# Patient Record
Sex: MALE | Race: BLACK OR AFRICAN AMERICAN | NOT HISPANIC OR LATINO | URBAN - METROPOLITAN AREA
[De-identification: names, ages, dates, MRNs, and addresses within clinical notes are randomized per-mention and may not be internally consistent; named-entity substitution may affect disease eponyms.]

---

## 2024-05-08 ENCOUNTER — HOSPITAL ENCOUNTER (INPATIENT)
Facility: HOSPITAL | Age: 64
LOS: 21 days | Discharge: NURSING, REHAB OR LONG-TERM CARE | DRG: 180 | End: 2024-05-29
Attending: STUDENT IN AN ORGANIZED HEALTH CARE EDUCATION/TRAINING PROGRAM | Admitting: HOSPITALIST
Payer: MEDICAID

## 2024-05-08 DIAGNOSIS — B20 AIDS (ACQUIRED IMMUNODEFICIENCY SYNDROME), CD4 <=200: ICD-10-CM

## 2024-05-08 DIAGNOSIS — R00.0 TACHYCARDIA: ICD-10-CM

## 2024-05-08 DIAGNOSIS — J90 PLEURAL EFFUSION, LEFT: ICD-10-CM

## 2024-05-08 DIAGNOSIS — J90 PLEURAL EFFUSION: ICD-10-CM

## 2024-05-08 DIAGNOSIS — C34.92 ADENOCARCINOMA OF LEFT LUNG: ICD-10-CM

## 2024-05-08 DIAGNOSIS — F14.90 CRACK COCAINE USE: ICD-10-CM

## 2024-05-08 DIAGNOSIS — R16.0 HYPODENSE MASS OF LIVER: ICD-10-CM

## 2024-05-08 DIAGNOSIS — J91.0 MALIGNANT PLEURAL EFFUSION: ICD-10-CM

## 2024-05-08 DIAGNOSIS — C34.90 ADENOCARCINOMA, LUNG: ICD-10-CM

## 2024-05-08 DIAGNOSIS — Z72.0 TOBACCO USE: ICD-10-CM

## 2024-05-08 DIAGNOSIS — R06.02 SHORTNESS OF BREATH: ICD-10-CM

## 2024-05-08 DIAGNOSIS — R09.02 HYPOXIA: Primary | ICD-10-CM

## 2024-05-08 DIAGNOSIS — C34.90 ADENOCARCINOMA OF LUNG, UNSPECIFIED LATERALITY: ICD-10-CM

## 2024-05-08 DIAGNOSIS — R06.02 SOB (SHORTNESS OF BREATH): ICD-10-CM

## 2024-05-08 DIAGNOSIS — R07.9 CHEST PAIN: ICD-10-CM

## 2024-05-08 PROBLEM — F99 PSYCHIATRIC DISORDER: Status: ACTIVE | Noted: 2020-06-03

## 2024-05-08 PROBLEM — M48.00 CENTRAL SPINAL STENOSIS: Status: ACTIVE | Noted: 2019-03-18

## 2024-05-08 PROBLEM — M47.812 CERVICAL SPONDYLOSIS: Status: ACTIVE | Noted: 2019-01-22

## 2024-05-08 PROBLEM — J96.01 ACUTE RESPIRATORY FAILURE WITH HYPOXIA: Status: ACTIVE | Noted: 2024-05-08

## 2024-05-08 LAB
ALBUMIN SERPL BCP-MCNC: 2.9 G/DL (ref 3.5–5.2)
ALP SERPL-CCNC: 98 U/L (ref 55–135)
ALT SERPL W/O P-5'-P-CCNC: 26 U/L (ref 10–44)
ANION GAP SERPL CALC-SCNC: 8 MMOL/L (ref 8–16)
AST SERPL-CCNC: 27 U/L (ref 10–40)
BASOPHILS # BLD AUTO: 0.06 K/UL (ref 0–0.2)
BASOPHILS NFR BLD: 0.6 % (ref 0–1.9)
BILIRUB SERPL-MCNC: 0.2 MG/DL (ref 0.1–1)
BNP SERPL-MCNC: <10 PG/ML (ref 0–99)
BUN SERPL-MCNC: 10 MG/DL (ref 8–23)
CALCIUM SERPL-MCNC: 8.7 MG/DL (ref 8.7–10.5)
CHLORIDE SERPL-SCNC: 109 MMOL/L (ref 95–110)
CO2 SERPL-SCNC: 23 MMOL/L (ref 23–29)
CREAT SERPL-MCNC: 0.8 MG/DL (ref 0.5–1.4)
DIFFERENTIAL METHOD BLD: ABNORMAL
EOSINOPHIL # BLD AUTO: 0.3 K/UL (ref 0–0.5)
EOSINOPHIL NFR BLD: 3.2 % (ref 0–8)
ERYTHROCYTE [DISTWIDTH] IN BLOOD BY AUTOMATED COUNT: 14.2 % (ref 11.5–14.5)
EST. GFR  (NO RACE VARIABLE): >60 ML/MIN/1.73 M^2
GLUCOSE SERPL-MCNC: 90 MG/DL (ref 70–110)
HCT VFR BLD AUTO: 50.2 % (ref 40–54)
HCV AB SERPL QL IA: NORMAL
HGB BLD-MCNC: 15.9 G/DL (ref 14–18)
HIV 1+2 AB+HIV1 P24 AG SERPL QL IA: REACTIVE
IMM GRANULOCYTES # BLD AUTO: 0.05 K/UL (ref 0–0.04)
IMM GRANULOCYTES NFR BLD AUTO: 0.5 % (ref 0–0.5)
LYMPHOCYTES # BLD AUTO: 1.9 K/UL (ref 1–4.8)
LYMPHOCYTES NFR BLD: 19.6 % (ref 18–48)
MCH RBC QN AUTO: 28.4 PG (ref 27–31)
MCHC RBC AUTO-ENTMCNC: 31.7 G/DL (ref 32–36)
MCV RBC AUTO: 90 FL (ref 82–98)
MONOCYTES # BLD AUTO: 1.1 K/UL (ref 0.3–1)
MONOCYTES NFR BLD: 11 % (ref 4–15)
NEUTROPHILS # BLD AUTO: 6.3 K/UL (ref 1.8–7.7)
NEUTROPHILS NFR BLD: 65.1 % (ref 38–73)
NRBC BLD-RTO: 0 /100 WBC
PLATELET # BLD AUTO: 203 K/UL (ref 150–450)
PMV BLD AUTO: 9.6 FL (ref 9.2–12.9)
POCT GLUCOSE: 111 MG/DL (ref 70–110)
POTASSIUM SERPL-SCNC: 4.3 MMOL/L (ref 3.5–5.1)
PROT SERPL-MCNC: 6.9 G/DL (ref 6–8.4)
RBC # BLD AUTO: 5.6 M/UL (ref 4.6–6.2)
SODIUM SERPL-SCNC: 140 MMOL/L (ref 136–145)
TROPONIN I SERPL DL<=0.01 NG/ML-MCNC: 0.01 NG/ML (ref 0–0.03)
TROPONIN I SERPL DL<=0.01 NG/ML-MCNC: <0.006 NG/ML (ref 0–0.03)
WBC # BLD AUTO: 9.66 K/UL (ref 3.9–12.7)

## 2024-05-08 PROCEDURE — 94761 N-INVAS EAR/PLS OXIMETRY MLT: CPT

## 2024-05-08 PROCEDURE — 27000221 HC OXYGEN, UP TO 24 HOURS

## 2024-05-08 PROCEDURE — 93005 ELECTROCARDIOGRAM TRACING: CPT

## 2024-05-08 PROCEDURE — 63600175 PHARM REV CODE 636 W HCPCS: Performed by: PHYSICIAN ASSISTANT

## 2024-05-08 PROCEDURE — 80053 COMPREHEN METABOLIC PANEL: CPT

## 2024-05-08 PROCEDURE — 87040 BLOOD CULTURE FOR BACTERIA: CPT | Mod: 59 | Performed by: PHYSICIAN ASSISTANT

## 2024-05-08 PROCEDURE — 96365 THER/PROPH/DIAG IV INF INIT: CPT

## 2024-05-08 PROCEDURE — 99285 EMERGENCY DEPT VISIT HI MDM: CPT | Mod: 25

## 2024-05-08 PROCEDURE — 87150 DNA/RNA AMPLIFIED PROBE: CPT | Performed by: PHYSICIAN ASSISTANT

## 2024-05-08 PROCEDURE — 94640 AIRWAY INHALATION TREATMENT: CPT | Mod: XB

## 2024-05-08 PROCEDURE — 21400001 HC TELEMETRY ROOM

## 2024-05-08 PROCEDURE — 87389 HIV-1 AG W/HIV-1&-2 AB AG IA: CPT | Mod: 91 | Performed by: PHYSICIAN ASSISTANT

## 2024-05-08 PROCEDURE — G0378 HOSPITAL OBSERVATION PER HR: HCPCS

## 2024-05-08 PROCEDURE — 93010 ELECTROCARDIOGRAM REPORT: CPT | Mod: 59,,, | Performed by: INTERNAL MEDICINE

## 2024-05-08 PROCEDURE — 25000242 PHARM REV CODE 250 ALT 637 W/ HCPCS

## 2024-05-08 PROCEDURE — 63700000 PHARM REV CODE 250 ALT 637 W/O HCPCS

## 2024-05-08 PROCEDURE — 36415 COLL VENOUS BLD VENIPUNCTURE: CPT | Performed by: PHYSICIAN ASSISTANT

## 2024-05-08 PROCEDURE — 87389 HIV-1 AG W/HIV-1&-2 AB AG IA: CPT | Performed by: PHYSICIAN ASSISTANT

## 2024-05-08 PROCEDURE — 83880 ASSAY OF NATRIURETIC PEPTIDE: CPT

## 2024-05-08 PROCEDURE — 84484 ASSAY OF TROPONIN QUANT: CPT | Mod: 91 | Performed by: PHYSICIAN ASSISTANT

## 2024-05-08 PROCEDURE — 85025 COMPLETE CBC W/AUTO DIFF WBC: CPT

## 2024-05-08 PROCEDURE — 25000003 PHARM REV CODE 250

## 2024-05-08 PROCEDURE — 86803 HEPATITIS C AB TEST: CPT | Performed by: PHYSICIAN ASSISTANT

## 2024-05-08 PROCEDURE — 63600175 PHARM REV CODE 636 W HCPCS

## 2024-05-08 PROCEDURE — 96375 TX/PRO/DX INJ NEW DRUG ADDON: CPT

## 2024-05-08 PROCEDURE — 84484 ASSAY OF TROPONIN QUANT: CPT

## 2024-05-08 RX ORDER — TALC
6 POWDER (GRAM) TOPICAL NIGHTLY PRN
Status: DISCONTINUED | OUTPATIENT
Start: 2024-05-08 | End: 2024-05-29 | Stop reason: HOSPADM

## 2024-05-08 RX ORDER — ALBUTEROL SULFATE 2.5 MG/.5ML
2.5 SOLUTION RESPIRATORY (INHALATION)
Status: COMPLETED | OUTPATIENT
Start: 2024-05-08 | End: 2024-05-08

## 2024-05-08 RX ORDER — AZITHROMYCIN 250 MG/1
250 TABLET, FILM COATED ORAL EVERY 24 HOURS
Status: DISCONTINUED | OUTPATIENT
Start: 2024-05-09 | End: 2024-05-09

## 2024-05-08 RX ORDER — IBUPROFEN 200 MG
16 TABLET ORAL
Status: DISCONTINUED | OUTPATIENT
Start: 2024-05-08 | End: 2024-05-29 | Stop reason: HOSPADM

## 2024-05-08 RX ORDER — AZITHROMYCIN 250 MG/1
500 TABLET, FILM COATED ORAL
Status: COMPLETED | OUTPATIENT
Start: 2024-05-08 | End: 2024-05-08

## 2024-05-08 RX ORDER — IPRATROPIUM BROMIDE AND ALBUTEROL SULFATE 2.5; .5 MG/3ML; MG/3ML
3 SOLUTION RESPIRATORY (INHALATION)
Status: COMPLETED | OUTPATIENT
Start: 2024-05-08 | End: 2024-05-08

## 2024-05-08 RX ORDER — GLUCAGON 1 MG
1 KIT INJECTION
Status: DISCONTINUED | OUTPATIENT
Start: 2024-05-08 | End: 2024-05-29 | Stop reason: HOSPADM

## 2024-05-08 RX ORDER — IBUPROFEN 200 MG
24 TABLET ORAL
Status: DISCONTINUED | OUTPATIENT
Start: 2024-05-08 | End: 2024-05-29 | Stop reason: HOSPADM

## 2024-05-08 RX ORDER — ONDANSETRON 8 MG/1
8 TABLET, ORALLY DISINTEGRATING ORAL EVERY 8 HOURS PRN
Status: DISCONTINUED | OUTPATIENT
Start: 2024-05-08 | End: 2024-05-29 | Stop reason: HOSPADM

## 2024-05-08 RX ORDER — BISACODYL 10 MG/1
10 SUPPOSITORY RECTAL DAILY PRN
Status: DISCONTINUED | OUTPATIENT
Start: 2024-05-08 | End: 2024-05-29 | Stop reason: HOSPADM

## 2024-05-08 RX ORDER — POLYETHYLENE GLYCOL 3350 17 G/17G
17 POWDER, FOR SOLUTION ORAL DAILY PRN
Status: DISCONTINUED | OUTPATIENT
Start: 2024-05-08 | End: 2024-05-29 | Stop reason: HOSPADM

## 2024-05-08 RX ORDER — IPRATROPIUM BROMIDE AND ALBUTEROL SULFATE 2.5; .5 MG/3ML; MG/3ML
3 SOLUTION RESPIRATORY (INHALATION) EVERY 4 HOURS PRN
Status: DISCONTINUED | OUTPATIENT
Start: 2024-05-08 | End: 2024-05-09

## 2024-05-08 RX ORDER — IPRATROPIUM BROMIDE AND ALBUTEROL SULFATE 2.5; .5 MG/3ML; MG/3ML
3 SOLUTION RESPIRATORY (INHALATION)
Status: DISCONTINUED | OUTPATIENT
Start: 2024-05-09 | End: 2024-05-09

## 2024-05-08 RX ORDER — ACETAMINOPHEN 325 MG/1
650 TABLET ORAL EVERY 4 HOURS PRN
Status: DISCONTINUED | OUTPATIENT
Start: 2024-05-08 | End: 2024-05-17

## 2024-05-08 RX ORDER — PROMETHAZINE HYDROCHLORIDE 25 MG/1
25 TABLET ORAL EVERY 6 HOURS PRN
Status: DISCONTINUED | OUTPATIENT
Start: 2024-05-08 | End: 2024-05-29 | Stop reason: HOSPADM

## 2024-05-08 RX ORDER — METHYLPREDNISOLONE SOD SUCC 125 MG
125 VIAL (EA) INJECTION
Status: COMPLETED | OUTPATIENT
Start: 2024-05-08 | End: 2024-05-08

## 2024-05-08 RX ORDER — FUROSEMIDE 10 MG/ML
20 INJECTION INTRAMUSCULAR; INTRAVENOUS ONCE
Status: COMPLETED | OUTPATIENT
Start: 2024-05-08 | End: 2024-05-08

## 2024-05-08 RX ORDER — PREDNISONE 20 MG/1
40 TABLET ORAL DAILY
Status: DISCONTINUED | OUTPATIENT
Start: 2024-05-09 | End: 2024-05-11

## 2024-05-08 RX ADMIN — ALBUTEROL SULFATE 2.5 MG: 2.5 SOLUTION RESPIRATORY (INHALATION) at 06:05

## 2024-05-08 RX ADMIN — AZITHROMYCIN DIHYDRATE 500 MG: 250 TABLET ORAL at 07:05

## 2024-05-08 RX ADMIN — FUROSEMIDE 20 MG: 10 INJECTION, SOLUTION INTRAMUSCULAR; INTRAVENOUS at 11:05

## 2024-05-08 RX ADMIN — IPRATROPIUM BROMIDE AND ALBUTEROL SULFATE 3 ML: .5; 3 SOLUTION RESPIRATORY (INHALATION) at 06:05

## 2024-05-08 RX ADMIN — METHYLPREDNISOLONE SODIUM SUCCINATE 125 MG: 125 INJECTION, POWDER, FOR SOLUTION INTRAMUSCULAR; INTRAVENOUS at 06:05

## 2024-05-08 RX ADMIN — CEFTRIAXONE 1 G: 1 INJECTION, POWDER, FOR SOLUTION INTRAMUSCULAR; INTRAVENOUS at 07:05

## 2024-05-08 NOTE — ED TRIAGE NOTES
"Oscar Waldron, a 64 y.o. male presents to the ED w/ complaint of SOB x 1 week accompanied by left rip soreness from coughing. Pt states "I have a machine I ordered off of [employer] that blows smoke that helps me breath that I used today". Pt denies any recreational drug use today, denies SI/HI. Pt denies CP, recent abx, n/v and chills.    Triage note:  Chief Complaint   Patient presents with    Shortness of Breath     Pt c/o SOB, left rib pain and cough x 1wk.  Onset after smoking crack.      Review of patient's allergies indicates:  Not on File  No past medical history on file.     "

## 2024-05-08 NOTE — ED PROVIDER NOTES
Encounter Date: 5/8/2024       History     Chief Complaint   Patient presents with    Shortness of Breath     Pt c/o SOB, left rib pain and cough x 1wk.  Onset after smoking crack.      64-year-old male with history of cocaine abuse is presenting to the emergency department for shortness of breath.  Patient reports history of tobacco use but no formal diagnosis of asthma or COPD.  He does admit to smoking crack 2 days prior.  He reports 1 week of worsening productive cough and shortness of breath with intermittent chest pain.  He reports a strain in his left anterior chest/upper abdomen with this frequent coughing.  States that he used family members breathing treatments and did feel improved from this.  No fevers at home.  No lower extremity swelling.  Patient denies any history of heart attack in the past.  He denies hemoptysis, fainting, lightheadedness.    The history is provided by the patient. No  was used.     Review of patient's allergies indicates:  No Known Allergies  No past medical history on file.  No past surgical history on file.  No family history on file.         Physical Exam     Initial Vitals [05/08/24 1621]   BP Pulse Resp Temp SpO2   (!) 152/98 89 (!) 22 98.4 °F (36.9 °C) 95 %      MAP       --         Physical Exam    Nursing note and vitals reviewed.  Constitutional: He is not diaphoretic. No distress.   Elderly male, frequent cough   HENT:   Head: Normocephalic and atraumatic.   Neck: Neck supple.   Normal range of motion.  Cardiovascular:  Normal rate and regular rhythm.           Pulmonary/Chest: No respiratory distress. He has wheezes. He has rhonchi. He has rales. He exhibits tenderness (Left anterior chest wall tenderness.  No crepitus.).   Expiratory wheeze.  Frequent cough on exam.  Satting 96% on 3 L nasal cannula. Decreased breath sounds L base    Abdominal: Abdomen is soft. He exhibits no distension. There is no abdominal tenderness. There is no rebound and no  guarding.   Musculoskeletal:         General: No edema.      Cervical back: Normal range of motion and neck supple.     Neurological: He is alert.   Skin: Skin is warm and dry.   Psychiatric: He has a normal mood and affect. Thought content normal.         ED Course   Procedures  Labs Reviewed   CBC W/ AUTO DIFFERENTIAL - Abnormal; Notable for the following components:       Result Value    MCHC 31.7 (*)     Immature Grans (Abs) 0.05 (*)     Mono # 1.1 (*)     All other components within normal limits   COMPREHENSIVE METABOLIC PANEL - Abnormal; Notable for the following components:    Albumin 2.9 (*)     All other components within normal limits   POCT GLUCOSE - Abnormal; Notable for the following components:    POCT Glucose 111 (*)     All other components within normal limits   HEPATITIS C ANTIBODY    Narrative:     Release to patient->Immediate   TROPONIN I   B-TYPE NATRIURETIC PEPTIDE     EKG Readings: (Independently Interpreted)   Initial Reading: No STEMI. Previous EKG: Compared with most recent EKG Rhythm: Normal Sinus Rhythm. Heart Rate: 87. ST Segments: Normal ST Segments. T Waves: Normal.     ECG Results              EKG 12-lead (Final result)        Collection Time Result Time QRS Duration OHS QTC Calculation    05/08/24 20:48:44 05/09/24 10:45:15 82 434                     Final result by Interface, [facility] (05/09/24 10:45:25)                   Narrative:    Test Reason : R06.02,    Vent. Rate : 095 BPM     Atrial Rate : 095 BPM     P-R Int : 140 ms          QRS Dur : 082 ms      QT Int : 346 ms       P-R-T Axes : 064 045 013 degrees     QTc Int : 434 ms    Normal sinus rhythm  Low septal forces ; Abnormal R wave progression in the precordial leads   -Possible  Septal infarct (cited on or before 08-MAY-2024)  Abnormal ECG  When compared with ECG of 08-MAY-2024 17:29,  Nonspecific T wave abnormality now evident in Inferior leads  Nonspecific T wave abnormality now evident in Lateral  leads  Confirmed by Bay ANGELO MD ([id number]) on 5/9/2024 10:45:12 AM    Referred By: [email or web address]   SELF           Confirmed By:Bay ANGELO MD                                  Imaging Results               CT Chest Without Contrast (Final result)  Result time 05/09/24 09:21:10      Final result by Makenna Stokes MD (05/09/24 09:21:10)                   Impression:      1. Solid spiculated nodule in the left lung apex and irregular consolidation area in the lingula, worrisome for lung cancer.  2. Ground-glass nodule in the right middle lobe, may represent infectious/inflammatory process versus primary lung cancer.  Consider attention on follow-up studies.  3. Bilateral irregular pulmonary nodules, may be sequela of infectious/inflammatory process versus neoplastic nodules.  Consider attention on follow-up.  4. Ill-defined hypoattenuation of the inferior hepatic lobe and few lucent lesions in the vertebral body of L1, nonspecific but worrisome for metastatic process.  5. Left adrenal nodule.  6. Consider further evaluation by CT scan of the abdomen and pelvis with contrast.  7. Additional findings as above.  This report was flagged in Epic as abnormal.    Electronically signed by resident: Jaime Garsia  Date:    05/09/2024  Time:    08:18    Electronically signed by: Makenna Spangler  Date:    05/09/2024  Time:    09:21               Narrative:    EXAMINATION:  CT CHEST WITHOUT CONTRAST    CLINICAL HISTORY:  Pleural effusion, malignancy suspected;    TECHNIQUE:  Low dose axial images, sagittal and coronal reformations were obtained from the thoracic inlet to the lung bases. Contrast was not administered.    COMPARISON:  Chest x-ray 05/08/2024    FINDINGS:  Chest wall and lower neck: No axillary or supraclavicular lymphadenopathy.Visualized thyroid gland is unremarkable.    Heart and mediastinum: 1.0 cm precarinal node (series 2, image 57).  Few additional subcentimeter mediastinal nodes.  Evaluation of hilar  lymphadenopathy is limited due to lack of IV contrast.  Heart is normal size.No pericardial effusion.  Main pulmonary artery is normal size.    Aorta: Left-sided aortic arch.Non-aneurysmal. Mildatheromatous disease in the aorta.    Lungs and pleura: Airways are patent. Mild centrilobular and paraseptal emphysema with upper lobe predominance.  Moderate to large left pleural effusion with extension along the major fissure and adjacent compressive atelectasis. Spiculated solid nodule in the left lung apex measuring 1.4 x 1.8 cm (series 4, image 140).  Additional irregularly marginated solid nodule extending to the pleural surface in the right upper lobe (series 4, image 116).  Several smaller solid nodules bilaterally.  In the right middle lobe, there is a 2.1 x 1.7 cm ground-glass nodule with a solid central component measuring 0.9 x 0.5 cm (series 4, image 233).  Several additional foci of adjacent ground-glass in the right middle and lower lobes (for example series 4, image 261, 285).  Linear opacities in the right lung base, likely subsegmental atelectasis versus scarring.  No pneumothorax.    Esophagus and upper abdomen: Ill-defined hypoattenuation in the inferior right hepatic lobe (series 2, image 142), measuring 2.2 x 1.5 cm.  Left adrenal nodule measuring 1.1 x 1 cm (series 2, image 125).    Bones: Age-appropriate degenerative change. No acute fracture.  Few lucent lesions in the vertebral body of L1.                                       X-Ray Chest AP Portable (Final result)  Result time 05/08/24 19:38:19      Final result by Harry Spivey MD (05/08/24 19:38:19)                   Impression:      Cardiomegaly with pulmonary vascular congestion and bilateral interstitial opacities, suggestive of pulmonary edema secondary to CHF.    Moderate left-sided pleural effusion with associated compressive atelectasis.      Electronically signed by: Harry Spivey MD  Date:    05/08/2024  Time:    19:38                Narrative:    EXAMINATION:  XR CHEST AP PORTABLE    CLINICAL HISTORY:  CHF;    TECHNIQUE:  Single frontal view of the chest was performed.    COMPARISON:  None    FINDINGS:  Monitoring EKG leads are present.  The trachea is unremarkable.  There is obscuration of the left aspect of the cardiomediastinal silhouette.  There is no evidence of free air beneath the hemidiaphragms.  There is a moderate left-sided pleural effusion with associated compressive atelectasis.  There is no evidence of a pneumothorax.  There is no evidence of pneumomediastinum.  There is pulmonary vascular congestion.  There are bilateral interstitial opacities.  There are degenerative changes in the osseous structures.                                       Medications   predniSONE tablet 40 mg (40 mg Oral Given 5/9/24 0840)   melatonin tablet 6 mg (has no administration in time range)   ondansetron disintegrating tablet 8 mg (has no administration in time range)   promethazine tablet 25 mg (has no administration in time range)   polyethylene glycol packet 17 g (has no administration in time range)   bisacodyL suppository 10 mg (has no administration in time range)   acetaminophen tablet 650 mg (has no administration in time range)   glucose chewable tablet 16 g (has no administration in time range)   glucose chewable tablet 24 g (has no administration in time range)   glucagon (human recombinant) injection 1 mg (has no administration in time range)   dextrose 10% bolus 125 mL 125 mL (has no administration in time range)   dextrose 10% bolus 250 mL 250 mL (has no administration in time range)   potassium, sodium phosphates 280-160-250 mg packet 2 packet (2 packets Oral Given 5/9/24 5639)   benzonatate capsule 100 mg (100 mg Oral Given 5/9/24 1459)   guaiFENesin 12 hr tablet 1,200 mg (1,200 mg Oral Given 5/9/24 1112)   cefTRIAXone (ROCEPHIN) 2 g in dextrose 5 % in water (D5W) 100 mL IVPB (MB+) (has no administration in time range)     And    azithromycin tablet 250 mg (has no administration in time range)   albuterol-ipratropium 2.5 mg-0.5 mg/3 mL nebulizer solution 3 mL (3 mLs Nebulization Given 5/9/24 1536)   albuterol sulfate nebulizer solution 2.5 mg (2.5 mg Nebulization Given 5/8/24 1840)   albuterol-ipratropium 2.5 mg-0.5 mg/3 mL nebulizer solution 3 mL (3 mLs Nebulization Given 5/8/24 1840)   methylPREDNISolone sodium succinate injection 125 mg (125 mg Intravenous Given 5/8/24 1839)   cefTRIAXone (Rocephin) 1 g in dextrose 5 % in water (D5W) 100 mL IVPB (MB+) (0 g Intravenous Stopped 5/8/24 1940)   azithromycin tablet 500 mg (500 mg Oral Given 5/8/24 1910)   furosemide injection 20 mg (20 mg Intravenous Given 5/8/24 2300)   iohexol (Omnipaque 350) oral solution 15 mL (15 mLs Oral Given 5/9/24 1557)     Medical Decision Making  64-year-old male with history of cocaine abuse is presenting to the emergency department for shortness of breath.  Patient reports history of tobacco use but no formal diagnosis of asthma or COPD.  He does admit to smoking crack 2 days prior.  He reports 1 week of worsening productive cough and shortness of breath with intermittent chest pain.  Wheeze, crackles on exam.  Reproducible tenderness of the left chest wall/upper abdomen consistent with costochondritis likely secondary to recent bronchitis versus pneumonia.  Suspect also exacerbation from crack cocaine smoking 2 days ago.  Will treat with breathing treatments, steroids and cover for community-acquired pneumonia.  He is hemodynamically stable.  Requiring 3 L nasal cannula.  He has not tachycardic.  EKG without ischemic changes.  Will check troponin and BNP, however feel less likely CHF exacerbation or NSTEMI at this time.    Amount and/or Complexity of Data Reviewed  Labs: ordered. Decision-making details documented in ED Course.  Radiology: ordered and independent interpretation performed. Decision-making details documented in ED Course.  ECG/medicine tests:   Decision-making details documented in ED Course.    Risk  Prescription drug management.              Attending Attestation:   Physician Attestation Statement for Resident:  As the supervising MD   Physician Attestation Statement: I have personally seen and examined this patient.   I agree with the above history.  -:   As the supervising MD I agree with the above PE.     As the supervising MD I agree with the above treatment, course, plan, and disposition.   -: Procedure: Critical Care  Please put in 30 minutes of critical care due to patient having a high risk of cardiorespiratory failure.     See ED course for additional attending MDM                         ED Course as of 05/09/24 1754   Wed May 08, 2024   1750 EKG 12-lead  EKG independently interpreted by me shows  normal sinus rhythm, rate 87, no STEMI, no prior for comparison [BD]   1841 Patient was counseled regarding cocaine and smoking cessation. [KL]   1841 WBC: 9.66  No leukocytosis. [KL]   1847 EKG 12-lead  EKG independently interpreted by me shows normal sinus rhythm, rate 87, no STEMI, no prior for comparison [BD]   1854 BNP: <10  BNP within normal limits.  Patient does not have any evidence of overload on exam. [KL]   1959 X-Ray Chest AP Portable  Reviewed and independently interpreted shows large left-sided pleural effusion [BD]   2031 X-Ray Chest AP Portable  Large pleural effusion on chest x-ray.  Patient remains on 3 L nasal cannula.  Feeling some improvement after medications provided in the emergency department.  Plan to bring in for admission for continued monitoring and treatment.  Patient is stable at the time of admission. [KL]   2035 Patient had family 17 beats of ventricular tachycardia that is spontaneously resolved.  Will bring into hospital for continued telemetry monitoring. [KL]      ED Course User Index  [BD] Eldon Gilmore MD  [KL] Camryn Pruitt MD                           Clinical Impression:  Final diagnoses:  [R06.02] SOB  (shortness of breath)  [R06.02] Shortness of breath  [R09.02] Hypoxia (Primary)  [J90] Pleural effusion  [F14.90] Crack cocaine use  [Z72.0] Tobacco use  [R00.0] Tachycardia          ED Disposition Condition    Observation                 Camryn Pruitt MD  Resident  05/09/24 1326       Eldon Gilmore MD  05/09/24 0244

## 2024-05-09 PROBLEM — E83.39 HYPOPHOSPHATEMIA: Status: ACTIVE | Noted: 2024-05-09

## 2024-05-09 LAB
ACID FAST MOD KINY STN SPEC: NORMAL
ANION GAP SERPL CALC-SCNC: 10 MMOL/L (ref 8–16)
APPEARANCE FLD: NORMAL
ASCENDING AORTA: 2.77 CM
AV INDEX (PROSTH): 1.26
AV MEAN GRADIENT: 3 MMHG
AV PEAK GRADIENT: 4 MMHG
AV VALVE AREA BY VELOCITY RATIO: 3.23 CM²
AV VALVE AREA: 3.93 CM²
AV VELOCITY RATIO: 1.04
BASOPHILS # BLD AUTO: 0.01 K/UL (ref 0–0.2)
BASOPHILS NFR BLD: 0.1 % (ref 0–1.9)
BODY FLD TYPE: NORMAL
BODY FLUID SOURCE, LDH: NORMAL
BSA FOR ECHO PROCEDURE: 1.78 M2
BUN SERPL-MCNC: 14 MG/DL (ref 8–23)
CALCIUM SERPL-MCNC: 9.3 MG/DL (ref 8.7–10.5)
CD3+CD4+ CELLS # BLD: 115 CELLS/UL (ref 300–1400)
CD3+CD4+ CELLS NFR BLD: 11 % (ref 28–57)
CHLORIDE SERPL-SCNC: 110 MMOL/L (ref 95–110)
CHOLEST SERPL-MCNC: 147 MG/DL (ref 120–199)
CHOLEST/HDLC SERPL: 3.8 {RATIO} (ref 2–5)
CO2 SERPL-SCNC: 21 MMOL/L (ref 23–29)
COLOR FLD: YELLOW
CREAT SERPL-MCNC: 0.9 MG/DL (ref 0.5–1.4)
CV ECHO LV RWT: 0.39 CM
DIFFERENTIAL METHOD BLD: ABNORMAL
DOP CALC AO PEAK VEL: 1.01 M/S
DOP CALC AO VTI: 18.52 CM
DOP CALC LVOT AREA: 3.1 CM2
DOP CALC LVOT DIAMETER: 1.99 CM
DOP CALC LVOT PEAK VEL: 1.05 M/S
DOP CALC LVOT STROKE VOLUME: 72.71 CM3
DOP CALCLVOT PEAK VEL VTI: 23.39 CM
E WAVE DECELERATION TIME: 203.89 MSEC
E/A RATIO: 0.81
E/E' RATIO: 8.14 M/S
ECHO LV POSTERIOR WALL: 0.8 CM (ref 0.6–1.1)
EOSINOPHIL # BLD AUTO: 0 K/UL (ref 0–0.5)
EOSINOPHIL NFR BLD: 0 % (ref 0–8)
EOSINOPHIL NFR FLD MANUAL: 1 %
ERYTHROCYTE [DISTWIDTH] IN BLOOD BY AUTOMATED COUNT: 14.2 % (ref 11.5–14.5)
EST. GFR  (NO RACE VARIABLE): >60 ML/MIN/1.73 M^2
FRACTIONAL SHORTENING: 27 % (ref 28–44)
GLUCOSE FLD-MCNC: 115 MG/DL
GLUCOSE SERPL-MCNC: 138 MG/DL (ref 70–110)
GRAM STN SPEC: NORMAL
GRAM STN SPEC: NORMAL
HCT VFR BLD AUTO: 49.5 % (ref 40–54)
HDLC SERPL-MCNC: 39 MG/DL (ref 40–75)
HDLC SERPL: 26.5 % (ref 20–50)
HGB BLD-MCNC: 15.9 G/DL (ref 14–18)
HIV SUPPLEMENTAL ASSAY INTERPRETATION: ABNORMAL
HIV-1 RESULT: POSITIVE
HIV-2 RESULT: ABNORMAL
IMM GRANULOCYTES # BLD AUTO: 0.06 K/UL (ref 0–0.04)
IMM GRANULOCYTES NFR BLD AUTO: 0.5 % (ref 0–0.5)
INTERVENTRICULAR SEPTUM: 0.71 CM (ref 0.6–1.1)
KOH PREP SPEC: NORMAL
LA MAJOR: 4.16 CM
LA MINOR: 4.19 CM
LA WIDTH: 2.88 CM
LDH FLD L TO P-CCNC: 435 U/L
LDH SERPL L TO P-CCNC: 281 U/L (ref 110–260)
LDLC SERPL CALC-MCNC: 95.8 MG/DL (ref 63–159)
LEFT ATRIUM SIZE: 2.44 CM
LEFT ATRIUM VOLUME INDEX MOD: 15.1 ML/M2
LEFT ATRIUM VOLUME INDEX: 14.2 ML/M2
LEFT ATRIUM VOLUME MOD: 26.52 CM3
LEFT ATRIUM VOLUME: 24.94 CM3
LEFT INTERNAL DIMENSION IN SYSTOLE: 2.97 CM (ref 2.1–4)
LEFT VENTRICLE DIASTOLIC VOLUME INDEX: 41.77 ML/M2
LEFT VENTRICLE DIASTOLIC VOLUME: 73.52 ML
LEFT VENTRICLE MASS INDEX: 51 G/M2
LEFT VENTRICLE SYSTOLIC VOLUME INDEX: 19.4 ML/M2
LEFT VENTRICLE SYSTOLIC VOLUME: 34.14 ML
LEFT VENTRICULAR INTERNAL DIMENSION IN DIASTOLE: 4.08 CM (ref 3.5–6)
LEFT VENTRICULAR MASS: 89.42 G
LV LATERAL E/E' RATIO: 9.5 M/S
LV SEPTAL E/E' RATIO: 7.13 M/S
LYMPHOCYTES # BLD AUTO: 1 K/UL (ref 1–4.8)
LYMPHOCYTES NFR BLD: 8.1 % (ref 18–48)
LYMPHOCYTES NFR FLD MANUAL: 35 %
MAGNESIUM SERPL-MCNC: 2 MG/DL (ref 1.6–2.6)
MCH RBC QN AUTO: 28.4 PG (ref 27–31)
MCHC RBC AUTO-ENTMCNC: 32.1 G/DL (ref 32–36)
MCV RBC AUTO: 89 FL (ref 82–98)
MESOTHL CELL NFR FLD MANUAL: 19 %
MONOCYTES # BLD AUTO: 0.2 K/UL (ref 0.3–1)
MONOCYTES NFR BLD: 1.2 % (ref 4–15)
MONOS+MACROS NFR FLD MANUAL: 13 %
MRSA ID BY PCR: NEGATIVE
MV PEAK A VEL: 0.7 M/S
MV PEAK E VEL: 0.57 M/S
NEUTROPHILS # BLD AUTO: 10.9 K/UL (ref 1.8–7.7)
NEUTROPHILS NFR BLD: 90.1 % (ref 38–73)
NEUTROPHILS NFR FLD MANUAL: 32 %
NONHDLC SERPL-MCNC: 108 MG/DL
NRBC BLD-RTO: 0 /100 WBC
OHS CV RV/LV RATIO: 0.93 CM
OHS QRS DURATION: 82 MS
OHS QRS DURATION: 86 MS
OHS QTC CALCULATION: 434 MS
OHS QTC CALCULATION: 457 MS
PHOSPHATE SERPL-MCNC: 1.5 MG/DL (ref 2.7–4.5)
PLATELET # BLD AUTO: 206 K/UL (ref 150–450)
PMV BLD AUTO: 9.1 FL (ref 9.2–12.9)
POCT GLUCOSE: 102 MG/DL (ref 70–110)
POCT GLUCOSE: 142 MG/DL (ref 70–110)
POCT GLUCOSE: 172 MG/DL (ref 70–110)
POTASSIUM SERPL-SCNC: 4.4 MMOL/L (ref 3.5–5.1)
PROCALCITONIN SERPL IA-MCNC: 0.02 NG/ML
PROT FLD-MCNC: 4 G/DL
RA MAJOR: 3.87 CM
RA PRESSURE ESTIMATED: 8 MMHG
RA WIDTH: 3.28 CM
RBC # BLD AUTO: 5.59 M/UL (ref 4.6–6.2)
RIGHT VENTRICULAR END-DIASTOLIC DIMENSION: 3.8 CM
SINUS: 2.88 CM
SODIUM SERPL-SCNC: 141 MMOL/L (ref 136–145)
SPECIMEN SOURCE: NORMAL
SPECIMEN SOURCE: NORMAL
STAPH AUREUS ID BY PCR: NEGATIVE
STJ: 2.71 CM
TDI LATERAL: 0.06 M/S
TDI SEPTAL: 0.08 M/S
TDI: 0.07 M/S
TRICUSPID ANNULAR PLANE SYSTOLIC EXCURSION: 1.84 CM
TRIGL SERPL-MCNC: 61 MG/DL (ref 30–150)
TROPONIN I SERPL DL<=0.01 NG/ML-MCNC: 0.01 NG/ML (ref 0–0.03)
WBC # BLD AUTO: 12.1 K/UL (ref 3.9–12.7)
WBC # FLD: 874 /CU MM
Z-SCORE OF LEFT VENTRICULAR DIMENSION IN END DIASTOLE: -1.76
Z-SCORE OF LEFT VENTRICULAR DIMENSION IN END SYSTOLE: -0.11

## 2024-05-09 PROCEDURE — 27000207 HC ISOLATION

## 2024-05-09 PROCEDURE — 87102 FUNGUS ISOLATION CULTURE: CPT

## 2024-05-09 PROCEDURE — 87556 M.TUBERCULO DNA AMP PROBE: CPT | Performed by: STUDENT IN AN ORGANIZED HEALTH CARE EDUCATION/TRAINING PROGRAM

## 2024-05-09 PROCEDURE — 25000003 PHARM REV CODE 250

## 2024-05-09 PROCEDURE — 83615 LACTATE (LD) (LDH) ENZYME: CPT | Performed by: PHYSICIAN ASSISTANT

## 2024-05-09 PROCEDURE — 32554 ASPIRATE PLEURA W/O IMAGING: CPT | Mod: LT,,, | Performed by: INTERNAL MEDICINE

## 2024-05-09 PROCEDURE — 0W9B3ZZ DRAINAGE OF LEFT PLEURAL CAVITY, PERCUTANEOUS APPROACH: ICD-10-PCS | Performed by: INTERNAL MEDICINE

## 2024-05-09 PROCEDURE — 85025 COMPLETE CBC W/AUTO DIFF WBC: CPT | Performed by: PHYSICIAN ASSISTANT

## 2024-05-09 PROCEDURE — 83615 LACTATE (LD) (LDH) ENZYME: CPT | Mod: 91

## 2024-05-09 PROCEDURE — 99223 1ST HOSP IP/OBS HIGH 75: CPT | Mod: 25,,, | Performed by: INTERNAL MEDICINE

## 2024-05-09 PROCEDURE — 87070 CULTURE OTHR SPECIMN AEROBIC: CPT | Mod: 59 | Performed by: PHYSICIAN ASSISTANT

## 2024-05-09 PROCEDURE — 25000242 PHARM REV CODE 250 ALT 637 W/ HCPCS

## 2024-05-09 PROCEDURE — 84145 PROCALCITONIN (PCT): CPT | Performed by: PHYSICIAN ASSISTANT

## 2024-05-09 PROCEDURE — 83735 ASSAY OF MAGNESIUM: CPT | Performed by: PHYSICIAN ASSISTANT

## 2024-05-09 PROCEDURE — 80048 BASIC METABOLIC PNL TOTAL CA: CPT | Performed by: PHYSICIAN ASSISTANT

## 2024-05-09 PROCEDURE — 87070 CULTURE OTHR SPECIMN AEROBIC: CPT

## 2024-05-09 PROCEDURE — 87206 SMEAR FLUORESCENT/ACID STAI: CPT | Mod: 91

## 2024-05-09 PROCEDURE — 36415 COLL VENOUS BLD VENIPUNCTURE: CPT | Performed by: STUDENT IN AN ORGANIZED HEALTH CARE EDUCATION/TRAINING PROGRAM

## 2024-05-09 PROCEDURE — 87206 SMEAR FLUORESCENT/ACID STAI: CPT | Mod: 91 | Performed by: STUDENT IN AN ORGANIZED HEALTH CARE EDUCATION/TRAINING PROGRAM

## 2024-05-09 PROCEDURE — 25000242 PHARM REV CODE 250 ALT 637 W/ HCPCS: Performed by: PHYSICIAN ASSISTANT

## 2024-05-09 PROCEDURE — 87015 SPECIMEN INFECT AGNT CONCNTJ: CPT | Performed by: STUDENT IN AN ORGANIZED HEALTH CARE EDUCATION/TRAINING PROGRAM

## 2024-05-09 PROCEDURE — 87205 SMEAR GRAM STAIN: CPT | Mod: 59 | Performed by: PHYSICIAN ASSISTANT

## 2024-05-09 PROCEDURE — 89051 BODY FLUID CELL COUNT: CPT

## 2024-05-09 PROCEDURE — 87205 SMEAR GRAM STAIN: CPT

## 2024-05-09 PROCEDURE — 84484 ASSAY OF TROPONIN QUANT: CPT | Performed by: PHYSICIAN ASSISTANT

## 2024-05-09 PROCEDURE — 84311 SPECTROPHOTOMETRY: CPT

## 2024-05-09 PROCEDURE — 87536 HIV-1 QUANT&REVRSE TRNSCRPJ: CPT | Performed by: STUDENT IN AN ORGANIZED HEALTH CARE EDUCATION/TRAINING PROGRAM

## 2024-05-09 PROCEDURE — 21400001 HC TELEMETRY ROOM

## 2024-05-09 PROCEDURE — 87305 ASPERGILLUS AG IA: CPT | Performed by: STUDENT IN AN ORGANIZED HEALTH CARE EDUCATION/TRAINING PROGRAM

## 2024-05-09 PROCEDURE — 63600175 PHARM REV CODE 636 W HCPCS: Performed by: PHYSICIAN ASSISTANT

## 2024-05-09 PROCEDURE — 87116 MYCOBACTERIA CULTURE: CPT

## 2024-05-09 PROCEDURE — 27000221 HC OXYGEN, UP TO 24 HOURS

## 2024-05-09 PROCEDURE — 25000003 PHARM REV CODE 250: Performed by: PHYSICIAN ASSISTANT

## 2024-05-09 PROCEDURE — 86361 T CELL ABSOLUTE COUNT: CPT | Performed by: PHYSICIAN ASSISTANT

## 2024-05-09 PROCEDURE — 25500020 PHARM REV CODE 255

## 2024-05-09 PROCEDURE — 87040 BLOOD CULTURE FOR BACTERIA: CPT | Performed by: HOSPITALIST

## 2024-05-09 PROCEDURE — 82945 GLUCOSE OTHER FLUID: CPT

## 2024-05-09 PROCEDURE — 36415 COLL VENOUS BLD VENIPUNCTURE: CPT | Performed by: PHYSICIAN ASSISTANT

## 2024-05-09 PROCEDURE — 80061 LIPID PANEL: CPT | Performed by: PHYSICIAN ASSISTANT

## 2024-05-09 PROCEDURE — 84157 ASSAY OF PROTEIN OTHER: CPT

## 2024-05-09 PROCEDURE — 87210 SMEAR WET MOUNT SALINE/INK: CPT

## 2024-05-09 PROCEDURE — 87116 MYCOBACTERIA CULTURE: CPT | Mod: 59 | Performed by: STUDENT IN AN ORGANIZED HEALTH CARE EDUCATION/TRAINING PROGRAM

## 2024-05-09 PROCEDURE — 87798 DETECT AGENT NOS DNA AMP: CPT | Performed by: STUDENT IN AN ORGANIZED HEALTH CARE EDUCATION/TRAINING PROGRAM

## 2024-05-09 PROCEDURE — 84100 ASSAY OF PHOSPHORUS: CPT | Performed by: PHYSICIAN ASSISTANT

## 2024-05-09 PROCEDURE — 87206 SMEAR FLUORESCENT/ACID STAI: CPT

## 2024-05-09 PROCEDURE — 94640 AIRWAY INHALATION TREATMENT: CPT

## 2024-05-09 PROCEDURE — 63700000 PHARM REV CODE 250 ALT 637 W/O HCPCS: Performed by: PHYSICIAN ASSISTANT

## 2024-05-09 PROCEDURE — 94761 N-INVAS EAR/PLS OXIMETRY MLT: CPT

## 2024-05-09 PROCEDURE — 99900035 HC TECH TIME PER 15 MIN (STAT)

## 2024-05-09 PROCEDURE — 99223 1ST HOSP IP/OBS HIGH 75: CPT | Mod: ,,, | Performed by: INTERNAL MEDICINE

## 2024-05-09 RX ORDER — AZITHROMYCIN 250 MG/1
250 TABLET, FILM COATED ORAL EVERY 24 HOURS
Status: COMPLETED | OUTPATIENT
Start: 2024-05-10 | End: 2024-05-12

## 2024-05-09 RX ORDER — BENZONATATE 100 MG/1
100 CAPSULE ORAL 3 TIMES DAILY
Status: DISCONTINUED | OUTPATIENT
Start: 2024-05-09 | End: 2024-05-29 | Stop reason: HOSPADM

## 2024-05-09 RX ORDER — IPRATROPIUM BROMIDE AND ALBUTEROL SULFATE 2.5; .5 MG/3ML; MG/3ML
3 SOLUTION RESPIRATORY (INHALATION)
Status: DISCONTINUED | OUTPATIENT
Start: 2024-05-09 | End: 2024-05-12

## 2024-05-09 RX ORDER — SODIUM,POTASSIUM PHOSPHATES 280-250MG
2 POWDER IN PACKET (EA) ORAL EVERY 4 HOURS
Status: COMPLETED | OUTPATIENT
Start: 2024-05-09 | End: 2024-05-09

## 2024-05-09 RX ORDER — GUAIFENESIN 600 MG/1
1200 TABLET, EXTENDED RELEASE ORAL 2 TIMES DAILY
Status: DISCONTINUED | OUTPATIENT
Start: 2024-05-09 | End: 2024-05-29 | Stop reason: HOSPADM

## 2024-05-09 RX ADMIN — IPRATROPIUM BROMIDE AND ALBUTEROL SULFATE 3 ML: 2.5; .5 SOLUTION RESPIRATORY (INHALATION) at 03:05

## 2024-05-09 RX ADMIN — GUAIFENESIN 1200 MG: 600 TABLET, EXTENDED RELEASE ORAL at 11:05

## 2024-05-09 RX ADMIN — IPRATROPIUM BROMIDE AND ALBUTEROL SULFATE 3 ML: 2.5; .5 SOLUTION RESPIRATORY (INHALATION) at 08:05

## 2024-05-09 RX ADMIN — BENZONATATE 100 MG: 100 CAPSULE ORAL at 02:05

## 2024-05-09 RX ADMIN — AZITHROMYCIN DIHYDRATE 250 MG: 250 TABLET ORAL at 08:05

## 2024-05-09 RX ADMIN — IPRATROPIUM BROMIDE AND ALBUTEROL SULFATE 3 ML: 2.5; .5 SOLUTION RESPIRATORY (INHALATION) at 12:05

## 2024-05-09 RX ADMIN — PREDNISONE 40 MG: 20 TABLET ORAL at 08:05

## 2024-05-09 RX ADMIN — BENZONATATE 100 MG: 100 CAPSULE ORAL at 09:05

## 2024-05-09 RX ADMIN — IOHEXOL 15 ML: 350 INJECTION, SOLUTION INTRAVENOUS at 02:05

## 2024-05-09 RX ADMIN — POTASSIUM & SODIUM PHOSPHATES POWDER PACK 280-160-250 MG 2 PACKET: 280-160-250 PACK at 09:05

## 2024-05-09 RX ADMIN — IOHEXOL 15 ML: 350 INJECTION, SOLUTION INTRAVENOUS at 03:05

## 2024-05-09 RX ADMIN — POTASSIUM & SODIUM PHOSPHATES POWDER PACK 280-160-250 MG 2 PACKET: 280-160-250 PACK at 02:05

## 2024-05-09 RX ADMIN — CEFTRIAXONE 1 G: 1 INJECTION, POWDER, FOR SOLUTION INTRAMUSCULAR; INTRAVENOUS at 08:05

## 2024-05-09 RX ADMIN — POTASSIUM & SODIUM PHOSPHATES POWDER PACK 280-160-250 MG 2 PACKET: 280-160-250 PACK at 05:05

## 2024-05-09 RX ADMIN — GUAIFENESIN 1200 MG: 600 TABLET, EXTENDED RELEASE ORAL at 09:05

## 2024-05-09 NOTE — ASSESSMENT & PLAN NOTE
Patient with Hypoxic Respiratory failure which is Acute.  he is not on home oxygen. Supplemental oxygen was provided and noted-      .   Signs/symptoms of respiratory failure include- tachypnea, increased work of breathing, and respiratory distress. Contributing diagnoses includes - Pleural effusion and Pneumonia Labs and images were reviewed. Patient Has recent ABG, which has been reviewed. Will treat underlying causes and adjust management of respiratory failure as follows- See pleural effusion

## 2024-05-09 NOTE — SUBJECTIVE & OBJECTIVE
No past medical history on file.    No past surgical history on file.    Review of patient's allergies indicates:  No Known Allergies    Family History    None       Tobacco Use    Smoking status: Not on file    Smokeless tobacco: Not on file   Substance and Sexual Activity    Alcohol use: Not on file    Drug use: Not on file    Sexual activity: Not on file           Objective:     Vital Signs (Most Recent):  Temp: 98.6 °F (37 °C) (05/09/24 0732)  Pulse: 93 (05/09/24 0854)  Resp: 19 (05/09/24 0854)  BP: (!) 142/87 (05/09/24 0732)  SpO2: 96 % (05/09/24 0854) Vital Signs (24h Range):  Temp:  [98.3 °F (36.8 °C)-98.7 °F (37.1 °C)] 98.6 °F (37 °C)  Pulse:  [] 93  Resp:  [15-22] 19  SpO2:  [92 %-99 %] 96 %  BP: (131-159)/(75-98) 142/87     Weight: 69.2 kg (152 lb 8.9 oz)  Body mass index is 25.39 kg/m².    No intake or output data in the 24 hours ending 05/09/24 1011     Physical Exam  Vitals and nursing note reviewed.   Constitutional:       General: He is not in acute distress.     Appearance: He is ill-appearing. He is not toxic-appearing.   HENT:      Nose:      Comments: NC in place  Cardiovascular:      Rate and Rhythm: Normal rate and regular rhythm.   Pulmonary:      Effort: Pulmonary effort is normal. No respiratory distress.      Breath sounds: Rhonchi and rales present. No wheezing.   Abdominal:      General: There is no distension.   Musculoskeletal:      Right lower leg: Edema present.      Left lower leg: Edema present.   Skin:     General: Skin is warm.   Neurological:      General: No focal deficit present.      Mental Status: He is alert. Mental status is at baseline.          Vents:       Lines/Drains/Airways       Peripheral Intravenous Line  Duration                  Peripheral IV - Single Lumen 05/08/24 18 G Right Wrist 1 day                    Significant Labs:    CBC/Anemia Profile:  Recent Labs   Lab 05/08/24  1805 05/09/24  0600   WBC 9.66 12.10   HGB 15.9 15.9   HCT 50.2 49.5    206    MCV 90 89   RDW 14.2 14.2        Chemistries:  Recent Labs   Lab 05/08/24  1805 05/09/24  0600    141   K 4.3 4.4    110   CO2 23 21*   BUN 10 14   CREATININE 0.8 0.9   CALCIUM 8.7 9.3   ALBUMIN 2.9*  --    PROT 6.9  --    BILITOT 0.2  --    ALKPHOS 98  --    ALT 26  --    AST 27  --    MG  --  2.0   PHOS  --  1.5*       All pertinent labs within the past 24 hours have been reviewed.    Significant Imaging:   I have reviewed all pertinent imaging results/findings within the past 24 hours.

## 2024-05-09 NOTE — PLAN OF CARE
[facility] - Observation 11[facility]  Initial Discharge Assessment       Primary Care Provider: No, Primary Doctor    Admission Diagnosis: Shortness of breath [R06.02]  SOB (shortness of breath) [R06.02]  Pleural effusion [J90]  Tachycardia [R00.0]  Hypoxia [R09.02]  Crack cocaine use [F14.90]  Tobacco use [Z72.0]  Chest pain [R07.9]    Admission Date: 5/8/2024  Expected Discharge Date: 05/08/2024    Sw met pt at bedside. Pt stated she resides with his daughter and he ambulates without assistance. Pt stated he does not use any equipment at home. Pt did stated he used his daughters (inhaler) in the past  but he does not have or use o2.    [facility] [place]  [place]  Emergency Department  [phone]     [facility] [place]  Case Management  Emergency Department  [phone]     Transition of Care Barriers: None    Payor: MEDICAID / Plan: [employer] / Product Type: Managed Medicaid /     Extended Emergency Contact Information  Primary Emergency Contact: Wilfredo Hopper  Mobile Phone: [phone]  Relation: Daughter  Preferred language: English   needed? No    Discharge Plan A: Home with family  Discharge Plan B: Home with family    No Pharmacies Listed    Initial Assessment (most recent)       Adult Discharge Assessment - 05/08/24 9766          Discharge Assessment    Assessment Type Discharge Planning Assessment     Confirmed/corrected address, phone number and insurance Yes     Confirmed Demographics Correct on Facesheet     Source of Information patient     Does patient/caregiver understand observation status Yes     Communicated MITCH with patient/caregiver Date not available/Unable to determine     People in Home child(chato), adult     Do you expect to return to your current living situation? Yes     Do you have help at home or someone to help you manage your care at home? Yes     Who are your caregiver(s) and their phone number(s)? Wilfredo Hopper   [phone]     Prior to hospitilization  cognitive status: Unable to Assess     Current cognitive status: Alert/Oriented     Walking or Climbing Stairs Difficulty no     Dressing/Bathing Difficulty no     Home Layout Able to live on 1st floor     Equipment Currently Used at Home none     Readmission within 30 days? No     Patient currently being followed by outpatient case management? No     Do you currently have service(s) that help you manage your care at home? No     Do you take prescription medications? Yes     Do you have prescription coverage? Yes     Do you have any problems affording any of your prescribed medications? No     Is the patient taking medications as prescribed? yes     Who is going to help you get home at discharge? pt's daughter     How do you get to doctors appointments? car, drives self     Are you on dialysis? No     Do you take coumadin? No     Discharge Plan A Home with family     Discharge Plan B Home with family     DME Needed Upon Discharge  none     Discharge Plan discussed with: Patient     Transition of Care Barriers None        Physical Activity    On average, how many days per week do you engage in moderate to strenuous exercise (like a brisk walk)? 5 days     On average, how many minutes do you engage in exercise at this level? 20 min        Financial Resource Strain    How hard is it for you to pay for the very basics like food, housing, medical care, and heating? Not hard at all        Housing Stability    In the last 12 months, was there a time when you were not able to pay the mortgage or rent on time? No     At any time in the past 12 months, were you homeless or living in a shelter (including now)? No        Transportation Needs    Has the lack of transportation kept you from medical appointments, meetings, work or from getting things needed for daily living? No        Food Insecurity    Within the past 12 months, you worried that your food would run out before you got the money to buy more. Never true     Within  the past 12 months, the food you bought just didn't last and you didn't have money to get more. Never true        Stress    Do you feel stress - tense, restless, nervous, or anxious, or unable to sleep at night because your mind is troubled all the time - these days? Patient declined        Social Isolation    How often do you feel lonely or isolated from those around you?  Patient declined        Alcohol Use    Q1: How often do you have a drink containing alcohol? Never     Q2: How many drinks containing alcohol do you have on a typical day when you are drinking? Patient does not drink     Q3: How often do you have six or more drinks on one occasion? Never        Utilities    In the past 12 months has the electric, gas, oil, or water company threatened to shut off services in your home? No        Health Literacy    How often do you need to have someone help you when you read instructions, pamphlets, or other written material from your doctor or pharmacy? Patient declines to respond

## 2024-05-09 NOTE — ASSESSMENT & PLAN NOTE
- suspect has undiagnosed COPD with acute exacerbation, large L pleural effusion contributing to symptoms  - satting 96% on 3L NC  - s/p solumedrol 125mg IVP  - cont po prednisone  - continue rocephin/azithro  - follow sputum cx  - duonebs

## 2024-05-09 NOTE — ASSESSMENT & PLAN NOTE
- suspect has undiagnosed COPD with acute exacerbation, large L pleural effusion contributing to symptoms  - satting 96% on 3L NC  - s/p solumedrol 125mg IVP  - start prednisone 40mg daily  - continue rocephin/azithro  - follow sputum cx  - dutamika

## 2024-05-09 NOTE — SUBJECTIVE & OBJECTIVE
No past medical history on file.    No past surgical history on file.    Review of patient's allergies indicates:  Not on File    No current facility-administered medications on file prior to encounter.     No current outpatient medications on file prior to encounter.     Family History    None       Tobacco Use    Smoking status: Not on file    Smokeless tobacco: Not on file   Substance and Sexual Activity    Alcohol use: Not on file    Drug use: Not on file    Sexual activity: Not on file     Review of Systems   Constitutional:  Negative for activity change, chills and fever.   HENT:  Negative for trouble swallowing.    Eyes:  Negative for photophobia and visual disturbance.   Respiratory:  Positive for cough, chest tightness and shortness of breath. Negative for wheezing.    Cardiovascular:  Negative for chest pain, palpitations and leg swelling.   Gastrointestinal:  Negative for abdominal pain, constipation, diarrhea, nausea and vomiting.   Genitourinary:  Negative for dysuria, frequency, hematuria and urgency.   Musculoskeletal:  Negative for arthralgias, back pain and gait problem.   Skin:  Negative for color change and rash.   Neurological:  Negative for dizziness, syncope, weakness, light-headedness, numbness and headaches.   Psychiatric/Behavioral:  Negative for agitation and confusion. The patient is not nervous/anxious.      Objective:     Vital Signs (Most Recent):  Temp: 98.5 °F (36.9 °C) (05/08/24 2200)  Pulse: 103 (05/08/24 2200)  Resp: 15 (05/08/24 2200)  BP: (!) 143/75 (05/08/24 2200)  SpO2: 95 % (05/08/24 2200) Vital Signs (24h Range):  Temp:  [98.4 °F (36.9 °C)-98.7 °F (37.1 °C)] 98.5 °F (36.9 °C)  Pulse:  [] 103  Resp:  [15-22] 15  SpO2:  [95 %-99 %] 95 %  BP: (136-159)/(75-98) 143/75     Weight: 68 kg (150 lb)  Body mass index is 24.96 kg/m².     Physical Exam  Vitals and nursing note reviewed.   Constitutional:       General: He is not in acute distress.     Appearance: He is  well-developed.   HENT:      Head: Normocephalic and atraumatic.      Mouth/Throat:      Pharynx: No oropharyngeal exudate.   Eyes:      General: No scleral icterus.     Conjunctiva/sclera: Conjunctivae normal.   Cardiovascular:      Rate and Rhythm: Normal rate and regular rhythm.      Heart sounds: Normal heart sounds.   Pulmonary:      Effort: Pulmonary effort is normal. No respiratory distress.      Breath sounds: Decreased air movement present. Rales present. No wheezing.   Abdominal:      General: Bowel sounds are normal. There is no distension.      Palpations: Abdomen is soft.      Tenderness: There is no abdominal tenderness.   Musculoskeletal:         General: No tenderness. Normal range of motion.      Cervical back: Normal range of motion and neck supple.      Comments: Trace edema to BLEs   Lymphadenopathy:      Cervical: No cervical adenopathy.   Skin:     General: Skin is warm and dry.      Capillary Refill: Capillary refill takes less than 2 seconds.      Findings: No rash.   Neurological:      Mental Status: He is alert and oriented to person, place, and time.      Cranial Nerves: No cranial nerve deficit.      Sensory: No sensory deficit.      Coordination: Coordination normal.   Psychiatric:         Behavior: Behavior normal.         Thought Content: Thought content normal.         Judgment: Judgment normal.                Significant Labs: All pertinent labs within the past 24 hours have been reviewed.  BMP:   Recent Labs   Lab 05/08/24  1805   GLU 90      K 4.3      CO2 23   BUN 10   CREATININE 0.8   CALCIUM 8.7     CBC:   Recent Labs   Lab 05/08/24  1805   WBC 9.66   HGB 15.9   HCT 50.2          Significant Imaging: I have reviewed all pertinent imaging results/findings within the past 24 hours.  X-Ray Chest AP Portable  Narrative: EXAMINATION:  XR CHEST AP PORTABLE    CLINICAL HISTORY:  CHF;    TECHNIQUE:  Single frontal view of the chest was  performed.    COMPARISON:  None    FINDINGS:  Monitoring EKG leads are present.  The trachea is unremarkable.  There is obscuration of the left aspect of the cardiomediastinal silhouette.  There is no evidence of free air beneath the hemidiaphragms.  There is a moderate left-sided pleural effusion with associated compressive atelectasis.  There is no evidence of a pneumothorax.  There is no evidence of pneumomediastinum.  There is pulmonary vascular congestion.  There are bilateral interstitial opacities.  There are degenerative changes in the osseous structures.  Impression: Cardiomegaly with pulmonary vascular congestion and bilateral interstitial opacities, suggestive of pulmonary edema secondary to CHF.    Moderate left-sided pleural effusion with associated compressive atelectasis.    Electronically signed by: Harry Spivey MD  Date:    05/08/2024  Time:    19:38

## 2024-05-09 NOTE — NURSING
Nurses Note -- 4 Eyes      5/8/2024   11:11 PM      Skin assessed during: Admit      [x] No Altered Skin Integrity Present    [x]Prevention Measures Documented      [] Yes- Altered Skin Integrity Present or Discovered   [] LDA Added if Not in Epic (Describe Wound)   [] New Altered Skin Integrity was Present on Admit and Documented in LDA   [] Wound Image Taken    Wound Care Consulted? No    Attending Nurse:  Heydi Becerril RN/Staff Member:   Nataliia

## 2024-05-09 NOTE — CONSULTS
[facility]  Pulmonology  Consult Note    Patient Name: Oscar Waldron  MRN: [id number]  Admission Date: 5/8/2024  Hospital Length of Stay: 0 days  Code Status: Full Code  Attending Physician: Gail Jacome MD  Primary Care Provider: Kenna, Primary Doctor   Principal Problem: Acute respiratory failure with hypoxia    Inpatient consult to Pulmonology  Consult performed by: Salomon Patrick MD  Consult ordered by: Flory Yarbrough PA-C        Subjective:     HPI:  Oscar Waldron is a 64M w/ cocaine abuse, HIV not on HAART, tobacco abuse, HTN who presented to [facility] for evaluation of SOB. He reports having a productive cough with associated left chest pain during coughing episodes. He reports having progressive orthopnea and dyspnea with exertion over the past 2 weeks. He tried using an albuterol inhaler from a family member without relief. Additionally, reports smoking crack cocaine 2 days prior to arrival and has >40 year tobacco use history. He has known HIV and stopped using his medications a few years ago.    Pulmonology consulted for evaluation and management of pleural effusion.    No past medical history on file.    No past surgical history on file.    Review of patient's allergies indicates:  No Known Allergies    Family History    None       Tobacco Use    Smoking status: Not on file    Smokeless tobacco: Not on file   Substance and Sexual Activity    Alcohol use: Not on file    Drug use: Not on file    Sexual activity: Not on file           Objective:     Vital Signs (Most Recent):  Temp: 98.6 °F (37 °C) (05/09/24 0732)  Pulse: 93 (05/09/24 0854)  Resp: 19 (05/09/24 0854)  BP: (!) 142/87 (05/09/24 0732)  SpO2: 96 % (05/09/24 0854) Vital Signs (24h Range):  Temp:  [98.3 °F (36.8 °C)-98.7 °F (37.1 °C)] 98.6 °F (37 °C)  Pulse:  [] 93  Resp:  [15-22] 19  SpO2:  [92 %-99 %] 96 %  BP: (131-159)/(75-98) 142/87     Weight: 69.2 kg (152 lb 8.9 oz)  Body mass index is 25.39 kg/m².    No intake or output  data in the 24 hours ending 05/09/24 1011     Physical Exam  Vitals and nursing note reviewed.   Constitutional:       General: He is not in acute distress.     Appearance: He is ill-appearing. He is not toxic-appearing.   HENT:      Nose:      Comments: NC in place  Cardiovascular:      Rate and Rhythm: Normal rate and regular rhythm.   Pulmonary:      Effort: Pulmonary effort is normal. No respiratory distress.      Breath sounds: Rhonchi and rales present. No wheezing.   Abdominal:      General: There is no distension.   Musculoskeletal:      Right lower leg: Edema present.      Left lower leg: Edema present.   Skin:     General: Skin is warm.   Neurological:      General: No focal deficit present.      Mental Status: He is alert. Mental status is at baseline.          Vents:       Lines/Drains/Airways       Peripheral Intravenous Line  Duration                  Peripheral IV - Single Lumen 05/08/24 18 G Right Wrist 1 day                    Significant Labs:    CBC/Anemia Profile:  Recent Labs   Lab 05/08/24  1805 05/09/24  0600   WBC 9.66 12.10   HGB 15.9 15.9   HCT 50.2 49.5    206   MCV 90 89   RDW 14.2 14.2        Chemistries:  Recent Labs   Lab 05/08/24  1805 05/09/24  0600    141   K 4.3 4.4    110   CO2 23 21*   BUN 10 14   CREATININE 0.8 0.9   CALCIUM 8.7 9.3   ALBUMIN 2.9*  --    PROT 6.9  --    BILITOT 0.2  --    ALKPHOS 98  --    ALT 26  --    AST 27  --    MG  --  2.0   PHOS  --  1.5*       All pertinent labs within the past 24 hours have been reviewed.    Significant Imaging:   I have reviewed all pertinent imaging results/findings within the past 24 hours.  Assessment/Plan:     Pulmonary  * Acute respiratory failure with hypoxia  Patient with Hypoxic Respiratory failure which is Acute.  he is not on home oxygen. Supplemental oxygen was provided and noted-      .   Signs/symptoms of respiratory failure include- tachypnea, increased work of breathing, and respiratory distress.  "Contributing diagnoses includes - Pleural effusion and Pneumonia Labs and images were reviewed. Patient Has recent ABG, which has been reviewed. Will treat underlying causes and adjust management of respiratory failure as follows- See pleural effusion    Pleural effusion, left  Patient found to have moderate pleural effusion on imaging. I have personally reviewed and interpreted the following imaging: Xray, CT, and Ultrasound. A thoracentesis was performed. Pleural fluid was sent for analysis. Labs reviewed, including Serum LDH   LD   Date Value Ref Range Status   05/09/2024 281 (H) 110 - 260 U/L Final     Comment:     Results are increased in hemolyzed samples.   , Pleural Fluid LDH No results found for: "LDHFL" , Serum Protein   Total Protein   Date Value Ref Range Status   05/08/2024 6.9 6.0 - 8.4 g/dL Final    , Pleural Protein No results found for: "PROTEINBODYF" , Cell Count and Differential No results found for: "BFCELLCNT" , Cytology No results found for: "CYTOFLUID" , and Fluid Cultures No results found for: "BODYFLUIDCUL" . Fluid most consistent with PENDING.  . Most likely etiology includes  PENDING . Management to include  PENDING FURTHER WORKUP    64M with HIV and pulmonary nodules admitted for acute hypoxic respiratory failure in the setting of pneumonia and left pleural effusion. CT chest concerning for RML nodule, SUZANNA nodule, LLL consolidation, left pleural effusion, emphysematous changes. Nodules appear spiculated and concerning for possible malignancy. Pulmonology consulted for evaluation and management of pleural effusion.    Evaluated left pleural effusion with bedside ultrasound. Adequate pocket identified. Patient consented for bedside thoracentesis. Sending off thoracentesis labs including cytology and GMS stain.    RECOMMENDATIONS / PLAN:  -- Thoracentesis performed at bedside 5/9  -- F/u post thoracentesis CXR  -- F/u thoracentesis labs  -- F/u cytology  -- F/u respiratory cultures  -- " Antimicrobials per ID          Thank you for your consult. Please contact us if you have any additional questions.     Salomon Patrick MD  Pulmonology  [facility] - Observation 11[facility]

## 2024-05-09 NOTE — SUBJECTIVE & OBJECTIVE
Interval History: HDS, 3L NC. NAEON. CT reviewed, consider further imaging. Continue abx, steroids, duonebs. Added mucinex and tessalon.     Review of Systems   Constitutional:  Negative for chills and fever.   Respiratory:  Positive for cough, chest tightness and shortness of breath.    Cardiovascular:  Negative for chest pain and leg swelling.   Gastrointestinal:  Negative for abdominal pain and nausea.   Neurological:  Negative for dizziness and weakness.     Objective:     Vital Signs (Most Recent):  Temp: 98.6 °F (37 °C) (05/09/24 0732)  Pulse: 93 (05/09/24 0854)  Resp: 19 (05/09/24 0854)  BP: (!) 142/87 (05/09/24 0732)  SpO2: 96 % (05/09/24 0854) Vital Signs (24h Range):  Temp:  [98.3 °F (36.8 °C)-98.7 °F (37.1 °C)] 98.6 °F (37 °C)  Pulse:  [] 93  Resp:  [15-22] 19  SpO2:  [92 %-99 %] 96 %  BP: (131-159)/(75-98) 142/87     Weight: 69.2 kg (152 lb 8.9 oz)  Body mass index is 25.39 kg/m².  No intake or output data in the 24 hours ending 05/09/24 0946      Physical Exam  Vitals and nursing note reviewed.   Constitutional:       Appearance: He is well-developed.   Eyes:      Pupils: Pupils are equal, round, and reactive to light.   Cardiovascular:      Rate and Rhythm: Normal rate and regular rhythm.   Pulmonary:      Effort: Pulmonary effort is normal.      Breath sounds: Decreased air movement present. Rales present.   Abdominal:      Palpations: Abdomen is soft.      Tenderness: There is no abdominal tenderness.   Musculoskeletal:         General: No tenderness.      Right lower leg: Edema (trace) present.      Left lower leg: Edema (trace) present.   Skin:     General: Skin is warm and dry.   Neurological:      Mental Status: He is alert and oriented to person, place, and time.   Psychiatric:         Behavior: Behavior normal.             Significant Labs: All pertinent labs within the past 24 hours have been reviewed.  CBC:   Recent Labs   Lab 05/08/24  1805 05/09/24  0600   WBC 9.66 12.10   HGB 15.9  15.9   HCT 50.2 49.5    206     CMP:   Recent Labs   Lab 05/08/24  1805 05/09/24  0600    141   K 4.3 4.4    110   CO2 23 21*   GLU 90 138*   BUN 10 14   CREATININE 0.8 0.9   CALCIUM 8.7 9.3   PROT 6.9  --    ALBUMIN 2.9*  --    BILITOT 0.2  --    ALKPHOS 98  --    AST 27  --    ALT 26  --    ANIONGAP 8 10       Significant Imaging: I have reviewed all pertinent imaging results/findings within the past 24 hours.

## 2024-05-09 NOTE — CONSULTS
[facility]  Infectious Disease  Consult Note    Patient Name: Oscar Waldron  MRN: [id number]  Admission Date: 5/8/2024  Hospital Length of Stay: 0 days  Attending Physician: Gail Jacome MD  Primary Care Provider: No, Primary Doctor     Isolation Status: Airborne    Patient information was obtained from patient and ER records.      Inpatient consult to Infectious Diseases  Consult performed by: Luc Shaffer MD  Consult ordered by: Flory Yarbrough PA-C        Assessment/Plan:     Pulmonary  * Acute respiratory failure with hypoxia  64 y.o. male with a PMHx of cocaine abuse, HIV not on HAART, tobacco abuse, HTN who present to [facility] for SOB of at least two weeks, endorsed crack cocaine use 2 days prior. Presentation notable for hypoxia, CT showed L apex spiculated nodule, RML GGO changes, multiple small b/l bnodules and large L effusion s/p thoracentesis by pulmonary at bedside, fluid studies and cultures pending. He remains afebrile, HDS. HIV labs pending, non adherence since 2020 reported.     Clinical picture suggests ddx of malignancy, bacterial pneumonia, tuberculosis or atypical mycobacterial infection, and less likely but possible fungal infection (halo sign noted by pulmonary), PJP also less likely based on imaging and clinical picture. I suspect most likely malignancy along with superimposed bacterial pneumonia, ordered additional testing.     Recommendations    -Follow up pleural fluid WBC & diff, gram stain, aerobic culture, AFB, fungal culture, cytology and ADA  -MTB rule out: airborne precautions, AFB sputum x 3  by 8 hours with one early AM specimen, MTB PCR from sputum   -Follow up PJP PCR from sputum, if possible to obtain more samples  -Agree with ceftriaxone and azithromycin for now.         Thank you for your consult. I will follow-up with patient. Please contact us if you have any additional questions.    Luc Shaffer MD  Infectious Disease  [facility]  [facility]    Subjective:     Principal Problem: Acute respiratory failure with hypoxia    HPI: 64 y.o. male with a PMHx of cocaine abuse, HIV not on HAART, tobacco abuse, HTN who present to [facility] for progressively worsening SOB, endorsed crack cocaine use 2 days prior.     HIV hx: Chart documentation shows he was last seen by Vy Rosa MD back in 5/2020, VL undetectable 1/2019 with CD4 26% at that time, but high concern for non adherence, was missing 2-3 doses weekly while on Genvoya. He remains afebrile, no significant WBC change. Started on azithromycin and ceftriaxone, along with steroids. HDS. S/p thoracentesis by pulmonary at bedside, fluid studies and cultures pending.     CT chest showed a spiculated nodule L apex, GGO RML, and b/l irregular small nodules, mod to large left pleural effusion, worrisome for metastatic process. Patient tells us that he was doing okay prior to onset of symptoms but then suddenly developed acute onset shortness of breath with progressive worsening, some congestion, occasional productive cough which is dry most of the time but sometimes notable for brown sputum.  He denies hemoptysis.  Denies fevers, night sweats, rigors, but states he occasionally will have chills.  He denies sick contacts, exotic animals, exotic hobbies, or recent travel.  He states he has been off HIV medication since the COVID pandemic.  He denies prior intolerance to Genvoya and states he tolerated medication well.  He has a 40 pack-year smoking history and also admits to smoking crack pipe 2 days prior to admission.  He states that he quit tobacco a few weeks ago.  He denies diarrhea, recent trauma, new skin ulcers or lesions, pleuritic pain, prior oxygen dependence.  Denies antibiotic allergies.  Rai known exposure to persons with tuberculosis.ID consulted for HIV not on HAART.             No past medical history on file.    No past surgical history on file.    Review of patient's  "allergies indicates:  No Known Allergies    Medications:  No medications prior to admission.     Antibiotics (From admission, onward)      Start     Stop Route Frequency Ordered    05/09/24 0900  cefTRIAXone (Rocephin) 1 g in dextrose 5 % in water (D5W) 100 mL IVPB (MB+)  (CEFTRIAXONE IV/ AZITHROMYCIN PO PANEL)        Placed in "And" Linked Group    -- IV Every 24 hours (non-standard times) 05/08/24 2112 05/09/24 0900  azithromycin tablet 250 mg  (CEFTRIAXONE IV/ AZITHROMYCIN PO PANEL)        Placed in "And" Linked Group    05/13/24 0859 Oral Daily 05/08/24 2112          Antifungals (From admission, onward)      None          Antivirals (From admission, onward)      None               There is no immunization history on file for this patient.    Family History    None       Social History     Socioeconomic History    Marital status: Single     Social Determinants of Health     Financial Resource Strain: Low Risk  (5/8/2024)    Overall Financial Resource Strain (CARDIA)     Difficulty of Paying Living Expenses: Not hard at all   Food Insecurity: No Food Insecurity (5/8/2024)    Hunger Vital Sign     Worried About Running Out of Food in the Last Year: Never true     Ran Out of Food in the Last Year: Never true   Transportation Needs: No Transportation Needs (5/8/2024)    TRANSPORTATION NEEDS     Transportation : No   Physical Activity: Insufficiently Active (5/8/2024)    Exercise Vital Sign     Days of Exercise per Week: 5 days     Minutes of Exercise per Session: 20 min   Stress: Patient Declined (5/8/2024)    [employer] [place] [employer] - Occupational Stress Questionnaire     Feeling of Stress : Patient declined   Housing Stability: Low Risk  (5/8/2024)    Housing Stability Vital Sign     Unable to Pay for Housing in the Last Year: No     Homeless in the Last Year: No     Review of Systems   Constitutional:  Positive for chills and fatigue. Negative for fever.   HENT:  Negative for trouble " swallowing.    Respiratory:  Positive for cough and shortness of breath.    Gastrointestinal:  Negative for abdominal pain, blood in stool, diarrhea and vomiting.   Genitourinary:  Negative for dysuria and hematuria.   Musculoskeletal:  Negative for arthralgias, joint swelling and neck stiffness.   Neurological:  Negative for syncope.   Psychiatric/Behavioral:  Negative for confusion.      Objective:     Vital Signs (Most Recent):  Temp: 98.6 °F (37 °C) (05/09/24 0732)  Pulse: 93 (05/09/24 0854)  Resp: 19 (05/09/24 0854)  BP: (!) 142/87 (05/09/24 0732)  SpO2: 96 % (05/09/24 0854) Vital Signs (24h Range):  Temp:  [98.3 °F (36.8 °C)-98.7 °F (37.1 °C)] 98.6 °F (37 °C)  Pulse:  [] 93  Resp:  [15-22] 19  SpO2:  [92 %-99 %] 96 %  BP: (131-159)/(75-98) 142/87     Weight: 69.2 kg (152 lb 8.9 oz)  Body mass index is 25.39 kg/m².    Estimated Creatinine Clearance: 74 mL/min (based on SCr of 0.9 mg/dL).     Physical Exam  Constitutional:       Appearance: Normal appearance.   HENT:      Head: Normocephalic and atraumatic.      Nose: Nose normal.      Mouth/Throat:      Mouth: Mucous membranes are moist.      Pharynx: Oropharynx is clear.   Eyes:      Conjunctiva/sclera: Conjunctivae normal.   Cardiovascular:      Rate and Rhythm: Normal rate and regular rhythm.      Pulses: Normal pulses.      Heart sounds: Normal heart sounds.   Pulmonary:      Effort: Respiratory distress present.      Breath sounds: Rales present.      Comments: O2 support  Abdominal:      General: Bowel sounds are normal.      Palpations: Abdomen is soft.      Tenderness: There is no guarding or rebound.   Musculoskeletal:         General: No deformity.      Cervical back: Neck supple.      Right lower leg: Edema present.      Left lower leg: Edema present.   Skin:     General: Skin is warm and dry.      Coloration: Skin is not jaundiced.      Findings: No rash.   Neurological:      Mental Status: He is alert and oriented to person, place, and time.  Mental status is at baseline.          Significant Labs:   Microbiology Results (last 7 days)       Procedure Component Value Units Date/Time    Blood culture (site 1) [[id number]] Collected: 05/08/24 2205    Order Status: Completed Specimen: Blood from Peripheral, Antecubital, Right Updated: 05/09/24 0515     Blood Culture, Routine No Growth to date    Narrative:      Site # 1, aerobic and anaerobic    Blood culture (site 2) [[id number]] Collected: 05/08/24 2205    Order Status: Completed Specimen: Blood from Peripheral, Antecubital, Right Updated: 05/09/24 0515     Blood Culture, Routine No Growth to date    Narrative:      Site # 2, aerobic only    Culture, Respiratory with Gram Stain [[id number]]     Order Status: No result Specimen: Sputum, Expectorated           All pertinent labs within the past 24 hours have been reviewed.    Significant Imaging: I have reviewed all pertinent imaging results/findings within the past 24 hours.

## 2024-05-09 NOTE — HPI
64 y.o. male with a PMHx of cocaine abuse, HIV not on HAART, tobacco abuse, HTN who present to [facility] [place] for progressively worsening SOB, endorsed crack cocaine use 2 days prior.     HIV hx: Chart documentation shows he was last seen by Vy Rosa MD back in 5/2020, VL undetectable 1/2019 with CD4 26% at that time, but high concern for non adherence, was missing 2-3 doses weekly while on Genvoya. He remains afebrile, no significant WBC change. Started on azithromycin and ceftriaxone, along with steroids. HDS. S/p thoracentesis by pulmonary at bedside, fluid studies and cultures pending.     CT chest showed a spiculated nodule L apex, GGO RML, and b/l irregular small nodules, mod to large left pleural effusion, worrisome for metastatic process. Patient tells us that he was doing okay prior to onset of symptoms but then suddenly developed acute onset shortness of breath with progressive worsening, some congestion, occasional productive cough which is dry most of the time but sometimes notable for brown sputum.  He denies hemoptysis.  Denies fevers, night sweats, rigors, but states he occasionally will have chills.  He denies sick contacts, exotic animals, exotic hobbies, or recent travel.  He states he has been off HIV medication since the COVID pandemic.  He denies prior intolerance to Genvoya and states he tolerated medication well.  He has a 40 pack-year smoking history and also admits to smoking crack pipe 2 days prior to admission.  He states that he quit tobacco a few weeks ago.  He denies diarrhea, recent trauma, new skin ulcers or lesions, pleuritic pain, prior oxygen dependence.  Denies antibiotic allergies.  Rai known exposure to persons with tuberculosis.ID consulted for HIV not on HAART.

## 2024-05-09 NOTE — HOSPITAL COURSE
Oscar Waldron is placed in  Observation for management of acute respiratory failure with hypoxia. Requiring supplemental oxygen on admit. CT chest reviewed, concern for malignant/metastatic process vs infectious process. Started on IV rocephin and azithromycin. Pulmonology and ID consulted. S/p thoracentesis for L pleural effusion 5/9 with 950 mL removed & showing exudative process. Infectious workup negative. ID recommending Bactrim for ppx in setting of AIDS; patient may follow up with his outpatient HOP clinic if pt desires to resume ART. Pleural fluid cytology unfortunately showed adenocarcinoma. Palliative Care consulted. Patient opted for hospice care. Required additional thora's on 5/15, 5/17, 5/19. Recurrent malignant effusion causing hypoxia so PleurX placed 5/20. Discharged to NH with hospice. Plan for PleurX drainage at least twice weekly, up to 1 L drainage at a time. Patient received education on how to drain catheter, and will also receive assistance with supplies, etc via hospice company.

## 2024-05-09 NOTE — PLAN OF CARE
Pt arrived to unit via stretcher, AAOX4. Able to make needs known. Ambulated in room w/o difficulty. O2/2L/NC. Resp even and unlabored, wheezing noted. Skin w/d to touch, intact. NAD noted at present. Assisted with changing into hospital gown and non skid socks. Urinal at bedside. Instructed pt to notify staff for assistance needed. Will continue to monitor.  Problem: Adult Inpatient Plan of Care  Goal: Plan of Care Review  5/8/2024 2345 by Rafat Meneses RN  Outcome: Progressing  5/8/2024 2344 by Rafat Meneses RN  Flowsheets (Taken 5/8/2024 2344)  Plan of Care Reviewed With: patient  Goal: Patient-Specific Goal (Individualized)  Outcome: Progressing  Goal: Absence of Hospital-Acquired Illness or Injury  Outcome: Progressing  Goal: Optimal Comfort and Wellbeing  Outcome: Progressing  Goal: Readiness for Transition of Care  Outcome: Progressing     Problem: COPD (Chronic Obstructive Pulmonary Disease)  Goal: Optimal Chronic Illness Coping  Outcome: Progressing  Goal: Optimal Level of Functional [place]  Outcome: Progressing  Goal: Absence of Infection Signs and Symptoms  Outcome: Progressing  Goal: Improved Oral Intake  Outcome: Progressing  Goal: Effective Oxygenation and Ventilation  Outcome: Progressing     Problem: Infection  Goal: Absence of Infection Signs and Symptoms  Outcome: Progressing

## 2024-05-09 NOTE — ASSESSMENT & PLAN NOTE
- unclear cause >> new onset CHF vs infection vs malignancy  - BNP <10  - trial lasix 20mg IVP  - CT chest reviewed  - pulm consulted, appreciate assistance

## 2024-05-09 NOTE — ED NOTES
Telemetry Verification   Patient placed on Telemetry Box  Verified with War Room  Box # [id number]   Monitor Tech Max   Rate 102   Rhythm Sinus tach

## 2024-05-09 NOTE — ASSESSMENT & PLAN NOTE
- unclear cause- new onset CHF vs infection vs malignancy  - BNP <10  - trial lasix 20mg IVP  - CT chest non con ordered for further eval  - pulm consulted; appreciate recs

## 2024-05-09 NOTE — ASSESSMENT & PLAN NOTE
- previously on Genvoya but quit taking years ago  - CD4 in process  - ID consulted for HAART recs

## 2024-05-09 NOTE — ASSESSMENT & PLAN NOTE
- not on home meds  - start med if BP still uncontrolled s/p lasix  - may benefit from ACE/ARB if echo shows CHF

## 2024-05-09 NOTE — ASSESSMENT & PLAN NOTE
"Patient found to have moderate pleural effusion on imaging. I have personally reviewed and interpreted the following imaging: Xray, CT, and Ultrasound. A thoracentesis was performed. Pleural fluid was sent for analysis. Labs reviewed, including Serum LDH   LD   Date Value Ref Range Status   05/09/2024 281 (H) 110 - 260 U/L Final     Comment:     Results are increased in hemolyzed samples.   , Pleural Fluid LDH No results found for: "LDHFL" , Serum Protein   Total Protein   Date Value Ref Range Status   05/08/2024 6.9 6.0 - 8.4 g/dL Final    , Pleural Protein No results found for: "PROTEINBODYF" , Cell Count and Differential No results found for: "BFCELLCNT" , Cytology No results found for: "CYTOFLUID" , and Fluid Cultures No results found for: "BODYFLUIDCUL" . Fluid most consistent with PENDING.  . Most likely etiology includes  PENDING . Management to include  PENDING FURTHER WORKUP    64M with HIV and pulmonary nodules admitted for acute hypoxic respiratory failure in the setting of pneumonia and left pleural effusion. CT chest concerning for RML nodule, SUZANNA nodule, LLL consolidation, left pleural effusion, emphysematous changes. Nodules appear spiculated and concerning for possible malignancy. Pulmonology consulted for evaluation and management of pleural effusion.    Evaluated left pleural effusion with bedside ultrasound. Adequate pocket identified. Patient consented for bedside thoracentesis. Sending off thoracentesis labs including cytology and GMS stain.    RECOMMENDATIONS / PLAN:  -- Thoracentesis performed at bedside 5/9  -- F/u post thoracentesis CXR  -- F/u thoracentesis labs  -- F/u cytology  -- F/u respiratory cultures  -- Antimicrobials per ID  "

## 2024-05-09 NOTE — NURSING
Nurse received positive blood cultures of gram positive blood culture resembling staph, Dr Becerril notified by secure chat.  Will continue to monitor.

## 2024-05-09 NOTE — ED NOTES
RN observed v-tach rhythm on central monitor at 2030. RN assessed pt immediately. Pt appeared in NAD, resting with eyes closed in stretcher, unlabored breathing and even chest rise and fall. Pt denied any chest pain or sense of doom. Dr. Gilmore notified, EKG ordered. EKG performed immediately by RN and shown to Dr. Gilmore. Per Dr. Gilmore, ok for pt to stay on cardiac monitoring, no need for zole monitor and pads.

## 2024-05-09 NOTE — PLAN OF CARE
Problem: Adult Inpatient Plan of Care  Goal: Plan of Care Review  5/9/2024 0645 by Rafat Meneses RN  Outcome: Progressing  5/8/2024 2345 by Rafat Meneses RN  Outcome: Progressing  5/8/2024 2344 by Rafat Meneses RN  Flowsheets (Taken 5/8/2024 2344)  Plan of Care Reviewed With: patient  Goal: Patient-Specific Goal (Individualized)  5/9/2024 0645 by Rafat Meneses RN  Outcome: Progressing  5/8/2024 2345 by Rafat Meneses RN  Outcome: Progressing  Goal: Absence of Hospital-Acquired Illness or Injury  5/9/2024 0645 by Rafat Meneses RN  Outcome: Progressing  5/8/2024 2345 by Rafat Meneses RN  Outcome: Progressing  Goal: Optimal Comfort and Wellbeing  5/9/2024 0645 by Rafat Meneses RN  Outcome: Progressing  5/8/2024 2345 by Rafat Meneses RN  Outcome: Progressing  Goal: Readiness for Transition of Care  5/9/2024 0645 by Rafat Meneses RN  Outcome: Progressing  5/8/2024 2345 by Rafat Meneses RN  Outcome: Progressing     Problem: COPD (Chronic Obstructive Pulmonary Disease)  Goal: Optimal Chronic Illness Coping  5/9/2024 0645 by Rafat Meneses RN  Outcome: Progressing  5/8/2024 2345 by Rafat Meneses RN  Outcome: Progressing  Goal: Optimal Level of Functional [place]  5/9/2024 0645 by Rafat Meneses RN  Outcome: Progressing  5/8/2024 2345 by Rafat Meneses RN  Outcome: Progressing  Goal: Absence of Infection Signs and Symptoms  5/9/2024 0645 by Rafat Meneses RN  Outcome: Progressing  5/8/2024 2345 by Rafat Meneses RN  Outcome: Progressing  Goal: Improved Oral Intake  5/9/2024 0645 by Rafat Meneses, RN  Outcome: Progressing  5/8/2024 2345 by Rafat Meneses, RN  Outcome: Progressing  Goal: Effective Oxygenation and  Ventilation  5/9/2024 0645 by Rafat Meneses RN  Outcome: Progressing  5/8/2024 2345 by Rafat Meneses RN  Outcome: Progressing     Problem: Infection  Goal: Absence of Infection Signs and Symptoms  5/9/2024 0645 by Rafat Meneses RN  Outcome: Progressing  5/8/2024 2345 by Rafat Meneses RN  Outcome: Progressing

## 2024-05-09 NOTE — PROGRESS NOTES
[facility] [address] [place] [address] Medicine  Progress Note    Patient Name: Oscar Waldron  MRN: [id number]  Patient Class: OP- Observation   Admission Date: 5/8/2024  Length of Stay: 0 days  Attending Physician: Gail Jacome MD  Primary Care Provider: Kenna, Primary Doctor        Subjective:     Principal Problem:Acute respiratory failure with hypoxia        HPI:  Oscar Waldron is a 64 y.o. male with a PMHx of cocaine abuse, HIV not on HAART, tobacco abuse, HTN who present to [facility] for evaluation of shortness of breath. Patient reports progressive worsening of shortness of breath over the past few weeks. He now has a  productive cough with associated left sided chest/ left upper abdomen pain which only occurs during coughing fits. Shortness of breath worsens while lying flat and with minimal exertion. No improvement with albuterol inhaler that he borrowed from a family member. Admits to smoking crack cocaine 2 days ago. Has >40 year hx of tobacco abuse. He stopped taking his HIV medications a few years ago.      ED: tachycardic to , improved without intervention. Satting 96% on 3L NC. BNP <10, trop 0.010. EKG with non-specifically TWAs. CXR with cardiomegaly with pulmonary vascular congestion and bilateral interstitial opacities, moderate left-sided pleural effusion with associated compressive atelectasis. Given solumedrol 125mg, azithro and rocephin.     Overview/Hospital Course:  Oscar Waldron is placed in  Observation for management of acute respiratory failure with hypoxia. Requiring supplemental oxygen on admit. CXR reviewed. Received antibiotics and IV steroids in ED. Started on oral prednisone, continuing IV rocephin and azithromycin. CT chest reviewed, concern for lung cancer and metastatic process.     Interval History: HDS, 3L NC. NAEON. CT reviewed, concern for lung cancer and metastatic disease, consider further imaging. Continue abx, steroids, duonebs. Added mucinex and tessalon. Patient continues to  need supplemental oxygen. Pulmonology and ID consulted.     Review of Systems   Constitutional:  Negative for chills and fever.   Respiratory:  Positive for cough, chest tightness and shortness of breath.    Cardiovascular:  Negative for chest pain and leg swelling.   Gastrointestinal:  Negative for abdominal pain and nausea.   Neurological:  Negative for dizziness and weakness.     Objective:     Vital Signs (Most Recent):  Temp: 98.6 °F (37 °C) (05/09/24 0732)  Pulse: 93 (05/09/24 0854)  Resp: 19 (05/09/24 0854)  BP: (!) 142/87 (05/09/24 0732)  SpO2: 96 % (05/09/24 0854) Vital Signs (24h Range):  Temp:  [98.3 °F (36.8 °C)-98.7 °F (37.1 °C)] 98.6 °F (37 °C)  Pulse:  [] 93  Resp:  [15-22] 19  SpO2:  [92 %-99 %] 96 %  BP: (131-159)/(75-98) 142/87     Weight: 69.2 kg (152 lb 8.9 oz)  Body mass index is 25.39 kg/m².  No intake or output data in the 24 hours ending 05/09/24 0946      Physical Exam  Vitals and nursing note reviewed.   Constitutional:       Appearance: He is well-developed.   Eyes:      Pupils: Pupils are equal, round, and reactive to light.   Cardiovascular:      Rate and Rhythm: Normal rate and regular rhythm.   Pulmonary:      Effort: Pulmonary effort is normal.      Breath sounds: Decreased air movement present. Rales present.   Abdominal:      Palpations: Abdomen is soft.      Tenderness: There is no abdominal tenderness.   Musculoskeletal:         General: No tenderness.      Right lower leg: Edema (trace) present.      Left lower leg: Edema (trace) present.   Skin:     General: Skin is warm and dry.   Neurological:      Mental Status: He is alert and oriented to person, place, and time.   Psychiatric:         Behavior: Behavior normal.             Significant Labs: All pertinent labs within the past 24 hours have been reviewed.  CBC:   Recent Labs   Lab 05/08/24  1805 05/09/24  0600   WBC 9.66 12.10   HGB 15.9 15.9   HCT 50.2 49.5    206     CMP:   Recent Labs   Lab 05/08/24  1805  05/09/24  0600    141   K 4.3 4.4    110   CO2 23 21*   GLU 90 138*   BUN 10 14   CREATININE 0.8 0.9   CALCIUM 8.7 9.3   PROT 6.9  --    ALBUMIN 2.9*  --    BILITOT 0.2  --    ALKPHOS 98  --    AST 27  --    ALT 26  --    ANIONGAP 8 10       Significant Imaging: I have reviewed all pertinent imaging results/findings within the past 24 hours.    Assessment/Plan:      * Acute respiratory failure with hypoxia  - suspect has undiagnosed COPD with acute exacerbation, large L pleural effusion contributing to symptoms  - satting 96% on 3L NC  - s/p solumedrol 125mg IVP  - cont po prednisone  - continue rocephin/azithro  - follow sputum cx  - duonebs     Hypophosphatemia  - phos 1.5, replaced  - daily phos    Pleural effusion, left  - unclear cause >> new onset CHF vs infection vs malignancy  - BNP <10  - trial lasix 20mg IVP  - CT chest reviewed  - pulm consulted, appreciate assistance    High cholesterol  - not on statin  - lipid panel reviewed    HTN (hypertension)  - not on home meds  - start med if BP still uncontrolled s/p lasix  - may benefit from ACE/ARB if echo shows CHF    Human immunodeficiency virus (HIV) disease  - previously on Genvoya but quit taking years ago  - CD4 in process  - ID consulted for HAART recs       VTE Risk Mitigation (From admission, onward)           Ordered     Place WILBER hose  Until discontinued         05/08/24 2112     IP VTE LOW RISK PATIENT  Once         05/08/24 2112                    Discharge Planning   MITCH: 5/10/2024     Code Status: Full Code   Is the patient medically ready for discharge?:     Reason for patient still in hospital (select all that apply): Patient trending condition, Laboratory test, Treatment, Imaging, and Consult recommendations  Discharge Plan A: Home with family                  Wily Alvarez PA-C  Department of Hospital Medicine   [facility] 11H

## 2024-05-09 NOTE — HPI
Oscar Waldron is a 64M w/ cocaine abuse, HIV not on HAART, tobacco abuse, HTN who presented to [facility] [place] for evaluation of SOB. He reports having a productive cough with associated left chest pain during coughing episodes. He reports having progressive orthopnea and dyspnea with exertion over the past 2 weeks. He tried using an albuterol inhaler from a family member without relief. Additionally, reports smoking crack cocaine 2 days prior to arrival and has >40 year tobacco use history. He has known HIV and stopped using his medications a few years ago.    Pulmonology consulted for evaluation and management of pleural effusion.

## 2024-05-09 NOTE — ASSESSMENT & PLAN NOTE
- not on home meds  - start med if BP still uncontrolled s/p lasix-- may benefit from ACE/ARB if echo shows CHF

## 2024-05-09 NOTE — ASSESSMENT & PLAN NOTE
64 y.o. male with a PMHx of cocaine abuse, HIV not on HAART, tobacco abuse, HTN who [place] [facility] [place] of at least two weeks, endorsed crack cocaine use 2 days prior. Presentation notable for hypoxia, CT showed L apex spiculated nodule, RML GGO changes, multiple small b/l bnodules and large L effusion s/p thoracentesis by pulmonary at bedside, fluid studies and cultures pending. He remains afebrile, HDS. HIV labs pending, non adherence since 2020 reported.     Clinical picture suggests ddx of malignancy, bacterial pneumonia, tuberculosis or atypical mycobacterial infection, and less likely but possible fungal infection (halo sign noted by pulmonary), PJP also less likely based on imaging and clinical picture. I suspect most likely malignancy along with superimposed bacterial pneumonia, ordered additional testing.     Recommendations    -Follow up pleural fluid WBC & diff, gram stain, aerobic culture, AFB, fungal culture, cytology and ADA  -MTB rule out: airborne precautions, AFB sputum x 3  by 8 hours with one early AM specimen, MTB PCR from sputum   -Follow up PJP PCR from sputum, if possible to obtain more samples  -Agree with ceftriaxone and azithromycin for now.

## 2024-05-09 NOTE — SUBJECTIVE & OBJECTIVE
"No past medical history on file.    No past surgical history on file.    Review of patient's allergies indicates:  No Known Allergies    Medications:  No medications prior to admission.     Antibiotics (From admission, onward)      Start     Stop Route Frequency Ordered    05/09/24 0900  cefTRIAXone (Rocephin) 1 g in dextrose 5 % in water (D5W) 100 mL IVPB (MB+)  (CEFTRIAXONE IV/ AZITHROMYCIN PO PANEL)        Placed in "And" Linked Group    -- IV Every 24 hours (non-standard times) 05/08/24 2112 05/09/24 0900  azithromycin tablet 250 mg  (CEFTRIAXONE IV/ AZITHROMYCIN PO PANEL)        Placed in "And" Linked Group    05/13/24 0859 Oral Daily 05/08/24 2112          Antifungals (From admission, onward)      None          Antivirals (From admission, onward)      None               There is no immunization history on file for this patient.    Family History    None       Social History     Socioeconomic History    Marital status: Single     Social Determinants of Health     Financial Resource Strain: Low Risk  (5/8/2024)    Overall Financial Resource Strain (CARDIA)     Difficulty of Paying Living Expenses: Not hard at all   Food Insecurity: No Food Insecurity (5/8/2024)    Hunger Vital Sign     Worried About Running Out of Food in the Last Year: Never true     Ran Out of Food in the Last Year: Never true   Transportation Needs: No Transportation Needs (5/8/2024)    TRANSPORTATION NEEDS     Transportation : No   Physical Activity: Insufficiently Active (5/8/2024)    Exercise Vital Sign     Days of Exercise per Week: 5 days     Minutes of Exercise per Session: 20 min   Stress: Patient Declined (5/8/2024)    [employer] [place] [employer] - Occupational Stress Questionnaire     Feeling of Stress : Patient declined   Housing Stability: Low Risk  (5/8/2024)    Housing Stability Vital Sign     Unable to Pay for Housing in the Last Year: No     Homeless in the Last Year: No     Review of Systems   Constitutional:  " Positive for chills and fatigue. Negative for fever.   HENT:  Negative for trouble swallowing.    Respiratory:  Positive for cough and shortness of breath.    Gastrointestinal:  Negative for abdominal pain, blood in stool, diarrhea and vomiting.   Genitourinary:  Negative for dysuria and hematuria.   Musculoskeletal:  Negative for arthralgias, joint swelling and neck stiffness.   Neurological:  Negative for syncope.   Psychiatric/Behavioral:  Negative for confusion.      Objective:     Vital Signs (Most Recent):  Temp: 98.6 °F (37 °C) (05/09/24 0732)  Pulse: 93 (05/09/24 0854)  Resp: 19 (05/09/24 0854)  BP: (!) 142/87 (05/09/24 0732)  SpO2: 96 % (05/09/24 0854) Vital Signs (24h Range):  Temp:  [98.3 °F (36.8 °C)-98.7 °F (37.1 °C)] 98.6 °F (37 °C)  Pulse:  [] 93  Resp:  [15-22] 19  SpO2:  [92 %-99 %] 96 %  BP: (131-159)/(75-98) 142/87     Weight: 69.2 kg (152 lb 8.9 oz)  Body mass index is 25.39 kg/m².    Estimated Creatinine Clearance: 74 mL/min (based on SCr of 0.9 mg/dL).     Physical Exam  Constitutional:       Appearance: Normal appearance.   HENT:      Head: Normocephalic and atraumatic.      Nose: Nose normal.      Mouth/Throat:      Mouth: Mucous membranes are moist.      Pharynx: Oropharynx is clear.   Eyes:      Conjunctiva/sclera: Conjunctivae normal.   Cardiovascular:      Rate and Rhythm: Normal rate and regular rhythm.      Pulses: Normal pulses.      Heart sounds: Normal heart sounds.   Pulmonary:      Effort: Respiratory distress present.      Breath sounds: Rales present.      Comments: O2 support  Abdominal:      General: Bowel sounds are normal.      Palpations: Abdomen is soft.      Tenderness: There is no guarding or rebound.   Musculoskeletal:         General: No deformity.      Cervical back: Neck supple.      Right lower leg: Edema present.      Left lower leg: Edema present.   Skin:     General: Skin is warm and dry.      Coloration: Skin is not jaundiced.      Findings: No rash.    Neurological:      Mental Status: He is alert and oriented to person, place, and time. Mental status is at baseline.          Significant Labs:   Microbiology Results (last 7 days)       Procedure Component Value Units Date/Time    Blood culture (site 1) [[id number]] Collected: 05/08/24 2205    Order Status: Completed Specimen: Blood from Peripheral, Antecubital, Right Updated: 05/09/24 0515     Blood Culture, Routine No Growth to date    Narrative:      Site # 1, aerobic and anaerobic    Blood culture (site 2) [[id number]] Collected: 05/08/24 2205    Order Status: Completed Specimen: Blood from Peripheral, Antecubital, Right Updated: 05/09/24 0515     Blood Culture, Routine No Growth to date    Narrative:      Site # 2, aerobic only    Culture, Respiratory with Gram Stain [[id number]]     Order Status: No result Specimen: Sputum, Expectorated           All pertinent labs within the past 24 hours have been reviewed.    Significant Imaging: I have reviewed all pertinent imaging results/findings within the past 24 hours.

## 2024-05-09 NOTE — PROCEDURES
"Oscar Waldron is a 64 y.o. male patient.    Temp: 98.6 °F (37 °C) (05/09/24 0732)  Pulse: 95 (05/09/24 1233)  Resp: (!) 22 (05/09/24 1233)  BP: (!) 142/87 (05/09/24 0854)  SpO2: 95 % (05/09/24 1233)  Weight: 69 kg (152 lb 1.9 oz) (05/09/24 0854)  Height: 5' 5" (165.1 cm) (05/09/24 0854)       Thoracentesis    Date/Time: 5/9/2024 1:16 PM  Location procedure was performed: [facility] PULMONARY MEDICINE    Performed by: Ellerman, Justin, MD  Authorized by: Ellerman, Justin, MD  Consent Done: Yes  Consent: Verbal consent obtained. Written consent obtained.  Risks and benefits: risks, benefits and alternatives were discussed  Consent given by: patient  Patient understanding: patient states understanding of the procedure being performed  Patient consent: the patient's understanding of the procedure matches consent given  Procedure consent: procedure consent matches procedure scheduled  Relevant documents: relevant documents present and verified  Test results: test results available and properly labeled  Site marked: the operative site was marked  Imaging studies: imaging studies available  Required items: required blood products, implants, devices, and special equipment available  Patient identity confirmed: verbally with patient  Time out: Immediately prior to procedure a "time out" was called to verify the correct patient, procedure, equipment, support staff and site/side marked as required.  Procedure purpose: diagnostic  Indications: pleural effusion  Preparation: Patient was prepped and draped in the usual sterile fashion.  Local anesthesia used: yes  Anesthesia: local infiltration    Anesthesia:  Local anesthesia used: yes  Local Anesthetic: lidocaine 1% without epinephrine  Anesthetic total: 10 mL  Preparation: skin prepped with ChloraPrep  Patient position: sitting  Ultrasound guidance: no  Location: left lateral  Intercostal space: 8th  Puncture method: over-the-needle catheter  Needle size: 18  Catheter size: 18 " gauge  Number of attempts: 1  Drainage amount: 950 ml  Drainage characteristics: serous  Patient tolerance: Patient tolerated the procedure well with no immediate complications  Chest x-ray performed: yes  Chest x-ray interpreted by me.  Chest x-ray findings: pleural effusion  Complications: No  Estimated blood loss (mL): 5  Specimens: Yes  Implants: No  Drainage Tube: removed        5/9/2024

## 2024-05-09 NOTE — PLAN OF CARE
[facility] Observation [facility]H  Initial Discharge Assessment       Primary Care Provider: No, Primary Doctor    Admission Diagnosis: Shortness of breath [R06.02]  SOB (shortness of breath) [R06.02]  Pleural effusion [J90]  Tachycardia [R00.0]  Hypoxia [R09.02]  Crack cocaine use [F14.90]  Tobacco use [Z72.0]  Chest pain [R07.9]    Admission Date: 5/8/2024  Expected Discharge Date: 5/9/2024  Sw met pt at bedside. Pt lives with his daughter and am    Transition of Care Barriers: (P) None    Payor: MEDICAID / Plan: [employer] / Product Type: Managed Medicaid /     Extended Emergency Contact Information  Primary Emergency Contact: Wilfredo Hopper  Mobile Phone: [phone]  Relation: Daughter  Preferred language: English   needed? No    Discharge Plan A: (P) Home with family  Discharge Plan B: (P) Home with family    No Pharmacies Listed    Initial Assessment (most recent)       Adult Discharge Assessment - 05/08/24 4092          Discharge Assessment    Assessment Type Discharge Planning Assessment (P)      Confirmed/corrected address, phone number and insurance Yes (P)      Confirmed Demographics Correct on Facesheet (P)      Source of Information patient (P)      Does patient/caregiver understand observation status Yes (P)      Communicated MITCH with patient/caregiver Date not available/Unable to determine (P)      People in Home child(chato), adult (P)      Do you expect to return to your current living situation? Yes (P)      Do you have help at home or someone to help you manage your care at home? Yes (P)      Who are your caregiver(s) and their phone number(s)? Wilfredo Hopper   [phone] (P)      Prior to hospitilization cognitive status: Unable to Assess (P)      Current cognitive status: Alert/Oriented (P)      Walking or Climbing Stairs Difficulty no (P)      Dressing/Bathing Difficulty no (P)      Home Layout Able to live on 1st floor (P)      Equipment Currently Used at Home none (P)       Readmission within 30 days? No (P)      Patient currently being followed by outpatient case management? No (P)      Do you currently have service(s) that help you manage your care at home? No (P)      Do you take prescription medications? Yes (P)      Do you have prescription coverage? Yes (P)      Do you have any problems affording any of your prescribed medications? No (P)      Is the patient taking medications as prescribed? yes (P)      Who is going to help you get home at discharge? pt's daughter (P)      How do you get to doctors appointments? car, drives self (P)      Are you on dialysis? No (P)      Do you take coumadin? No (P)      Discharge Plan A Home with family (P)      Discharge Plan B Home with family (P)      DME Needed Upon Discharge  none (P)      Discharge Plan discussed with: Patient (P)      Transition of Care Barriers None (P)         Physical Activity    On average, how many days per week do you engage in moderate to strenuous exercise (like a brisk walk)? 5 days (P)      On average, how many minutes do you engage in exercise at this level? 20 min (P)         Financial Resource Strain    How hard is it for you to pay for the very basics like food, housing, medical care, and heating? Not hard at all (P)         Housing Stability    In the last 12 months, was there a time when you were not able to pay the mortgage or rent on time? No (P)      At any time in the past 12 months, were you homeless or living in a shelter (including now)? No (P)         Transportation Needs    Has the lack of transportation kept you from medical appointments, meetings, work or from getting things needed for daily living? No (P)         Food Insecurity    Within the past 12 months, you worried that your food would run out before you got the money to buy more. Never true (P)      Within the past 12 months, the food you bought just didn't last and you didn't have money to get more. Never true (P)         Stress    Do  you feel stress - tense, restless, nervous, or anxious, or unable to sleep at night because your mind is troubled all the time - these days? Patient declined (P)         Social Isolation    How often do you feel lonely or isolated from those around you?  Patient declined (P)         Alcohol Use    Q1: How often do you have a drink containing alcohol? Never (P)      Q2: How many drinks containing alcohol do you have on a typical day when you are drinking? Patient does not drink (P)      Q3: How often do you have six or more drinks on one occasion? Never (P)         Utilities    In the past 12 months has the electric, gas, oil, or water company threatened to shut off services in your home? No (P)         Health Literacy    How often do you need to have someone help you when you read instructions, pamphlets, or other written material from your doctor or pharmacy? Patient declines to respond (P)

## 2024-05-09 NOTE — HPI
Oscar Waldron is a 64 y.o. male with a PMHx of cocaine abuse, HIV not on HAART, tobacco abuse, HTN who present to [facility] [place] of shortness of breath. Patient reports progressive worsening of shortness of breath over the past few weeks. He now has a  productive cough with associated left sided chest/ left upper abdomen pain which only occurs during coughing fits. Shortness of breath worsens while lying flat and with minimal exertion. No improvement with albuterol inhaler that he borrowed from a family member. Admits to smoking crack cocaine 2 days ago. Has >40 year hx of tobacco abuse. He stopped taking his HIV medications a few years ago.      ED: tachycardic to , improved without intervention. Satting 96% on 3L NC. BNP <10, trop 0.010. EKG with non-specifically TWAs. CXR with cardiomegaly with pulmonary vascular congestion and bilateral interstitial opacities, moderate left-sided pleural effusion with associated compressive atelectasis. Given solumedrol 125mg, azithro and rocephin.

## 2024-05-09 NOTE — H&P
[facility] - [facility] Dept  [facility] Medicine  History & Physical    Patient Name: Oscar Waldron  MRN: [id number]  Patient Class: OP- Observation  Admission Date: 5/8/2024  Attending Physician: Gail Araujo MD   Primary Care Provider: Kenna Primary Doctor         Patient information was obtained from patient, past medical records, and ER records.     Subjective:     Principal Problem:Acute respiratory failure with hypoxia    Chief Complaint:   Chief Complaint   Patient presents with    Shortness of Breath     Pt c/o SOB, left rib pain and cough x 1wk.  Onset after smoking crack.         HPI: Oscar Waldron is a 64 y.o. male with a PMHx of cocaine abuse, HIV not on HAART, tobacco abuse, HTN who [place] [facility] [place]ness [place] breath. Patient reports progressive worsening of shortness of breath over the past few weeks. He now has a  productive cough with associated left sided chest/ left upper abdomen pain which only occurs during coughing fits. Shortness of breath worsens while lying flat and with minimal exertion. No improvement with albuterol inhaler that he borrowed from a family member. Admits to smoking crack cocaine 2 days ago. Has >40 year hx of tobacco abuse. He stopped taking his HIV medications a few years ago.      ED: tachycardic to , improved without intervention. Satting 96% on 3L NC. BNP <10, trop 0.010. EKG with non-specifically TWAs. CXR with cardiomegaly with pulmonary vascular congestion and bilateral interstitial opacities, moderate left-sided pleural effusion with associated compressive atelectasis. Given solumedrol 125mg, azithro and rocephin.     Past medical history: HTN, HLD, HIV, tobacco abuse    No past surgical history on file.    Review of patient's allergies indicates:  Not on File    No current facility-administered medications on file prior to encounter.     No current outpatient medications on file prior to encounter.     Family History    None       Tobacco Use     Smoking status: Not on file    Smokeless tobacco: Not on file   Substance and Sexual Activity    Alcohol use: Not on file    Drug use: Not on file    Sexual activity: Not on file     Review of Systems   Constitutional:  Negative for activity change, chills and fever.   HENT:  Negative for trouble swallowing.    Eyes:  Negative for photophobia and visual disturbance.   Respiratory:  Positive for cough, chest tightness and shortness of breath. Negative for wheezing.    Cardiovascular:  Negative for chest pain, palpitations and leg swelling.   Gastrointestinal:  Negative for abdominal pain, constipation, diarrhea, nausea and vomiting.   Genitourinary:  Negative for dysuria, frequency, hematuria and urgency.   Musculoskeletal:  Negative for arthralgias, back pain and gait problem.   Skin:  Negative for color change and rash.   Neurological:  Negative for dizziness, syncope, weakness, light-headedness, numbness and headaches.   Psychiatric/Behavioral:  Negative for agitation and confusion. The patient is not nervous/anxious.      Objective:     Vital Signs (Most Recent):  Temp: 98.5 °F (36.9 °C) (05/08/24 2200)  Pulse: 103 (05/08/24 2200)  Resp: 15 (05/08/24 2200)  BP: (!) 143/75 (05/08/24 2200)  SpO2: 95 % (05/08/24 2200) Vital Signs (24h Range):  Temp:  [98.4 °F (36.9 °C)-98.7 °F (37.1 °C)] 98.5 °F (36.9 °C)  Pulse:  [] 103  Resp:  [15-22] 15  SpO2:  [95 %-99 %] 95 %  BP: (136-159)/(75-98) 143/75     Weight: 68 kg (150 lb)  Body mass index is 24.96 kg/m².     Physical Exam  Vitals and nursing note reviewed.   Constitutional:       General: He is not in acute distress.     Appearance: He is well-developed.   HENT:      Head: Normocephalic and atraumatic.      Mouth/Throat:      Pharynx: No oropharyngeal exudate.   Eyes:      General: No scleral icterus.     Conjunctiva/sclera: Conjunctivae normal.   Cardiovascular:      Rate and Rhythm: Normal rate and regular rhythm.      Heart sounds: Normal heart sounds.    Pulmonary:      Effort: Pulmonary effort is normal. No respiratory distress.      Breath sounds: Decreased air movement present. Rales present. No wheezing.   Abdominal:      General: Bowel sounds are normal. There is no distension.      Palpations: Abdomen is soft.      Tenderness: There is no abdominal tenderness.   Musculoskeletal:         General: No tenderness. Normal range of motion.      Cervical back: Normal range of motion and neck supple.      Comments: Trace edema to BLEs   Lymphadenopathy:      Cervical: No cervical adenopathy.   Skin:     General: Skin is warm and dry.      Capillary Refill: Capillary refill takes less than 2 seconds.      Findings: No rash.   Neurological:      Mental Status: He is alert and oriented to person, place, and time.      Cranial Nerves: No cranial nerve deficit.      Sensory: No sensory deficit.      Coordination: Coordination normal.   Psychiatric:         Behavior: Behavior normal.         Thought Content: Thought content normal.         Judgment: Judgment normal.                Significant Labs: All pertinent labs within the past 24 hours have been reviewed.  BMP:   Recent Labs   Lab 05/08/24  1805   GLU 90      K 4.3      CO2 23   BUN 10   CREATININE 0.8   CALCIUM 8.7     CBC:   Recent Labs   Lab 05/08/24  1805   WBC 9.66   HGB 15.9   HCT 50.2          Significant Imaging: I have reviewed all pertinent imaging results/findings within the past 24 hours.  X-Ray Chest AP Portable  Narrative: EXAMINATION:  XR CHEST AP PORTABLE    CLINICAL HISTORY:  CHF;    TECHNIQUE:  Single frontal view of the chest was performed.    COMPARISON:  None    FINDINGS:  Monitoring EKG leads are present.  The trachea is unremarkable.  There is obscuration of the left aspect of the cardiomediastinal silhouette.  There is no evidence of free air beneath the hemidiaphragms.  There is a moderate left-sided pleural effusion with associated compressive atelectasis.  There is no  evidence of a pneumothorax.  There is no evidence of pneumomediastinum.  There is pulmonary vascular congestion.  There are bilateral interstitial opacities.  There are degenerative changes in the osseous structures.  Impression: Cardiomegaly with pulmonary vascular congestion and bilateral interstitial opacities, suggestive of pulmonary edema secondary to CHF.    Moderate left-sided pleural effusion with associated compressive atelectasis.    Electronically signed by: Harry Spivey MD  Date:    05/08/2024  Time:    19:38      Assessment/Plan:     * Acute respiratory failure with hypoxia  - suspect has undiagnosed COPD with acute exacerbation, large L pleural effusion contributing to symptoms  - satting 96% on 3L NC  - s/p solumedrol 125mg IVP  - start prednisone 40mg daily  - continue rocephin/azithro  - follow sputum cx  - duonebs     Pleural effusion, left  - unclear cause- new onset CHF vs infection vs malignancy  - BNP <10  - trial lasix 20mg IVP  - CT chest non con ordered for further eval  - pulm consulted; appreciate recs    High cholesterol  - not on statin  - lipid panel in AM    HTN (hypertension)  - not on home meds  - start med if BP still uncontrolled s/p lasix-- may benefit from ACE/ARB if echo shows CHF    Human immunodeficiency virus (HIV) disease  - previously on Genvoya but quit taking years ago  - CD4 in AM  - ID consulted for HAART recs       VTE Risk Mitigation (From admission, onward)           Ordered     Place WILBER hose  Until discontinued         05/08/24 2112     IP VTE LOW RISK PATIENT  Once         05/08/24 2112                         On 05/08/2024, patient should be placed in hospital observation services under my care in collaboration with Dr. Ismael Hernández.           Flory Yarbrough PA-C  Department of Hospital Medicine  [facility] - Emergency Dept

## 2024-05-10 LAB
ACID FAST MOD KINY STN SPEC: NORMAL
ACID FAST MOD KINY STN SPEC: NORMAL
ANION GAP SERPL CALC-SCNC: 8 MMOL/L (ref 8–16)
BASOPHILS # BLD AUTO: 0.04 K/UL (ref 0–0.2)
BASOPHILS NFR BLD: 0.3 % (ref 0–1.9)
BUN SERPL-MCNC: 11 MG/DL (ref 8–23)
CALCIUM SERPL-MCNC: 9.1 MG/DL (ref 8.7–10.5)
CHLORIDE SERPL-SCNC: 106 MMOL/L (ref 95–110)
CO2 SERPL-SCNC: 26 MMOL/L (ref 23–29)
CREAT SERPL-MCNC: 0.7 MG/DL (ref 0.5–1.4)
DIFFERENTIAL METHOD BLD: ABNORMAL
EOSINOPHIL # BLD AUTO: 0.1 K/UL (ref 0–0.5)
EOSINOPHIL NFR BLD: 0.8 % (ref 0–8)
ERYTHROCYTE [DISTWIDTH] IN BLOOD BY AUTOMATED COUNT: 14.3 % (ref 11.5–14.5)
EST. GFR  (NO RACE VARIABLE): >60 ML/MIN/1.73 M^2
GLUCOSE SERPL-MCNC: 86 MG/DL (ref 70–110)
HCT VFR BLD AUTO: 48.8 % (ref 40–54)
HGB BLD-MCNC: 15.4 G/DL (ref 14–18)
HIV1 RNA # SERPL NAA+PROBE: <20 COPIES/ML
HIV1 RNA SERPL NAA+PROBE-LOG#: <1.3 LOG COPIES/ML
HIV1 RNA SERPL QL NAA+PROBE: DETECTED
IMM GRANULOCYTES # BLD AUTO: 0.1 K/UL (ref 0–0.04)
IMM GRANULOCYTES NFR BLD AUTO: 0.7 % (ref 0–0.5)
LYMPHOCYTES # BLD AUTO: 1.8 K/UL (ref 1–4.8)
LYMPHOCYTES NFR BLD: 12.1 % (ref 18–48)
MAGNESIUM SERPL-MCNC: 2.1 MG/DL (ref 1.6–2.6)
MCH RBC QN AUTO: 28.1 PG (ref 27–31)
MCHC RBC AUTO-ENTMCNC: 31.6 G/DL (ref 32–36)
MCV RBC AUTO: 89 FL (ref 82–98)
MONOCYTES # BLD AUTO: 1.2 K/UL (ref 0.3–1)
MONOCYTES NFR BLD: 8.1 % (ref 4–15)
MYCOBACTERIUM SPEC QL CULT: NORMAL
MYCOBACTERIUM SPEC QL CULT: NORMAL
NEUTROPHILS # BLD AUTO: 11.3 K/UL (ref 1.8–7.7)
NEUTROPHILS NFR BLD: 78 % (ref 38–73)
NRBC BLD-RTO: 0 /100 WBC
PHOSPHATE SERPL-MCNC: 3.8 MG/DL (ref 2.7–4.5)
PLATELET # BLD AUTO: 235 K/UL (ref 150–450)
PMV BLD AUTO: 8.9 FL (ref 9.2–12.9)
POTASSIUM SERPL-SCNC: 4.2 MMOL/L (ref 3.5–5.1)
RBC # BLD AUTO: 5.48 M/UL (ref 4.6–6.2)
SODIUM SERPL-SCNC: 140 MMOL/L (ref 136–145)
WBC # BLD AUTO: 14.51 K/UL (ref 3.9–12.7)

## 2024-05-10 PROCEDURE — 21400001 HC TELEMETRY ROOM

## 2024-05-10 PROCEDURE — 86403 PARTICLE AGGLUT ANTBDY SCRN: CPT | Performed by: STUDENT IN AN ORGANIZED HEALTH CARE EDUCATION/TRAINING PROGRAM

## 2024-05-10 PROCEDURE — 25000003 PHARM REV CODE 250

## 2024-05-10 PROCEDURE — 63700000 PHARM REV CODE 250 ALT 637 W/O HCPCS

## 2024-05-10 PROCEDURE — 36415 COLL VENOUS BLD VENIPUNCTURE: CPT | Performed by: STUDENT IN AN ORGANIZED HEALTH CARE EDUCATION/TRAINING PROGRAM

## 2024-05-10 PROCEDURE — 25500020 PHARM REV CODE 255: Performed by: HOSPITALIST

## 2024-05-10 PROCEDURE — 87449 NOS EACH ORGANISM AG IA: CPT | Performed by: STUDENT IN AN ORGANIZED HEALTH CARE EDUCATION/TRAINING PROGRAM

## 2024-05-10 PROCEDURE — 87015 SPECIMEN INFECT AGNT CONCNTJ: CPT | Performed by: HOSPITALIST

## 2024-05-10 PROCEDURE — 63600175 PHARM REV CODE 636 W HCPCS: Performed by: PHYSICIAN ASSISTANT

## 2024-05-10 PROCEDURE — 27000221 HC OXYGEN, UP TO 24 HOURS

## 2024-05-10 PROCEDURE — 80048 BASIC METABOLIC PNL TOTAL CA: CPT | Performed by: PHYSICIAN ASSISTANT

## 2024-05-10 PROCEDURE — 99900035 HC TECH TIME PER 15 MIN (STAT)

## 2024-05-10 PROCEDURE — 94640 AIRWAY INHALATION TREATMENT: CPT

## 2024-05-10 PROCEDURE — 25000242 PHARM REV CODE 250 ALT 637 W/ HCPCS

## 2024-05-10 PROCEDURE — 83735 ASSAY OF MAGNESIUM: CPT | Performed by: PHYSICIAN ASSISTANT

## 2024-05-10 PROCEDURE — 27000207 HC ISOLATION

## 2024-05-10 PROCEDURE — 86612 BLASTOMYCES ANTIBODY: CPT | Performed by: STUDENT IN AN ORGANIZED HEALTH CARE EDUCATION/TRAINING PROGRAM

## 2024-05-10 PROCEDURE — 99233 SBSQ HOSP IP/OBS HIGH 50: CPT | Mod: ,,, | Performed by: INTERNAL MEDICINE

## 2024-05-10 PROCEDURE — 25500020 PHARM REV CODE 255

## 2024-05-10 PROCEDURE — 84100 ASSAY OF PHOSPHORUS: CPT | Performed by: PHYSICIAN ASSISTANT

## 2024-05-10 PROCEDURE — 94761 N-INVAS EAR/PLS OXIMETRY MLT: CPT

## 2024-05-10 PROCEDURE — 87116 MYCOBACTERIA CULTURE: CPT | Performed by: HOSPITALIST

## 2024-05-10 PROCEDURE — 36415 COLL VENOUS BLD VENIPUNCTURE: CPT | Performed by: PHYSICIAN ASSISTANT

## 2024-05-10 PROCEDURE — 87206 SMEAR FLUORESCENT/ACID STAI: CPT | Performed by: HOSPITALIST

## 2024-05-10 PROCEDURE — 85025 COMPLETE CBC W/AUTO DIFF WBC: CPT | Performed by: PHYSICIAN ASSISTANT

## 2024-05-10 PROCEDURE — 63600175 PHARM REV CODE 636 W HCPCS

## 2024-05-10 RX ORDER — HYDROXYZINE HYDROCHLORIDE 10 MG/1
10 TABLET, FILM COATED ORAL 3 TIMES DAILY PRN
Status: DISCONTINUED | OUTPATIENT
Start: 2024-05-10 | End: 2024-05-25

## 2024-05-10 RX ADMIN — IOHEXOL 15 ML: 350 INJECTION, SOLUTION INTRAVENOUS at 12:05

## 2024-05-10 RX ADMIN — AZITHROMYCIN DIHYDRATE 250 MG: 250 TABLET ORAL at 08:05

## 2024-05-10 RX ADMIN — GUAIFENESIN 1200 MG: 600 TABLET, EXTENDED RELEASE ORAL at 08:05

## 2024-05-10 RX ADMIN — BENZONATATE 100 MG: 100 CAPSULE ORAL at 08:05

## 2024-05-10 RX ADMIN — IOHEXOL 75 ML: 350 INJECTION, SOLUTION INTRAVENOUS at 02:05

## 2024-05-10 RX ADMIN — IPRATROPIUM BROMIDE AND ALBUTEROL SULFATE 3 ML: 2.5; .5 SOLUTION RESPIRATORY (INHALATION) at 01:05

## 2024-05-10 RX ADMIN — BENZONATATE 100 MG: 100 CAPSULE ORAL at 09:05

## 2024-05-10 RX ADMIN — IPRATROPIUM BROMIDE AND ALBUTEROL SULFATE 3 ML: 2.5; .5 SOLUTION RESPIRATORY (INHALATION) at 04:05

## 2024-05-10 RX ADMIN — CEFTRIAXONE 2 G: 2 INJECTION, POWDER, FOR SOLUTION INTRAMUSCULAR; INTRAVENOUS at 08:05

## 2024-05-10 RX ADMIN — GUAIFENESIN 1200 MG: 600 TABLET, EXTENDED RELEASE ORAL at 10:05

## 2024-05-10 RX ADMIN — PREDNISONE 40 MG: 20 TABLET ORAL at 08:05

## 2024-05-10 RX ADMIN — IPRATROPIUM BROMIDE AND ALBUTEROL SULFATE 3 ML: 2.5; .5 SOLUTION RESPIRATORY (INHALATION) at 08:05

## 2024-05-10 RX ADMIN — BENZONATATE 100 MG: 100 CAPSULE ORAL at 02:05

## 2024-05-10 NOTE — SUBJECTIVE & OBJECTIVE
Interval History: NAEO. AFVSS. Feeling a bit better today after thoracentesis. Initial labs most consistent with simple parapneumonic effusion.      Objective:     Vital Signs (Most Recent):  Temp: 97.9 °F (36.6 °C) (05/10/24 0805)  Pulse: 81 (05/10/24 1110)  Resp: 18 (05/10/24 0900)  BP: (!) 142/77 (05/10/24 0805)  SpO2: 98 % (05/10/24 0900) Vital Signs (24h Range):  Temp:  [97.9 °F (36.6 °C)-99 °F (37.2 °C)] 97.9 °F (36.6 °C)  Pulse:  [] 81  Resp:  [16-22] 18  SpO2:  [92 %-99 %] 98 %  BP: (121-145)/(66-95) 142/77     Weight: 68.8 kg (151 lb 10.8 oz)  Body mass index is 25.24 kg/m².      Intake/Output Summary (Last 24 hours) at 5/10/2024 1212  Last data filed at 5/10/2024 1018  Gross per 24 hour   Intake --   Output 400 ml   Net -400 ml        Physical Exam  Vitals and nursing note reviewed.   Constitutional:       General: He is not in acute distress.     Appearance: He is ill-appearing (chronically). He is not toxic-appearing.   Cardiovascular:      Rate and Rhythm: Normal rate and regular rhythm.   Pulmonary:      Effort: Pulmonary effort is normal. No respiratory distress.      Breath sounds: Rhonchi and rales (improved) present. No wheezing.   Musculoskeletal:      Right lower leg: Edema present.      Left lower leg: Edema present.   Skin:     General: Skin is warm.   Neurological:      General: No focal deficit present.      Mental Status: He is alert. Mental status is at baseline.               Vents:  Oxygen Concentration (%): 32 (05/09/24 2027)    Lines/Drains/Airways       Peripheral Intravenous Line  Duration                  Peripheral IV - Single Lumen 05/08/24 18 G Right Wrist 2 days                    Significant Labs:    CBC/Anemia Profile:  Recent Labs   Lab 05/08/24  1805 05/09/24  0600 05/10/24  0436   WBC 9.66 12.10 14.51*   HGB 15.9 15.9 15.4   HCT 50.2 49.5 48.8    206 235   MCV 90 89 89   RDW 14.2 14.2 14.3        Chemistries:  Recent Labs   Lab 05/08/24  1805 05/09/24  0600  05/10/24  0436    141 140   K 4.3 4.4 4.2    110 106   CO2 23 21* 26   BUN 10 14 11   CREATININE 0.8 0.9 0.7   CALCIUM 8.7 9.3 9.1   ALBUMIN 2.9*  --   --    PROT 6.9  --   --    BILITOT 0.2  --   --    ALKPHOS 98  --   --    ALT 26  --   --    AST 27  --   --    MG  --  2.0 2.1   PHOS  --  1.5* 3.8       All pertinent labs within the past 24 hours have been reviewed.    Significant Imaging:  I have reviewed all pertinent imaging results/findings within the past 24 hours.

## 2024-05-10 NOTE — ASSESSMENT & PLAN NOTE
"Patient found to have moderate pleural effusion on imaging. I have personally reviewed and interpreted the following imaging: Xray, CT, and Ultrasound. A thoracentesis was performed. Pleural fluid was sent for analysis. Labs reviewed, including Serum LDH   LD   Date Value Ref Range Status   05/09/2024 281 (H) 110 - 260 U/L Final     Comment:     Results are increased in hemolyzed samples.   , Pleural Fluid LDH   LD, Fluid   Date Value Ref Range Status   05/09/2024 435 Not established U/L Final     Comment:     Reference intervals have not been established for body fluids.  Comparison of this result with the concentration in the blood,   serum,or plasma is recommended.  The reference interval for LDH in serum/plasma is   110-260 U/L. Please interpret in context with the clinical  picture.        , Serum Protein   Total Protein   Date Value Ref Range Status   05/08/2024 6.9 6.0 - 8.4 g/dL Final    , Pleural Protein No results found for: "PROTEINBODYF" , Cell Count and Differential No results found for: "BFCELLCNT" , Cytology No results found for: "CYTOFLUID" , and Fluid Cultures   Body Fluid Culture, Sterile   Date Value Ref Range Status   05/09/2024 No Growth to date  Preliminary    . Fluid most consistent with PENDING.  . Most likely etiology includes  PENDING . Management to include  PENDING FURTHER WORKUP    64M with HIV and pulmonary nodules admitted for acute hypoxic respiratory failure in the setting of pneumonia and left pleural effusion. CT chest concerning for RML nodule, SUZANNA nodule, LLL consolidation, left pleural effusion, emphysematous changes. Nodules appear spiculated and concerning for possible malignancy. Pulmonology consulted for evaluation and management of pleural effusion.    Evaluated left pleural effusion with bedside ultrasound. Adequate pocket identified. Patient consented and bedside thoracentesis performed. Sent off thoracentesis labs including cytology and GMS stain.    Initial " thoracentesis labs reveal positive Light's criteria. , glucose 115. Initial cultures negative. Most consistent with simple parapneumonic effusion.     RECOMMENDATIONS / PLAN:  -- Not a candidate for chest tube as effusion is most likely a simple parapneumonic effusion.  -- F/u cytology  -- F/u respiratory cultures  -- F/u AFB  -- Antimicrobials per ID

## 2024-05-10 NOTE — SUBJECTIVE & OBJECTIVE
Interval History: LA NENA. Whitish sputum with some productive cough ongoing, left sided rib pain, no fever, willing to get back on ART post discharge.     Review of Systems  Constitutional:  Positive for chills and fatigue. Negative for fever.   HENT:  Negative for trouble swallowing.    Respiratory:  Positive for cough and shortness of breath.    Gastrointestinal:  Negative for abdominal pain, blood in stool, diarrhea and vomiting.   Genitourinary:  Negative for dysuria and hematuria.   Musculoskeletal:  Negative for arthralgias, joint swelling and neck stiffness.   Neurological:  Negative for syncope.   Psychiatric/Behavioral:  Negative for confusion.    Objective:     Vital Signs (Most Recent):  Temp: 97.9 °F (36.6 °C) (05/10/24 0805)  Pulse: 87 (05/10/24 0900)  Resp: 18 (05/10/24 0900)  BP: (!) 142/77 (05/10/24 0805)  SpO2: 98 % (05/10/24 0900) Vital Signs (24h Range):  Temp:  [97.9 °F (36.6 °C)-99 °F (37.2 °C)] 97.9 °F (36.6 °C)  Pulse:  [] 87  Resp:  [16-22] 18  SpO2:  [92 %-99 %] 98 %  BP: (121-145)/(66-95) 142/77     Weight: 68.8 kg (151 lb 10.8 oz)  Body mass index is 25.24 kg/m².    Estimated Creatinine Clearance: 95.2 mL/min (based on SCr of 0.7 mg/dL).     Physical Exam   Constitutional:       Appearance: Normal appearance.   HENT:      Head: Normocephalic and atraumatic.      Nose: Nose normal.      Mouth/Throat:      Mouth: Mucous membranes are moist.      Pharynx: Oropharynx is clear.   Eyes:      Conjunctiva/sclera: Conjunctivae normal.   Cardiovascular:      Rate and Rhythm: Normal rate and regular rhythm.      Pulses: Normal pulses.      Heart sounds: Normal heart sounds.   Pulmonary:      Effort: Respiratory distress present.      Breath sounds: Rales present.      Comments: O2 support  Abdominal:      General: Bowel sounds are normal.      Palpations: Abdomen is soft.      Tenderness: There is no guarding or rebound.   Musculoskeletal:         General: No deformity.      Cervical back: Neck  supple.      Right lower leg: Edema present.      Left lower leg: Edema present.   Skin:     General: Skin is warm and dry.      Coloration: Skin is not jaundiced.      Findings: No rash.   Neurological:      Mental Status: He is alert and oriented to person, place, and time. Mental status is at baseline.   Significant Labs:   Microbiology Results (last 7 days)       Procedure Component Value Units Date/Time    AFB Culture & Smear [[id number]]     Order Status: No result Specimen: Sputum     Culture, Respiratory with Gram Stain [[id number]] Collected: 05/09/24 1117    Order Status: Completed Specimen: Sputum, Expectorated Updated: 05/10/24 0851     Respiratory Culture Normal respiratory av     Gram Stain (Respiratory) <10 epithelial cells per low power field.     Gram Stain (Respiratory) No WBC's     Gram Stain (Respiratory) Rare Gram negative rods    Blood culture [[id number]] Collected: 05/09/24 1745    Order Status: Completed Specimen: Blood from Peripheral, Antecubital, Left Updated: 05/10/24 0515     Blood Culture, Routine No Growth to date    Blood culture [[id number]] Collected: 05/09/24 1745    Order Status: Completed Specimen: Blood from Peripheral, Antecubital, Right Updated: 05/10/24 0515     Blood Culture, Routine No Growth to date    Culture, body fluid - Bactec [[id number]] Collected: 05/09/24 1211    Order Status: Completed Specimen: Body Fluid from Thoracentesis Fluid Updated: 05/10/24 0115     Body Fluid Culture, Sterile No Growth to date    AFB Culture & Smear [[id number]]     Order Status: Canceled Specimen: Respiratory from Sputum, Expectorated     Blood culture (site 2) [[id number]] Collected: 05/08/24 2205    Order Status: Completed Specimen: Blood from Peripheral, Antecubital, Right Updated: 05/09/24 2312     Blood Culture, Routine No Growth to date      No Growth to date    Narrative:      Site # 2, aerobic only    AFB stain [[id number]] Collected: 05/09/24 1212    Order Status: Completed  Specimen: Body Fluid from Pleural Fluid Updated: 05/09/24 1854     Direct Acid Fast No acid fast bacilli seen.    JORDAN prep [[id number]] Collected: 05/09/24 1212    Order Status: Completed Specimen: Body Fluid from Pleural Fluid Updated: 05/09/24 1828     KOH Prep No yeast or fungal elements seen    MRSA/SA Rapid ID by PCR from Blood culture [[id number]] Collected: 05/08/24 2205    Order Status: Completed Updated: 05/09/24 1812     Staph aureus ID by PCR Negative     Methicillin Resistant ID by PCR Negative    Narrative:      Site # 1, aerobic and anaerobic    Blood culture (site 1) [[id number]] Collected: 05/08/24 2205    Order Status: Completed Specimen: Blood from Peripheral, Antecubital, Right Updated: 05/09/24 1718     Blood Culture, Routine Gram stain aer bottle: Gram positive cocci in clusters resembling Staph      Results called to and read back by:Raven Dawson RN 05/09/2024  17:18    Narrative:      Site # 1, aerobic and anaerobic    Gram stain [[id number]] Collected: 05/09/24 1212    Order Status: Completed Specimen: Body Fluid from Pleural Fluid Updated: 05/09/24 1717     Gram Stain Result Rare WBC's      No organisms seen    AFB Culture & Smear [[id number]] Collected: 05/09/24 1644    Order Status: Sent Specimen: Respiratory from Sputum, Expectorated Updated: 05/09/24 1714    AFB Culture & Smear [[id number]] Collected: 05/09/24 1644    Order Status: Canceled Specimen: Respiratory from Sputum, Expectorated     AFB culture [[id number]] Collected: 05/09/24 1212    Order Status: Sent Specimen: Body Fluid from Pleural Fluid Updated: 05/09/24 1549    Fungus culture [[id number]] Collected: 05/09/24 1212    Order Status: Sent Specimen: Body Fluid from Pleural Fluid Updated: 05/09/24 1548    Aerobic culture [[id number]]     Order Status: Completed Specimen: Pleural Fluid     Culture, Anaerobe [[id number]]     Order Status: Completed Specimen: Body Fluid from Pleural Fluid     Culture, Anaerobe [[id number]]      Order Status: Canceled Specimen: Body Fluid from Pleural Fluid     Aerobic culture [[id number]]     Order Status: Canceled Specimen: Pleural Fluid           Recent Lab Results  (Last 5 results in the past 24 hours)        05/10/24  0436   05/09/24  1926   05/09/24  1745   05/09/24  1645   05/09/24  1558        Eos, Fluid               WBC, Body Fluid               Lymphs, Fluid               Segs, Fluid               Mesothelial Cells, Fluid               Body Fluid Type               Monocytes/Macrophages, Fluid               Pneumocystis Source       Sputum         Anion Gap 8               Baso # 0.04               Basophil % 0.3               Blood Culture, Routine     No Growth to date  [P]                No Growth to date  [P]           Body Fluid Source, Glucose               Body Fluid Source, LDH               Body Fluid Source, Total Protein               Body Fluid, Protein               BUN 11               Calcium 9.1               Chloride 106               CO2 26               Creatinine 0.7               Differential Method Automated               eGFR >60.0               Eos # 0.1               Eos % 0.8               Fluid Appearance               Fluid Color               Glucose 86               Glucose Body Fluid               Gran # (ANC) 11.3               Gran % 78.0               Hematocrit 48.8               Hemoglobin 15.4               Immature Grans (Abs) 0.10  Comment: Mild elevation in immature granulocytes is non specific and   can be seen in a variety of conditions including stress response,   acute inflammation, trauma and pregnancy. Correlation with other   laboratory and clinical findings is essential.                 Immature Granulocytes 0.7               LD, Fluid               Lymph # 1.8               Lymph % 12.1               Magnesium  2.1               MCH 28.1               MCHC 31.6               MCV 89               Mono # 1.2               Mono % 8.1               MPV  8.9               MTB Specimen Source       Sputum, Expectorated         nRBC 0               Phosphorus Level 3.8               Platelet Count 235               POCT Glucose   102       142       Potassium 4.2               RBC 5.48               RDW 14.3               Sodium 140               WBC 14.51                                       [P] - Preliminary Result               Significant Imaging: I have reviewed all pertinent imaging results/findings within the past 24 hours.

## 2024-05-10 NOTE — ASSESSMENT & PLAN NOTE
64 y.o. male with a PMHx of cocaine abuse, HIV not on HAART, tobacco abuse, HTN who [place] [facility] [place] at least two [place], endorsed crack cocaine use 2 days prior. Presentation notable for hypoxia, CT showed L apex spiculated nodule, RML GGO changes, multiple small b/l bnodules and large L effusion s/p thoracentesis by pulmonary at bedside, fluid studies and cultures pending. He remains afebrile, HDS. HIV labs pending, non adherence since 2020 reported.     Clinical picture suggests ddx of malignancy, bacterial pneumonia, tuberculosis or atypical mycobacterial infection, and less likely but possible fungal infection (halo sign noted by pulmonary), PJP less likely, cryptococcus as well remains possible.     Fungitell and crypto Ag added today. Pleural studies, serologies and cytology pending, so far differential does not favor typical bacterial organisms but mycobacteria and fungal organisms still possible. CD4 11%, 115, consistent with AIDS.     Recommendations    -Follow up pleural fluid aerobic culture, AFB, fungal culture, cytology and ADA  -MTB rule out: airborne precautions, AFB sputum x 3  by 8 hours with one early AM specimen, MTB PCR from sputum   -Follow up PJP PCR from sputum, if possible to obtain more samples  -Finish course of ceftriaxone and azithromycin for now.   -Patient mentioned he is willing to follow up with Dr. Vy Rosa at [facility] clinic for his HIV to start ART outpatient post discharge.

## 2024-05-10 NOTE — CARE UPDATE
"RAPID RESPONSE NURSE CHART REVIEW        Chart Reviewed: 05/10/2024, 12:15 PM    MRN: [id number]  Bed: 1126/1126 A    Dx: Acute respiratory failure with hypoxia    Oscar Waldron has no past medical history on file.    Last VS: BP (!) 142/77 (BP Location: Left arm, Patient Position: Lying)   Pulse 81   Temp 97.9 °F (36.6 °C) (Tympanic)   Resp 18   Ht 5' 5" (1.651 m)   Wt 68.8 kg (151 lb 10.8 oz)   SpO2 98%   BMI 25.24 kg/m²     24H Vital Sign Range:  Temp:  [97.9 °F (36.6 °C)-99 °F (37.2 °C)]   Pulse:  []   Resp:  [16-22]   BP: (121-145)/(66-95)   SpO2:  [92 %-99 %]     Level of Consciousness (AVPU): alert    Recent Labs     05/08/24  1805 05/09/24  0600 05/10/24  0436   WBC 9.66 12.10 14.51*   HGB 15.9 15.9 15.4   HCT 50.2 49.5 48.8    206 235       Recent Labs     05/08/24  1805 05/09/24  0600 05/10/24  0436    141 140   K 4.3 4.4 4.2    110 106   CO2 23 21* 26   BUN 10 14 11   CREATININE 0.8 0.9 0.7   GLU 90 138* 86   PHOS  --  1.5* 3.8   MG  --  2.0 2.1      OXYGEN:  Flow (L/min) (Oxygen Therapy): 3      MEWS score: 1    Rounding completed with charge IRMA Wright. Patient had thoracentesis yesterday. Stable on 3L NC. Plan to move to hosp med floor. No additional concerns verbalized at this time. Instructed to call [phone] for further concerns or assistance.    Gail Murray RN        "

## 2024-05-10 NOTE — PLAN OF CARE
Problem: Adult Inpatient Plan of Care  Goal: Plan of Care Review  Outcome: Progressing     Problem: Adult Inpatient Plan of Care  Goal: Absence of Hospital-Acquired Illness or Injury  Outcome: Progressing     Problem: Adult Inpatient Plan of Care  Goal: Optimal Comfort and Wellbeing  Outcome: Progressing     Problem: COPD (Chronic Obstructive Pulmonary Disease)  Goal: Optimal Chronic Illness Coping  Outcome: Progressing     Problem: COPD (Chronic Obstructive Pulmonary Disease)  Goal: Optimal Level of Functional [place]  Outcome: Progressing     Problem: COPD (Chronic Obstructive Pulmonary Disease)  Goal: Absence of Infection Signs and Symptoms  Outcome: Progressing     Problem: COPD (Chronic Obstructive Pulmonary Disease)  Goal: Improved Oral Intake  Outcome: Progressing

## 2024-05-10 NOTE — PROGRESS NOTES
[facility] Hospital Medicine  Progress Note    Primary Team: [facility] HOSP MED O  Admit Date: 5/8/2024    Subjective:      HPI:  Oscar Waldron is a 64 y.o. male with a PMHx of cocaine abuse, HIV not on HAART, tobacco abuse, HTN who present to [facility] for evaluation of shortness of breath. Patient reports progressive worsening of shortness of breath over the past few weeks. He now has a  productive cough with associated left sided chest/ left upper abdomen pain which only occurs during coughing fits. Shortness of breath worsens while lying flat and with minimal exertion. No improvement with albuterol inhaler that he borrowed from a family member. Admits to smoking crack cocaine 2 days ago. Has >40 year hx of tobacco abuse. He stopped taking his HIV medications a few years ago.       ED: tachycardic to , improved without intervention. Satting 96% on 3L NC. BNP <10, trop 0.010. EKG with non-specifically TWAs. CXR with cardiomegaly with pulmonary vascular congestion and bilateral interstitial opacities, moderate left-sided pleural effusion with associated compressive atelectasis. Given solumedrol 125mg, azithro and rocephin.      Overview/Hospital Course:  Oscar Waldron is placed in  Observation for management of acute respiratory failure with hypoxia. Requiring supplemental oxygen on admit. CXR reviewed. Received antibiotics and IV steroids in ED. Started on oral prednisone, continuing IV rocephin and azithromycin. CT chest reviewed, concern for lung cancer and metastatic process vs infectious process. Pulmonology and ID consulted. S/p thoracentesis for L pleural effusion 5/9 showing exudative process. Workup underway.      Interval History:  Reports some improvement of shortness of breath. Pleural studies from yesterday suggestive of exudative process. Workup ongoing.     ROS:  As per HPI  General: no fever, no chills, no weight loss, no fatigue  Cardiovascular: no chest pain, no  orthopnea  Respiratory:  no wheezes  All other systems reviewed & are negative.     No past medical history on file.  No past surgical history on file.      Objective:   Last 24 Hour Vital Signs:  BP  Min: 121/66  Max: 145/75  Temp  Av.4 °F (36.9 °C)  Min: 97.9 °F (36.6 °C)  Max: 99 °F (37.2 °C)  Pulse  Av.4  Min: 81  Max: 106  Resp  Av.5  Min: 16  Max: 22  SpO2  Av.1 %  Min: 92 %  Max: 99 %  Weight  Av.8 kg (151 lb 10.8 oz)  Min: 68.8 kg (151 lb 10.8 oz)  Max: 68.8 kg (151 lb 10.8 oz)  No intake/output data recorded.    Physical Examination:  GEN: AAOx3, NAD  HEENT: NCAT, MMM, PERRL, EOMI, oropharynx clear  CV: RRR, no m/r, no S3/S4  RESP: CTAB, no wheezes/crackles, no increased WOB, NC in place  ABD: soft, NTND, normoactive BS, no organomegaly  EXTR: no c/c/e, intact distal pulses x 4  NEURO: PERRL, EOMI, moving all four extremities, intact sensation to light touch, no focal deficits  SKIN: no rashes, lesions, or color changes  PSYCH: normal affect    Laboratory:  I have reviewed all pertinent lab results/findings within the past 24 hours.    Radiology:  I have reviewed all pertinent imaging results/findings within the past 24 hours.    Current Medications:     Infusions:       Scheduled:   albuterol-ipratropium  3 mL Nebulization Q4H WAKE    cefTRIAXone (Rocephin) IV (PEDS and ADULTS)  2 g Intravenous Q24H    And    azithromycin  250 mg Oral Daily    benzonatate  100 mg Oral TID    guaiFENesin  1,200 mg Oral BID    predniSONE  40 mg Oral Daily        PRN:    Current Facility-Administered Medications:     acetaminophen, 650 mg, Oral, Q4H PRN    bisacodyL, 10 mg, Rectal, Daily PRN    dextrose 10%, 12.5 g, Intravenous, PRN    dextrose 10%, 25 g, Intravenous, PRN    glucagon (human recombinant), 1 mg, Intramuscular, PRN    glucose, 16 g, Oral, PRN    glucose, 24 g, Oral, PRN    iohexol, 15 mL, Oral, PRN    melatonin, 6 mg, Oral, Nightly PRN    ondansetron, 8 mg, Oral, Q8H PRN    polyethylene  glycol, 17 g, Oral, Daily PRN    promethazine, 25 mg, Oral, Q6H PRN    Prior records reviewed.       Assessment/Plan:     Oscar Waldron is a 64 y.o.male with     Acute respiratory failure with hypoxia  Abnormal CT of lung  - suspect has undiagnosed COPD with acute exacerbation, large L pleural effusion contributing to symptoms  - satting 96% on 3L NC  - s/p solumedrol 125mg IVP; cont prednisone  - rocephin/azithro  - duonebs   - pulm & ID consulted  - sputum cx - normal respiratory av  - AFB smear negative x 1  - mTB PCR pending  - PJP PCP pending  - aspergillus antigen pending  - follow up pleural fluid studies, cytology, AFB culture & smear x 3 total  - airborne precautions     Hypophosphatemia  - phos 1.5, replaced  - daily phos     Pleural effusion, left  - unclear cause  - BNP <10  - s/p lasix 20mg IVP x 1  - CT chest reviewed  - pulm consulted, did thora with 950 ml removed; pleural fluid studies suggestive of exudative process  - AFB stain and KOH prep negative  - gram stain: no organisms seen  - follow up cytology, cultures     High cholesterol  - not on statin  - lipid panel reviewed     HTN (hypertension)  - not on home meds  - controlled currently     Human immunodeficiency virus (HIV) disease  AIDS  - non-adherent to ART; off since 2020  - CD4 115  - ID consulted for HAART carlotta Jacome MD  Hospital Medicine Staff  [facility]

## 2024-05-10 NOTE — PROGRESS NOTES
[facility]  Pulmonology  Progress Note    Patient Name: Oscar Waldron  MRN: [id number]  Admission Date: 5/8/2024  Hospital Length of Stay: 1 days  Code Status: Full Code  Attending Provider: Gail Jacome MD  Primary Care Provider: Kenna, Primary Doctor   Principal Problem: Acute respiratory failure with hypoxia    Subjective:     Interval History: NAEO. AFVSS. Feeling a bit better today after thoracentesis. Initial labs most consistent with simple parapneumonic effusion.      Objective:     Vital Signs (Most Recent):  Temp: 97.9 °F (36.6 °C) (05/10/24 0805)  Pulse: 81 (05/10/24 1110)  Resp: 18 (05/10/24 0900)  BP: (!) 142/77 (05/10/24 0805)  SpO2: 98 % (05/10/24 0900) Vital Signs (24h Range):  Temp:  [97.9 °F (36.6 °C)-99 °F (37.2 °C)] 97.9 °F (36.6 °C)  Pulse:  [] 81  Resp:  [16-22] 18  SpO2:  [92 %-99 %] 98 %  BP: (121-145)/(66-95) 142/77     Weight: 68.8 kg (151 lb 10.8 oz)  Body mass index is 25.24 kg/m².      Intake/Output Summary (Last 24 hours) at 5/10/2024 1212  Last data filed at 5/10/2024 1018  Gross per 24 hour   Intake --   Output 400 ml   Net -400 ml        Physical Exam  Vitals and nursing note reviewed.   Constitutional:       General: He is not in acute distress.     Appearance: He is ill-appearing (chronically). He is not toxic-appearing.   Cardiovascular:      Rate and Rhythm: Normal rate and regular rhythm.   Pulmonary:      Effort: Pulmonary effort is normal. No respiratory distress.      Breath sounds: Rhonchi and rales (improved) present. No wheezing.   Musculoskeletal:      Right lower leg: Edema present.      Left lower leg: Edema present.   Skin:     General: Skin is warm.   Neurological:      General: No focal deficit present.      Mental Status: He is alert. Mental status is at baseline.               Vents:  Oxygen Concentration (%): 32 (05/09/24 2027)    Lines/Drains/Airways       Peripheral Intravenous Line  Duration                  Peripheral IV - Single Lumen  "05/08/24 18 G Right Wrist 2 days                    Significant Labs:    CBC/Anemia Profile:  Recent Labs   Lab 05/08/24  1805 05/09/24  0600 05/10/24  0436   WBC 9.66 12.10 14.51*   HGB 15.9 15.9 15.4   HCT 50.2 49.5 48.8    206 235   MCV 90 89 89   RDW 14.2 14.2 14.3        Chemistries:  Recent Labs   Lab 05/08/24  1805 05/09/24  0600 05/10/24  0436    141 140   K 4.3 4.4 4.2    110 106   CO2 23 21* 26   BUN 10 14 11   CREATININE 0.8 0.9 0.7   CALCIUM 8.7 9.3 9.1   ALBUMIN 2.9*  --   --    PROT 6.9  --   --    BILITOT 0.2  --   --    ALKPHOS 98  --   --    ALT 26  --   --    AST 27  --   --    MG  --  2.0 2.1   PHOS  --  1.5* 3.8       All pertinent labs within the past 24 hours have been reviewed.    Significant Imaging:  I have reviewed all pertinent imaging results/findings within the past 24 hours.  Assessment/Plan:     Pulmonary  Pleural effusion, left  Patient found to have moderate pleural effusion on imaging. I have personally reviewed and interpreted the following imaging: Xray, CT, and Ultrasound. A thoracentesis was performed. Pleural fluid was sent for analysis. Labs reviewed, including Serum LDH   LD   Date Value Ref Range Status   05/09/2024 281 (H) 110 - 260 U/L Final     Comment:     Results are increased in hemolyzed samples.   , Pleural Fluid LDH   LD, Fluid   Date Value Ref Range Status   05/09/2024 435 Not established U/L Final     Comment:     Reference intervals have not been established for body fluids.  Comparison of this result with the concentration in the blood,   serum,or plasma is recommended.  The reference interval for LDH in serum/plasma is   110-260 U/L. Please interpret in context with the clinical  picture.        , Serum Protein   Total Protein   Date Value Ref Range Status   05/08/2024 6.9 6.0 - 8.4 g/dL Final    , Pleural Protein No results found for: "PROTEINBODYF" , Cell Count and Differential No results found for: "BFCELLCNT" , Cytology No results found " "for: "CYTOFLUID" , and Fluid Cultures   Body Fluid Culture, Sterile   Date Value Ref Range Status   05/09/2024 No Growth to date  Preliminary    . Fluid most consistent with PENDING.  . Most likely etiology includes  PENDING . Management to include  PENDING FURTHER WORKUP    64M with HIV and pulmonary nodules admitted for acute hypoxic respiratory failure in the setting of pneumonia and left pleural effusion. CT chest concerning for RML nodule, SUZANNA nodule, LLL consolidation, left pleural effusion, emphysematous changes. Nodules appear spiculated and concerning for possible malignancy. Pulmonology consulted for evaluation and management of pleural effusion.    Evaluated left pleural effusion with bedside ultrasound. Adequate pocket identified. Patient consented and bedside thoracentesis performed. Sent off thoracentesis labs including cytology and GMS stain.    Initial thoracentesis labs reveal positive Light's criteria. , glucose 115. Initial cultures negative. Most consistent with simple parapneumonic effusion.     RECOMMENDATIONS / PLAN:  -- Not a candidate for chest tube as effusion is most likely a simple parapneumonic effusion.  -- F/u cytology  -- F/u respiratory cultures  -- F/u AFB  -- Antimicrobials per ID        Thank you for your consult. Please contact us for any further questions.    Salomon Patrick MD  Pulmonology  [facility] - Observation 11H      "

## 2024-05-10 NOTE — PROGRESS NOTES
[facility] - Observation 11H  Infectious Disease  Progress Note    Patient Name: Oscar Waldron  MRN: [id number]  Admission Date: 5/8/2024  Length of Stay: 1 days  Attending Physician: Gail Jacome MD  Primary Care Provider: No, Primary Doctor    Isolation Status: Airborne  Assessment/Plan:      Pulmonary  * Acute respiratory failure with hypoxia  64 y.o. male with a PMHx of cocaine abuse, HIV not on HAART, tobacco abuse, HTN who present to [facility] for SOB of at least two weeks, endorsed crack cocaine use 2 days prior. Presentation notable for hypoxia, CT showed L apex spiculated nodule, RML GGO changes, multiple small b/l bnodules and large L effusion s/p thoracentesis by pulmonary at bedside, fluid studies and cultures pending. He remains afebrile, HDS. HIV labs pending, non adherence since 2020 reported.     Clinical picture suggests ddx of malignancy, bacterial pneumonia, tuberculosis or atypical mycobacterial infection, and less likely but possible fungal infection (halo sign noted by pulmonary), PJP less likely, cryptococcus as well remains possible.     Fungitell and crypto Ag added today. Pleural studies, serologies and cytology pending, so far differential does not favor typical bacterial organisms but mycobacteria and fungal organisms still possible. CD4 11%, 115, consistent with AIDS.     Recommendations    -Follow up pleural fluid aerobic culture, AFB, fungal culture, cytology and ADA  -MTB rule out: airborne precautions, AFB sputum x 3  by 8 hours with one early AM specimen, MTB PCR from sputum   -Follow up PJP PCR from sputum, if possible to obtain more samples  -Finish course of ceftriaxone and azithromycin for now.   -Patient mentioned he is willing to follow up with Dr. Vy Rosa at [facility] for his HIV to start ART outpatient post discharge.             Anticipated Disposition: TBD    Thank you for your consult. I will follow-up with patient. Please contact us if you have  any additional questions.    Luc Shaffer MD  Infectious Disease  [facility] - Observation 11H    Subjective:     Principal Problem:Acute respiratory failure with hypoxia    HPI: 64 y.o. male with a PMHx of cocaine abuse, HIV not on HAART, tobacco abuse, HTN who present to [facility] for progressively worsening SOB, endorsed crack cocaine use 2 days prior.     HIV hx: Chart documentation shows he was last seen by Vy Rosa MD back in 5/2020, VL undetectable 1/2019 with CD4 26% at that time, but high concern for non adherence, was missing 2-3 doses weekly while on Genvoya. He remains afebrile, no significant WBC change. Started on azithromycin and ceftriaxone, along with steroids. HDS. S/p thoracentesis by pulmonary at bedside, fluid studies and cultures pending.     CT chest showed a spiculated nodule L apex, GGO RML, and b/l irregular small nodules, mod to large left pleural effusion, worrisome for metastatic process. Patient tells us that he was doing okay prior to onset of symptoms but then suddenly developed acute onset shortness of breath with progressive worsening, some congestion, occasional productive cough which is dry most of the time but sometimes notable for brown sputum.  He denies hemoptysis.  Denies fevers, night sweats, rigors, but states he occasionally will have chills.  He denies sick contacts, exotic animals, exotic hobbies, or recent travel.  He states he has been off HIV medication since the COVID pandemic.  He denies prior intolerance to Genvoya and states he tolerated medication well.  He has a 40 pack-year smoking history and also admits to smoking crack pipe 2 days prior to admission.  He states that he quit tobacco a few weeks ago.  He denies diarrhea, recent trauma, new skin ulcers or lesions, pleuritic pain, prior oxygen dependence.  Denies antibiotic allergies.  Rai known exposure to persons with tuberculosis.ID consulted for HIV not on HAART.           Interval History: LA NENA.  Whitish sputum with some productive cough ongoing, left sided rib pain, no fever, willing to get back on ART post discharge.     Review of Systems  Constitutional:  Positive for chills and fatigue. Negative for fever.   HENT:  Negative for trouble swallowing.    Respiratory:  Positive for cough and shortness of breath.    Gastrointestinal:  Negative for abdominal pain, blood in stool, diarrhea and vomiting.   Genitourinary:  Negative for dysuria and hematuria.   Musculoskeletal:  Negative for arthralgias, joint swelling and neck stiffness.   Neurological:  Negative for syncope.   Psychiatric/Behavioral:  Negative for confusion.    Objective:     Vital Signs (Most Recent):  Temp: 97.9 °F (36.6 °C) (05/10/24 0805)  Pulse: 87 (05/10/24 0900)  Resp: 18 (05/10/24 0900)  BP: (!) 142/77 (05/10/24 0805)  SpO2: 98 % (05/10/24 0900) Vital Signs (24h Range):  Temp:  [97.9 °F (36.6 °C)-99 °F (37.2 °C)] 97.9 °F (36.6 °C)  Pulse:  [] 87  Resp:  [16-22] 18  SpO2:  [92 %-99 %] 98 %  BP: (121-145)/(66-95) 142/77     Weight: 68.8 kg (151 lb 10.8 oz)  Body mass index is 25.24 kg/m².    Estimated Creatinine Clearance: 95.2 mL/min (based on SCr of 0.7 mg/dL).     Physical Exam   Constitutional:       Appearance: Normal appearance.   HENT:      Head: Normocephalic and atraumatic.      Nose: Nose normal.      Mouth/Throat:      Mouth: Mucous membranes are moist.      Pharynx: Oropharynx is clear.   Eyes:      Conjunctiva/sclera: Conjunctivae normal.   Cardiovascular:      Rate and Rhythm: Normal rate and regular rhythm.      Pulses: Normal pulses.      Heart sounds: Normal heart sounds.   Pulmonary:      Effort: Respiratory distress present.      Breath sounds: Rales present.      Comments: O2 support  Abdominal:      General: Bowel sounds are normal.      Palpations: Abdomen is soft.      Tenderness: There is no guarding or rebound.   Musculoskeletal:         General: No deformity.      Cervical back: Neck supple.      Right lower  leg: Edema present.      Left lower leg: Edema present.   Skin:     General: Skin is warm and dry.      Coloration: Skin is not jaundiced.      Findings: No rash.   Neurological:      Mental Status: He is alert and oriented to person, place, and time. Mental status is at baseline.   Significant Labs:   Microbiology Results (last 7 days)       Procedure Component Value Units Date/Time    AFB Culture & Smear [[id number]]     Order Status: No result Specimen: Sputum     Culture, Respiratory with Gram Stain [[id number]] Collected: 05/09/24 1117    Order Status: Completed Specimen: Sputum, Expectorated Updated: 05/10/24 0851     Respiratory Culture Normal respiratory av     Gram Stain (Respiratory) <10 epithelial cells per low power field.     Gram Stain (Respiratory) No WBC's     Gram Stain (Respiratory) Rare Gram negative rods    Blood culture [[id number]] Collected: 05/09/24 1745    Order Status: Completed Specimen: Blood from Peripheral, Antecubital, Left Updated: 05/10/24 0515     Blood Culture, Routine No Growth to date    Blood culture [[id number]] Collected: 05/09/24 1745    Order Status: Completed Specimen: Blood from Peripheral, Antecubital, Right Updated: 05/10/24 0515     Blood Culture, Routine No Growth to date    Culture, body fluid - Bactec [[id number]] Collected: 05/09/24 1211    Order Status: Completed Specimen: Body Fluid from Thoracentesis Fluid Updated: 05/10/24 0115     Body Fluid Culture, Sterile No Growth to date    AFB Culture & Smear [[id number]]     Order Status: Canceled Specimen: Respiratory from Sputum, Expectorated     Blood culture (site 2) [[id number]] Collected: 05/08/24 2205    Order Status: Completed Specimen: Blood from Peripheral, Antecubital, Right Updated: 05/09/24 2312     Blood Culture, Routine No Growth to date      No Growth to date    Narrative:      Site # 2, aerobic only    AFB stain [[id number]] Collected: 05/09/24 1212    Order Status: Completed Specimen: Body Fluid from  Pleural Fluid Updated: 05/09/24 1854     Direct Acid Fast No acid fast bacilli seen.    JORDAN prep [[id number]] Collected: 05/09/24 1212    Order Status: Completed Specimen: Body Fluid from Pleural Fluid Updated: 05/09/24 1828     KOH Prep No yeast or fungal elements seen    MRSA/SA Rapid ID by PCR from Blood culture [[id number]] Collected: 05/08/24 2205    Order Status: Completed Updated: 05/09/24 1812     Staph aureus ID by PCR Negative     Methicillin Resistant ID by PCR Negative    Narrative:      Site # 1, aerobic and anaerobic    Blood culture (site 1) [[id number]] Collected: 05/08/24 2205    Order Status: Completed Specimen: Blood from Peripheral, Antecubital, Right Updated: 05/09/24 1718     Blood Culture, Routine Gram stain aer bottle: Gram positive cocci in clusters resembling Staph      Results called to and read back by:Raven Dawson RN 05/09/2024  17:18    Narrative:      Site # 1, aerobic and anaerobic    Gram stain [[id number]] Collected: 05/09/24 1212    Order Status: Completed Specimen: Body Fluid from Pleural Fluid Updated: 05/09/24 1717     Gram Stain Result Rare WBC's      No organisms seen    AFB Culture & Smear [[id number]] Collected: 05/09/24 1644    Order Status: Sent Specimen: Respiratory from Sputum, Expectorated Updated: 05/09/24 1714    AFB Culture & Smear [[id number]] Collected: 05/09/24 1644    Order Status: Canceled Specimen: Respiratory from Sputum, Expectorated     AFB culture [[id number]] Collected: 05/09/24 1212    Order Status: Sent Specimen: Body Fluid from Pleural Fluid Updated: 05/09/24 1549    Fungus culture [[id number]] Collected: 05/09/24 1212    Order Status: Sent Specimen: Body Fluid from Pleural Fluid Updated: 05/09/24 1548    Aerobic culture [[id number]]     Order Status: Completed Specimen: Pleural Fluid     Culture, Anaerobe [[id number]]     Order Status: Completed Specimen: Body Fluid from Pleural Fluid     Culture, Anaerobe [[id number]]     Order Status: Canceled  Specimen: Body Fluid from Pleural Fluid     Aerobic culture [[id number]]     Order Status: Canceled Specimen: Pleural Fluid           Recent Lab Results  (Last 5 results in the past 24 hours)        05/10/24  0436   05/09/24  1926   05/09/24  1745   05/09/24  1645   05/09/24  1558        Eos, Fluid               WBC, Body Fluid               Lymphs, Fluid               Segs, Fluid               Mesothelial Cells, Fluid               Body Fluid Type               Monocytes/Macrophages, Fluid               Pneumocystis Source       Sputum         Anion Gap 8               Baso # 0.04               Basophil % 0.3               Blood Culture, Routine     No Growth to date  [P]                No Growth to date  [P]           Body Fluid Source, Glucose               Body Fluid Source, LDH               Body Fluid Source, Total Protein               Body Fluid, Protein               BUN 11               Calcium 9.1               Chloride 106               CO2 26               Creatinine 0.7               Differential Method Automated               eGFR >60.0               Eos # 0.1               Eos % 0.8               Fluid Appearance               Fluid Color               Glucose 86               Glucose Body Fluid               Gran # (ANC) 11.3               Gran % 78.0               Hematocrit 48.8               Hemoglobin 15.4               Immature Grans (Abs) 0.10  Comment: Mild elevation in immature granulocytes is non specific and   can be seen in a variety of conditions including stress response,   acute inflammation, trauma and pregnancy. Correlation with other   laboratory and clinical findings is essential.                 Immature Granulocytes 0.7               LD, Fluid               Lymph # 1.8               Lymph % 12.1               Magnesium  2.1               MCH 28.1               MCHC 31.6               MCV 89               Mono # 1.2               Mono % 8.1               MPV 8.9               MTB  Specimen Source       Sputum, Expectorated         nRBC 0               Phosphorus Level 3.8               Platelet Count 235               POCT Glucose   102       142       Potassium 4.2               RBC 5.48               RDW 14.3               Sodium 140               WBC 14.51                                       [P] - Preliminary Result               Significant Imaging: I have reviewed all pertinent imaging results/findings within the past 24 hours.

## 2024-05-11 LAB
ANION GAP SERPL CALC-SCNC: 7 MMOL/L (ref 8–16)
BACTERIA BLD CULT: ABNORMAL
BACTERIA SPEC AEROBE CULT: NORMAL
BACTERIA SPEC AEROBE CULT: NORMAL
BASOPHILS # BLD AUTO: 0.04 K/UL (ref 0–0.2)
BASOPHILS NFR BLD: 0.3 % (ref 0–1.9)
BUN SERPL-MCNC: 12 MG/DL (ref 8–23)
CALCIUM SERPL-MCNC: 9.1 MG/DL (ref 8.7–10.5)
CHLORIDE SERPL-SCNC: 107 MMOL/L (ref 95–110)
CO2 SERPL-SCNC: 27 MMOL/L (ref 23–29)
CREAT SERPL-MCNC: 0.8 MG/DL (ref 0.5–1.4)
CRYPTOC AG SER QL LA: NEGATIVE
DIFFERENTIAL METHOD BLD: ABNORMAL
EOSINOPHIL # BLD AUTO: 0.2 K/UL (ref 0–0.5)
EOSINOPHIL NFR BLD: 1.2 % (ref 0–8)
ERYTHROCYTE [DISTWIDTH] IN BLOOD BY AUTOMATED COUNT: 14.4 % (ref 11.5–14.5)
EST. GFR  (NO RACE VARIABLE): >60 ML/MIN/1.73 M^2
GLUCOSE SERPL-MCNC: 85 MG/DL (ref 70–110)
GRAM STN SPEC: NORMAL
HCT VFR BLD AUTO: 48.7 % (ref 40–54)
HGB BLD-MCNC: 15.3 G/DL (ref 14–18)
IMM GRANULOCYTES # BLD AUTO: 0.07 K/UL (ref 0–0.04)
IMM GRANULOCYTES NFR BLD AUTO: 0.5 % (ref 0–0.5)
LYMPHOCYTES # BLD AUTO: 1.8 K/UL (ref 1–4.8)
LYMPHOCYTES NFR BLD: 13.5 % (ref 18–48)
MAGNESIUM SERPL-MCNC: 2.1 MG/DL (ref 1.6–2.6)
MCH RBC QN AUTO: 28.4 PG (ref 27–31)
MCHC RBC AUTO-ENTMCNC: 31.4 G/DL (ref 32–36)
MCV RBC AUTO: 91 FL (ref 82–98)
MONOCYTES # BLD AUTO: 1.3 K/UL (ref 0.3–1)
MONOCYTES NFR BLD: 9.3 % (ref 4–15)
NEUTROPHILS # BLD AUTO: 10.2 K/UL (ref 1.8–7.7)
NEUTROPHILS NFR BLD: 75.2 % (ref 38–73)
NRBC BLD-RTO: 0 /100 WBC
PHOSPHATE SERPL-MCNC: 3.5 MG/DL (ref 2.7–4.5)
PLATELET # BLD AUTO: 238 K/UL (ref 150–450)
PMV BLD AUTO: 8.9 FL (ref 9.2–12.9)
POTASSIUM SERPL-SCNC: 4.2 MMOL/L (ref 3.5–5.1)
RBC # BLD AUTO: 5.38 M/UL (ref 4.6–6.2)
SODIUM SERPL-SCNC: 141 MMOL/L (ref 136–145)
WBC # BLD AUTO: 13.6 K/UL (ref 3.9–12.7)

## 2024-05-11 PROCEDURE — 83735 ASSAY OF MAGNESIUM: CPT | Performed by: PHYSICIAN ASSISTANT

## 2024-05-11 PROCEDURE — 94640 AIRWAY INHALATION TREATMENT: CPT

## 2024-05-11 PROCEDURE — 63600175 PHARM REV CODE 636 W HCPCS: Performed by: PHYSICIAN ASSISTANT

## 2024-05-11 PROCEDURE — 63600175 PHARM REV CODE 636 W HCPCS

## 2024-05-11 PROCEDURE — 27000207 HC ISOLATION

## 2024-05-11 PROCEDURE — 99900026 HC AIRWAY MAINTENANCE (STAT)

## 2024-05-11 PROCEDURE — 25000003 PHARM REV CODE 250

## 2024-05-11 PROCEDURE — 25000242 PHARM REV CODE 250 ALT 637 W/ HCPCS

## 2024-05-11 PROCEDURE — 87116 MYCOBACTERIA CULTURE: CPT | Performed by: HOSPITALIST

## 2024-05-11 PROCEDURE — 36415 COLL VENOUS BLD VENIPUNCTURE: CPT | Performed by: PHYSICIAN ASSISTANT

## 2024-05-11 PROCEDURE — 84100 ASSAY OF PHOSPHORUS: CPT | Performed by: PHYSICIAN ASSISTANT

## 2024-05-11 PROCEDURE — 87015 SPECIMEN INFECT AGNT CONCNTJ: CPT | Performed by: HOSPITALIST

## 2024-05-11 PROCEDURE — 63700000 PHARM REV CODE 250 ALT 637 W/O HCPCS

## 2024-05-11 PROCEDURE — 94799 UNLISTED PULMONARY SVC/PX: CPT | Mod: XB

## 2024-05-11 PROCEDURE — 99900035 HC TECH TIME PER 15 MIN (STAT)

## 2024-05-11 PROCEDURE — 27000221 HC OXYGEN, UP TO 24 HOURS

## 2024-05-11 PROCEDURE — 85025 COMPLETE CBC W/AUTO DIFF WBC: CPT | Performed by: PHYSICIAN ASSISTANT

## 2024-05-11 PROCEDURE — 87102 FUNGUS ISOLATION CULTURE: CPT | Performed by: STUDENT IN AN ORGANIZED HEALTH CARE EDUCATION/TRAINING PROGRAM

## 2024-05-11 PROCEDURE — 80048 BASIC METABOLIC PNL TOTAL CA: CPT | Performed by: PHYSICIAN ASSISTANT

## 2024-05-11 PROCEDURE — 87206 SMEAR FLUORESCENT/ACID STAI: CPT | Performed by: HOSPITALIST

## 2024-05-11 PROCEDURE — 21400001 HC TELEMETRY ROOM

## 2024-05-11 PROCEDURE — 94761 N-INVAS EAR/PLS OXIMETRY MLT: CPT

## 2024-05-11 RX ORDER — IPRATROPIUM BROMIDE AND ALBUTEROL SULFATE 2.5; .5 MG/3ML; MG/3ML
3 SOLUTION RESPIRATORY (INHALATION) EVERY 6 HOURS PRN
Status: DISCONTINUED | OUTPATIENT
Start: 2024-05-11 | End: 2024-05-17

## 2024-05-11 RX ADMIN — IPRATROPIUM BROMIDE AND ALBUTEROL SULFATE 3 ML: 2.5; .5 SOLUTION RESPIRATORY (INHALATION) at 03:05

## 2024-05-11 RX ADMIN — GUAIFENESIN 1200 MG: 600 TABLET, EXTENDED RELEASE ORAL at 08:05

## 2024-05-11 RX ADMIN — IPRATROPIUM BROMIDE AND ALBUTEROL SULFATE 3 ML: 2.5; .5 SOLUTION RESPIRATORY (INHALATION) at 11:05

## 2024-05-11 RX ADMIN — BENZONATATE 100 MG: 100 CAPSULE ORAL at 09:05

## 2024-05-11 RX ADMIN — IPRATROPIUM BROMIDE AND ALBUTEROL SULFATE 3 ML: 2.5; .5 SOLUTION RESPIRATORY (INHALATION) at 07:05

## 2024-05-11 RX ADMIN — CEFTRIAXONE 2 G: 2 INJECTION, POWDER, FOR SOLUTION INTRAMUSCULAR; INTRAVENOUS at 10:05

## 2024-05-11 RX ADMIN — IPRATROPIUM BROMIDE AND ALBUTEROL SULFATE 3 ML: 2.5; .5 SOLUTION RESPIRATORY (INHALATION) at 04:05

## 2024-05-11 RX ADMIN — BENZONATATE 100 MG: 100 CAPSULE ORAL at 10:05

## 2024-05-11 RX ADMIN — PREDNISONE 40 MG: 20 TABLET ORAL at 10:05

## 2024-05-11 RX ADMIN — BENZONATATE 100 MG: 100 CAPSULE ORAL at 05:05

## 2024-05-11 RX ADMIN — GUAIFENESIN 1200 MG: 600 TABLET, EXTENDED RELEASE ORAL at 10:05

## 2024-05-11 RX ADMIN — AZITHROMYCIN DIHYDRATE 250 MG: 250 TABLET ORAL at 10:05

## 2024-05-11 NOTE — PROGRESS NOTES
[facility] Hospital Medicine  Progress Note    Primary Team: [facility] HOSP MED O  Admit Date: 5/8/2024    Subjective:      HPI:  Oscar Waldron is a 64 y.o. male with a PMHx of cocaine abuse, HIV not on HAART, tobacco abuse, HTN who present to [facility] for evaluation of shortness of breath. Patient reports progressive worsening of shortness of breath over the past few weeks. He now has a  productive cough with associated left sided chest/ left upper abdomen pain which only occurs during coughing fits. Shortness of breath worsens while lying flat and with minimal exertion. No improvement with albuterol inhaler that he borrowed from a family member. Admits to smoking crack cocaine 2 days ago. Has >40 year hx of tobacco abuse. He stopped taking his HIV medications a few years ago.       ED: tachycardic to , improved without intervention. Satting 96% on 3L NC. BNP <10, trop 0.010. EKG with non-specifically TWAs. CXR with cardiomegaly with pulmonary vascular congestion and bilateral interstitial opacities, moderate left-sided pleural effusion with associated compressive atelectasis. Given solumedrol 125mg, azithro and rocephin.      Overview/Hospital Course:  Oscar Waldron is placed in  Observation for management of acute respiratory failure with hypoxia. Requiring supplemental oxygen on admit. On IV rocephin and azithromycin. CT chest reviewed, concern for malignant/metastatic process vs infectious process. Pulmonology and ID consulted. S/p thoracentesis for L pleural effusion 5/9 with 950 mL removed & showing exudative process. Workup underway.      Interval History:  Reports ongoing cough productive of clear sputum. Workup in process. Await further ID & Pulmonology recs.      ROS:  As per HPI  General: no fever, no chills, no weight loss, no fatigue  Cardiovascular: no chest pain, no orthopnea  Respiratory:  no wheezes  All other systems reviewed & are negative.     No past medical history on file.  No  past surgical history on file.      Objective:   Last 24 Hour Vital Signs:  BP  Min: 123/69  Max: 176/89  Temp  Av.5 °F (36.9 °C)  Min: 98.2 °F (36.8 °C)  Max: 98.8 °F (37.1 °C)  Pulse  Av.6  Min: 69  Max: 92  Resp  Av  Min: 18  Max: 22  SpO2  Av.2 %  Min: 94 %  Max: 100 %  I/O last 3 completed shifts:  In: -   Out: 1150 [Urine:1150]    Physical Examination:  GEN: AAOx3, NAD  HEENT: NCAT, MMM, PERRL, EOMI, oropharynx clear  CV: RRR, no m/r, no S3/S4  RESP: CTAB, no wheezes/crackles, no increased WOB, NC in place  ABD: soft, NTND, normoactive BS, no organomegaly  EXTR: no c/c/e, intact distal pulses x 4  NEURO: PERRL, EOMI, moving all four extremities, intact sensation to light touch, no focal deficits  SKIN: no rashes, lesions, or color changes  PSYCH: normal affect    Laboratory:  I have reviewed all pertinent lab results/findings within the past 24 hours.    Radiology:  I have reviewed all pertinent imaging results/findings within the past 24 hours.    Current Medications:     Infusions:       Scheduled:   albuterol-ipratropium  3 mL Nebulization Q4H WAKE    cefTRIAXone (Rocephin) IV (PEDS and ADULTS)  2 g Intravenous Q24H    And    azithromycin  250 mg Oral Daily    benzonatate  100 mg Oral TID    guaiFENesin  1,200 mg Oral BID    predniSONE  40 mg Oral Daily        PRN:    Current Facility-Administered Medications:     acetaminophen, 650 mg, Oral, Q4H PRN    albuterol-ipratropium, 3 mL, Nebulization, Q6H PRN    bisacodyL, 10 mg, Rectal, Daily PRN    dextrose 10%, 12.5 g, Intravenous, PRN    dextrose 10%, 25 g, Intravenous, PRN    glucagon (human recombinant), 1 mg, Intramuscular, PRN    glucose, 16 g, Oral, PRN    glucose, 24 g, Oral, PRN    hydrOXYzine HCL, 10 mg, Oral, TID PRN    iohexol, 15 mL, Oral, PRN    melatonin, 6 mg, Oral, Nightly PRN    ondansetron, 8 mg, Oral, Q8H PRN    polyethylene glycol, 17 g, Oral, Daily PRN    promethazine, 25 mg, Oral, Q6H PRN    Prior records reviewed.        Assessment/Plan:     Oscar Waldron is a 64 y.o.male with     Acute respiratory failure with hypoxia  Abnormal CT of lung  - suspect has undiagnosed COPD with acute exacerbation, large L pleural effusion contributing to symptoms  - satting 96% on 3L NC  - s/p solumedrol 125mg IVP; cont prednisone  - rocephin/azithro  - duonebs   - pulm & ID consulted  - sputum cx - normal respiratory av  - mTB PCR pending  - PJP PCP pending  - aspergillus antigen pending  - fungitell pending  - crypto antigen pending  - AFB smear negative x 2; 3rd smear collected today. Follow AFB cultures  - follow up pleural fluid cultures, cytology,   - airborne precautions     Hypophosphatemia  - phos 1.5, replaced  - daily phos     Pleural effusion, left  - unclear cause  - BNP <10  - s/p lasix 20mg IVP x 1  - CT chest reviewed  - pulm consulted, did thora with 950 ml removed; pleural fluid studies suggestive of exudative process  - AFB stain and KOH prep negative  - gram stain: no organisms seen  - follow up cytology, cultures  - pulmonology considering repeat thora     High cholesterol  - not on statin  - lipid panel reviewed     HTN (hypertension)  - not on home meds  - controlled currently     Human immunodeficiency virus (HIV) disease  AIDS  - non-adherent to ART; off since 2020  - CD4 115  - ID consulted  - f/u with Dr Rosa at [facility] for ART      Gail Jacome MD  Hospital Medicine Staff  [facility]

## 2024-05-11 NOTE — PLAN OF CARE
64 M with AIDS and known non adherence to therapy for HIV, CD4 115, admitted for hypoxic respiratory failure with wide differential but leaning towards malignancy being high on the list.     Workup is ongoing to rule out TB (+risk factors on history), NTM, fungal etiology, as well as bacterial, pleural fluid cultures remain with no growth so far, AFBs pending. HIV undetectable somehow, but still has poor immune status and AIDS based on CD4 so risk of OI remains high.     Continue antibiotic therapy and follow up on pending workup     ID will continue to follow

## 2024-05-12 PROBLEM — R16.0 HYPODENSE MASS OF LIVER: Status: ACTIVE | Noted: 2024-05-12

## 2024-05-12 LAB
ADENOSINE DEAMINASE PLR-CCNC: 10 U/L (ref 0–30)
ANION GAP SERPL CALC-SCNC: 7 MMOL/L (ref 8–16)
BASOPHILS # BLD AUTO: 0.04 K/UL (ref 0–0.2)
BASOPHILS NFR BLD: 0.3 % (ref 0–1.9)
BUN SERPL-MCNC: 11 MG/DL (ref 8–23)
CALCIUM SERPL-MCNC: 9 MG/DL (ref 8.7–10.5)
CHLORIDE SERPL-SCNC: 108 MMOL/L (ref 95–110)
CO2 SERPL-SCNC: 25 MMOL/L (ref 23–29)
CREAT SERPL-MCNC: 0.7 MG/DL (ref 0.5–1.4)
DIFFERENTIAL METHOD BLD: ABNORMAL
EOSINOPHIL # BLD AUTO: 0.2 K/UL (ref 0–0.5)
EOSINOPHIL NFR BLD: 1 % (ref 0–8)
ERYTHROCYTE [DISTWIDTH] IN BLOOD BY AUTOMATED COUNT: 14.5 % (ref 11.5–14.5)
EST. GFR  (NO RACE VARIABLE): >60 ML/MIN/1.73 M^2
GLUCOSE SERPL-MCNC: 91 MG/DL (ref 70–110)
HCT VFR BLD AUTO: 51 % (ref 40–54)
HGB BLD-MCNC: 16 G/DL (ref 14–18)
IMM GRANULOCYTES # BLD AUTO: 0.1 K/UL (ref 0–0.04)
IMM GRANULOCYTES NFR BLD AUTO: 0.7 % (ref 0–0.5)
LYMPHOCYTES # BLD AUTO: 2.1 K/UL (ref 1–4.8)
LYMPHOCYTES NFR BLD: 13.9 % (ref 18–48)
MAGNESIUM SERPL-MCNC: 2.2 MG/DL (ref 1.6–2.6)
MCH RBC QN AUTO: 28.1 PG (ref 27–31)
MCHC RBC AUTO-ENTMCNC: 31.4 G/DL (ref 32–36)
MCV RBC AUTO: 90 FL (ref 82–98)
MONOCYTES # BLD AUTO: 1.5 K/UL (ref 0.3–1)
MONOCYTES NFR BLD: 9.9 % (ref 4–15)
NEUTROPHILS # BLD AUTO: 11 K/UL (ref 1.8–7.7)
NEUTROPHILS NFR BLD: 74.2 % (ref 38–73)
NRBC BLD-RTO: 0 /100 WBC
PHOSPHATE SERPL-MCNC: 3.1 MG/DL (ref 2.7–4.5)
PLATELET # BLD AUTO: 203 K/UL (ref 150–450)
PMV BLD AUTO: 8.3 FL (ref 9.2–12.9)
POTASSIUM SERPL-SCNC: 4.4 MMOL/L (ref 3.5–5.1)
RBC # BLD AUTO: 5.69 M/UL (ref 4.6–6.2)
SODIUM SERPL-SCNC: 140 MMOL/L (ref 136–145)
WBC # BLD AUTO: 14.78 K/UL (ref 3.9–12.7)

## 2024-05-12 PROCEDURE — 27000207 HC ISOLATION

## 2024-05-12 PROCEDURE — 99900035 HC TECH TIME PER 15 MIN (STAT)

## 2024-05-12 PROCEDURE — 84100 ASSAY OF PHOSPHORUS: CPT | Performed by: PHYSICIAN ASSISTANT

## 2024-05-12 PROCEDURE — 83735 ASSAY OF MAGNESIUM: CPT | Performed by: PHYSICIAN ASSISTANT

## 2024-05-12 PROCEDURE — 85025 COMPLETE CBC W/AUTO DIFF WBC: CPT | Performed by: PHYSICIAN ASSISTANT

## 2024-05-12 PROCEDURE — 25000003 PHARM REV CODE 250: Performed by: PHYSICIAN ASSISTANT

## 2024-05-12 PROCEDURE — 94640 AIRWAY INHALATION TREATMENT: CPT

## 2024-05-12 PROCEDURE — 27000221 HC OXYGEN, UP TO 24 HOURS

## 2024-05-12 PROCEDURE — 25000242 PHARM REV CODE 250 ALT 637 W/ HCPCS: Performed by: HOSPITALIST

## 2024-05-12 PROCEDURE — 63600175 PHARM REV CODE 636 W HCPCS

## 2024-05-12 PROCEDURE — 25000242 PHARM REV CODE 250 ALT 637 W/ HCPCS

## 2024-05-12 PROCEDURE — 94664 DEMO&/EVAL PT USE INHALER: CPT

## 2024-05-12 PROCEDURE — 63700000 PHARM REV CODE 250 ALT 637 W/O HCPCS

## 2024-05-12 PROCEDURE — 94761 N-INVAS EAR/PLS OXIMETRY MLT: CPT

## 2024-05-12 PROCEDURE — 94667 MNPJ CHEST WALL 1ST: CPT

## 2024-05-12 PROCEDURE — 21400001 HC TELEMETRY ROOM

## 2024-05-12 PROCEDURE — 99233 SBSQ HOSP IP/OBS HIGH 50: CPT | Mod: ,,, | Performed by: INTERNAL MEDICINE

## 2024-05-12 PROCEDURE — 25000003 PHARM REV CODE 250: Performed by: HOSPITALIST

## 2024-05-12 PROCEDURE — 36415 COLL VENOUS BLD VENIPUNCTURE: CPT | Performed by: PHYSICIAN ASSISTANT

## 2024-05-12 PROCEDURE — 27000646 HC AEROBIKA DEVICE

## 2024-05-12 PROCEDURE — 25000003 PHARM REV CODE 250

## 2024-05-12 PROCEDURE — 80048 BASIC METABOLIC PNL TOTAL CA: CPT | Performed by: PHYSICIAN ASSISTANT

## 2024-05-12 RX ORDER — ACETYLCYSTEINE 100 MG/ML
4 SOLUTION ORAL; RESPIRATORY (INHALATION)
Status: DISCONTINUED | OUTPATIENT
Start: 2024-05-12 | End: 2024-05-12

## 2024-05-12 RX ORDER — SODIUM CHLORIDE FOR INHALATION 3 %
4 VIAL, NEBULIZER (ML) INHALATION EVERY 8 HOURS
Status: DISCONTINUED | OUTPATIENT
Start: 2024-05-12 | End: 2024-05-26

## 2024-05-12 RX ADMIN — GUAIFENESIN 1200 MG: 600 TABLET, EXTENDED RELEASE ORAL at 09:05

## 2024-05-12 RX ADMIN — IPRATROPIUM BROMIDE AND ALBUTEROL SULFATE 3 ML: 2.5; .5 SOLUTION RESPIRATORY (INHALATION) at 02:05

## 2024-05-12 RX ADMIN — AZITHROMYCIN DIHYDRATE 250 MG: 250 TABLET ORAL at 09:05

## 2024-05-12 RX ADMIN — SODIUM CHLORIDE SOLN NEBU 3% 4 ML: 3 NEBU SOLN at 11:05

## 2024-05-12 RX ADMIN — ACETYLCYSTEINE 4 ML: 100 INHALANT RESPIRATORY (INHALATION) at 02:05

## 2024-05-12 RX ADMIN — ACETAMINOPHEN 650 MG: 325 TABLET ORAL at 02:05

## 2024-05-12 RX ADMIN — IPRATROPIUM BROMIDE AND ALBUTEROL SULFATE 3 ML: 2.5; .5 SOLUTION RESPIRATORY (INHALATION) at 04:05

## 2024-05-12 RX ADMIN — BENZONATATE 100 MG: 100 CAPSULE ORAL at 09:05

## 2024-05-12 RX ADMIN — IPRATROPIUM BROMIDE AND ALBUTEROL SULFATE 3 ML: 2.5; .5 SOLUTION RESPIRATORY (INHALATION) at 08:05

## 2024-05-12 RX ADMIN — HYDROXYZINE HYDROCHLORIDE 10 MG: 10 TABLET ORAL at 11:05

## 2024-05-12 RX ADMIN — CEFTRIAXONE 2 G: 2 INJECTION, POWDER, FOR SOLUTION INTRAMUSCULAR; INTRAVENOUS at 09:05

## 2024-05-12 RX ADMIN — MELATONIN TAB 3 MG 6 MG: 3 TAB at 11:05

## 2024-05-12 NOTE — ASSESSMENT & PLAN NOTE
AIDS secondary to CD4 <200. Not on ART.  Patient will need to re-establish care if interested in resuming ART.

## 2024-05-12 NOTE — PLAN OF CARE
Problem: Adult Inpatient Plan of Care  Goal: Plan of Care Review  Outcome: Progressing  Goal: Patient-Specific Goal (Individualized)  Outcome: Progressing  Goal: Absence of Hospital-Acquired Illness or Injury  Outcome: Progressing  Goal: Optimal Comfort and Wellbeing  Outcome: Progressing     Problem: COPD (Chronic Obstructive Pulmonary Disease)  Goal: Optimal Chronic Illness Coping  Outcome: Progressing  Goal: Optimal Level of Functional [place]  Outcome: Progressing  Goal: Absence of Infection Signs and Symptoms  Outcome: Progressing  Goal: Improved Oral Intake  Outcome: Progressing  Goal: Effective Oxygenation and Ventilation  Outcome: Progressing     Problem: Infection  Goal: Absence of Infection Signs and Symptoms  Outcome: Progressing

## 2024-05-12 NOTE — ASSESSMENT & PLAN NOTE
Multiple hypodense lesions seen on CT imaging.   Consider hepatology consultation for further work up.

## 2024-05-12 NOTE — PROGRESS NOTES
Pt resting with eyes closed but easy to arouse.  No distress noted. Bed in lowest position. Side rails up x2.  Call bell and personal belongings within reach.  Safety precautions maintained.  Pt free of falls or injuries.  No further issues or concerns at  this time.  Plan of care continues.

## 2024-05-12 NOTE — PLAN OF CARE
Problem: Adult Inpatient Plan of Care  Goal: Plan of Care Review  Outcome: Progressing  Goal: Patient-Specific Goal (Individualized)  Outcome: Progressing  Goal: Absence of Hospital-Acquired Illness or Injury  Outcome: Progressing  Goal: Optimal Comfort and Wellbeing  Outcome: Progressing  Goal: Readiness for Transition of Care  Outcome: Progressing     Problem: COPD (Chronic Obstructive Pulmonary Disease)  Goal: Optimal Chronic Illness Coping  Outcome: Progressing  Goal: Optimal Level of Functional [place]  Outcome: Progressing  Goal: Absence of Infection Signs and Symptoms  Outcome: Progressing  Goal: Improved Oral Intake  Outcome: Progressing  Goal: Effective Oxygenation and Ventilation  Outcome: Progressing     Problem: Infection  Goal: Absence of Infection Signs and Symptoms  Outcome: Progressing

## 2024-05-12 NOTE — ASSESSMENT & PLAN NOTE
64 y.o. male with a PMHx of cocaine abuse, HIV not on HAART, tobacco abuse, HTN who present [place] [facility] [place]B of at least two weeks, endorsed crack cocaine use 2 days prior. Presentation notable for hypoxia, CT showed L apex spiculated nodule, RML GGO changes, multiple small b/l bnodules and large L effusion s/p thoracentesis by pulmonary at bedside, fluid studies and cultures pending. He remains afebrile, HDS. HIV labs pending, non adherence since 2020 reported.     Clinical picture suggests ddx of malignancy, bacterial pneumonia, tuberculosis or atypical mycobacterial infection, and less likely but possible fungal infection (halo sign noted by pulmonary), PJP less likely, cryptococcus as well remains possible.     Fungitell and crypto Ag added today. Pleural studies, serologies and cytology pending, so far differential does not favor typical bacterial organisms but mycobacteria and fungal organisms still possible. CD4 11%, 115, consistent with AIDS.     Recommendations    -Follow up pleural fluid aerobic culture, AFB, fungal culture, cytology and ADA  -MTB rule out: airborne precautions, AFB sputum x 3  by 8 hours with one early AM specimen, MTB PCR from sputum   -Follow up PJP PCR from sputum, if possible to obtain more samples  -Finish course of ceftriaxone and azithromycin for now.   -Patient mentioned he is willing to follow up with Dr. Vy Rosa at [facility] clinic for his HIV to start ART outpatient post discharge.

## 2024-05-12 NOTE — SUBJECTIVE & OBJECTIVE
"Interval History: "Still coughing". No acute overnight events.     Review of Systems   Constitutional:  Negative for chills, fatigue and fever.   HENT:  Negative for ear pain, mouth sores, nosebleeds, postnasal drip, rhinorrhea, sinus pressure, sore throat, tinnitus, trouble swallowing and voice change.    Eyes:  Negative for photophobia, pain, redness and visual disturbance.   Respiratory:  Positive for cough and shortness of breath. Negative for apnea, chest tightness and wheezing.    Cardiovascular:  Negative for chest pain, palpitations and leg swelling.   Gastrointestinal:  Negative for abdominal pain, blood in stool, constipation, diarrhea, nausea and vomiting.   Endocrine: Negative for cold intolerance, heat intolerance, polydipsia and polyuria.   Genitourinary:  Negative for decreased urine volume, difficulty urinating, dysuria, flank pain, frequency, genital sores, hematuria, penile discharge, penile pain, penile swelling, scrotal swelling, testicular pain and urgency.   Musculoskeletal:  Negative for arthralgias, back pain, joint swelling, myalgias and neck pain.   Skin:  Negative for color change and rash.   Allergic/Immunologic: Negative for environmental allergies and food allergies.   Neurological:  Negative for dizziness, seizures, syncope, weakness, light-headedness, numbness and headaches.   Hematological:  Negative for adenopathy. Does not bruise/bleed easily.   Psychiatric/Behavioral:  Negative for agitation, confusion, decreased concentration, hallucinations, self-injury, sleep disturbance and suicidal ideas. The patient is not nervous/anxious.      Objective:     Vital Signs (Most Recent):  Temp: 98.2 °F (36.8 °C) (05/12/24 0748)  Pulse: 88 (05/12/24 1053)  Resp: 18 (05/12/24 0812)  BP: (!) 160/95 (05/12/24 0748)  SpO2: 95 % (05/12/24 0812) Vital Signs (24h Range):  Temp:  [97.4 °F (36.3 °C)-98.6 °F (37 °C)] 98.2 °F (36.8 °C)  Pulse:  [73-88] 88  Resp:  [18-20] 18  SpO2:  [91 %-98 %] 95 %  BP: " (139-168)/(81-95) 160/95     Weight: 68.8 kg (151 lb 10.8 oz)  Body mass index is 25.24 kg/m².    Estimated Creatinine Clearance: 95.2 mL/min (based on SCr of 0.7 mg/dL).     Physical Exam  Vitals reviewed.   Constitutional:       General: He is sleeping.      Appearance: He is well-developed.   HENT:      Head: Normocephalic and atraumatic.      Right Ear: External ear normal.      Left Ear: External ear normal.   Eyes:      Conjunctiva/sclera: Conjunctivae normal.   Neck:      Thyroid: No thyromegaly.   Cardiovascular:      Rate and Rhythm: Normal rate and regular rhythm.      Heart sounds: Normal heart sounds. No murmur heard.  Pulmonary:      Effort: Pulmonary effort is normal.      Breath sounds: Examination of the left-upper field reveals rales. Examination of the left-middle field reveals rales. Examination of the left-lower field reveals decreased breath sounds. Decreased breath sounds and rales present. No wheezing.   Abdominal:      General: Bowel sounds are normal.      Palpations: Abdomen is soft. There is no mass.      Tenderness: There is no abdominal tenderness. There is no rebound.   Musculoskeletal:         General: Normal range of motion.      Cervical back: Normal range of motion and neck supple.   Lymphadenopathy:      Cervical: No cervical adenopathy.   Skin:     General: Skin is warm and dry.   Neurological:      Mental Status: He is oriented to person, place, and time and easily aroused.   Psychiatric:         Behavior: Behavior normal.          Significant Labs: CBC:   Recent Labs   Lab 05/11/24  0651 05/12/24  0541   WBC 13.60* 14.78*   HGB 15.3 16.0   HCT 48.7 51.0    203     CMP:   Recent Labs   Lab 05/11/24  0651 05/12/24  0541    140   K 4.2 4.4    108   CO2 27 25   GLU 85 91   BUN 12 11   CREATININE 0.8 0.7   CALCIUM 9.1 9.0   ANIONGAP 7* 7*     Microbiology Results (last 7 days)       Procedure Component Value Units Date/Time    AFB Culture & Smear [[id number]]  Collected: 05/11/24 0451    Order Status: Completed Specimen: Sputum Updated: 05/12/24 0927     AFB Culture & Smear Culture in progress    Blood culture (site 2) [[id number]] Collected: 05/08/24 2205    Order Status: Completed Specimen: Blood from Peripheral, Antecubital, Right Updated: 05/11/24 2312     Blood Culture, Routine No Growth to date      No Growth to date      No Growth to date      No Growth to date    Narrative:      Site # 2, aerobic only    Blood culture [[id number]] Collected: 05/09/24 1745    Order Status: Completed Specimen: Blood from Peripheral, Antecubital, Left Updated: 05/11/24 2222     Blood Culture, Routine No Growth to date      No Growth to date      No Growth to date    Blood culture [[id number]] Collected: 05/09/24 1745    Order Status: Completed Specimen: Blood from Peripheral, Antecubital, Right Updated: 05/11/24 2222     Blood Culture, Routine No Growth to date      No Growth to date      No Growth to date    AFB Culture & Smear [[id number]] Collected: 05/10/24 0940    Order Status: Completed Specimen: Sputum Updated: 05/11/24 2127     AFB Culture & Smear Culture in progress    Culture, body fluid - Bactec [[id number]] Collected: 05/09/24 1211    Order Status: Completed Specimen: Body Fluid from Thoracentesis Fluid Updated: 05/11/24 1812     Body Fluid Culture, Sterile No Growth to date      No Growth to date      No Growth to date    Cryptococcal antigen [[id number]] Collected: 05/10/24 1406    Order Status: Completed Specimen: Blood, Venous Updated: 05/11/24 1247     Cryptococcal Ag, Blood Negative    Blood culture (site 1) [[id number]]  (Abnormal) Collected: 05/08/24 2205    Order Status: Completed Specimen: Blood from Peripheral, Antecubital, Right Updated: 05/11/24 1103     Blood Culture, Routine Gram stain aer bottle: Gram positive cocci in clusters resembling Staph      Results called to and read back by:Raven Dawson RN 05/09/2024  17:18      COAGULASE-NEGATIVE STAPHYLOCOCCUS  SPECIES  Organism is a probable contaminant      Narrative:      Site # 1, aerobic and anaerobic    Culture, Respiratory with Gram Stain [[id number]] Collected: 05/09/24 1117    Order Status: Completed Specimen: Sputum, Expectorated Updated: 05/11/24 0938     Respiratory Culture Normal respiratory av      No S aureus or Pseudomonas isolated.     Gram Stain (Respiratory) <10 epithelial cells per low power field.     Gram Stain (Respiratory) No WBC's     Gram Stain (Respiratory) Rare Gram negative rods    Fungus culture [[id number]] Collected: 05/11/24 0451    Order Status: Sent Specimen: Sputum Updated: 05/11/24 0517    AFB culture [[id number]] Collected: 05/09/24 1212    Order Status: Completed Specimen: Body Fluid from Pleural Fluid Updated: 05/10/24 2127     AFB Culture & Smear Culture in progress     AFB CULTURE STAIN No acid fast bacilli seen.    AFB Culture & Smear [[id number]] Collected: 05/09/24 1644    Order Status: Completed Specimen: Respiratory from Sputum, Expectorated Updated: 05/10/24 2127     AFB Culture & Smear Culture in progress     AFB CULTURE STAIN No acid fast bacilli seen.    AFB Culture & Smear [[id number]]     Order Status: Canceled Specimen: Respiratory from Sputum, Expectorated     AFB stain [[id number]] Collected: 05/09/24 1212    Order Status: Completed Specimen: Body Fluid from Pleural Fluid Updated: 05/09/24 1854     Direct Acid Fast No acid fast bacilli seen.    JORDAN prep [[id number]] Collected: 05/09/24 1212    Order Status: Completed Specimen: Body Fluid from Pleural Fluid Updated: 05/09/24 1828     KOH Prep No yeast or fungal elements seen    MRSA/SA Rapid ID by PCR from Blood culture [[id number]] Collected: 05/08/24 2205    Order Status: Completed Updated: 05/09/24 1812     Staph aureus ID by PCR Negative     Methicillin Resistant ID by PCR Negative    Narrative:      Site # 1, aerobic and anaerobic    Gram stain [[id number]] Collected: 05/09/24 1212    Order Status: Completed  "Specimen: Body Fluid from Pleural Fluid Updated: 05/09/24 1717     Gram Stain Result Rare WBC's      No organisms seen    AFB Culture & Smear [[id number]] Collected: 05/09/24 1644    Order Status: Canceled Specimen: Respiratory from Sputum, Expectorated     Fungus culture [[id number]] Collected: 05/09/24 1212    Order Status: Sent Specimen: Body Fluid from Pleural Fluid Updated: 05/09/24 1548    Aerobic culture [[id number]]     Order Status: Completed Specimen: Pleural Fluid     Culture, Anaerobe [[id number]]     Order Status: Completed Specimen: Body Fluid from Pleural Fluid     Culture, Anaerobe [[id number]]     Order Status: Canceled Specimen: Body Fluid from Pleural Fluid     Aerobic culture [[id number]]     Order Status: Canceled Specimen: Pleural Fluid           Respiratory Culture:   Recent Labs   Lab 05/09/24  1117   GSRESP <10 epithelial cells per low power field.  No WBC's  Rare Gram negative rods   RESPIRATORYC Normal respiratory av  No S aureus or Pseudomonas isolated.     Wound Culture: No results for input(s): "LABAERO" in the last 4320 hours.    Significant Imaging: I have reviewed all pertinent imaging results/findings within the past 24 hours.  "

## 2024-05-12 NOTE — PROGRESS NOTES
[facility]  Infectious Disease  Progress Note    Patient Name: Oscar Waldron  MRN: [id number]  Admission Date: 5/8/2024  Length of Stay: 3 days  Attending Physician: Gail Araujo MD  Primary Care Provider: No, Primary Doctor    Isolation Status: Airborne  Assessment/Plan:      Pulmonary  * Acute respiratory failure with hypoxia  64 y.o. male with a PMHx of cocaine abuse, HIV not on HAART, tobacco abuse, HTN who present to [facility] for SOB of at least two weeks, endorsed crack cocaine use 2 days prior. Presentation notable for hypoxia, CT showed L apex spiculated nodule, RML GGO changes, multiple small b/l bnodules and large L effusion s/p thoracentesis by pulmonary at bedside, fluid studies and cultures pending. He remains afebrile, HDS. HIV labs pending, non adherence since 2020 reported.     Clinical picture suggests ddx of malignancy, bacterial pneumonia, tuberculosis or atypical mycobacterial infection, and less likely but possible fungal infection (halo sign noted by pulmonary), PJP less likely, cryptococcus as well remains possible.     Fungitell and crypto Ag added today. Pleural studies, serologies and cytology pending, so far differential does not favor typical bacterial organisms but mycobacteria and fungal organisms still possible. CD4 11%, 115, consistent with AIDS.     Recommendations    -Follow up pleural fluid aerobic culture, AFB, fungal culture, cytology and ADA  -MTB rule out: airborne precautions, AFB sputum x 3  by 8 hours with one early AM specimen, MTB PCR from sputum   -Follow up PJP PCR from sputum, if possible to obtain more samples  -Finish course of ceftriaxone and azithromycin for now.   -Patient mentioned he is willing to follow up with Dr. Vy Roas at [facility] clinic for his HIV to start ART outpatient post discharge.         Pleural effusion, left  S/P thoracentesis. Work up in process.  Follow up pending studies.    ID  AIDS (acquired  immunodeficiency syndrome), CD4 <=200  AIDS secondary to CD4 <200. Not on ART.  Patient will need to re-establish care if interested in resuming ART.    GI  Hypodense mass of liver  Multiple hypodense lesions seen on CT imaging.   Consider hepatology consultation for further work up.          Anticipated Disposition: per primary    Thank you for your consult. I will follow-up with patient. Please contact us if you have any additional questions.    Domi Soto MD  Infectious Disease  [facility] - Observation 11H    50 minutes of total time spent on the encounter, which includes face to face time and non-face to face time preparing to see the patient (eg, review of tests), obtaining and/or reviewing separately obtained history, documenting clinical information in the electronic or other health record, independently interpreting results (not separately reported) and communicating results to the patient/family/caregiver, or care coordination (not separately reported).     Subjective:     Principal Problem:Acute respiratory failure with hypoxia    HPI: 64 y.o. male with a PMHx of cocaine abuse, HIV not on HAART, tobacco abuse, HTN who present to [facility] for progressively worsening SOB, endorsed crack cocaine use 2 days prior.     HIV hx: Chart documentation shows he was last seen by Vy Rosa MD back in 5/2020, VL undetectable 1/2019 with CD4 26% at that time, but high concern for non adherence, was missing 2-3 doses weekly while on Genvoya. He remains afebrile, no significant WBC change. Started on azithromycin and ceftriaxone, along with steroids. HDS. S/p thoracentesis by pulmonary at bedside, fluid studies and cultures pending.     CT chest showed a spiculated nodule L apex, GGO RML, and b/l irregular small nodules, mod to large left pleural effusion, worrisome for metastatic process. Patient tells us that he was doing okay prior to onset of symptoms but then suddenly developed acute onset shortness  "of breath with progressive worsening, some congestion, occasional productive cough which is dry most of the time but sometimes notable for brown sputum.  He denies hemoptysis.  Denies fevers, night sweats, rigors, but states he occasionally will have chills.  He denies sick contacts, exotic animals, exotic hobbies, or recent travel.  He states he has been off HIV medication since the COVID pandemic.  He denies prior intolerance to Genvoya and states he tolerated medication well.  He has a 40 pack-year smoking history and also admits to smoking crack pipe 2 days prior to admission.  He states that he quit tobacco a few weeks ago.  He denies diarrhea, recent trauma, new skin ulcers or lesions, pleuritic pain, prior oxygen dependence.  Denies antibiotic allergies.  Rai known exposure to persons with tuberculosis.ID consulted for HIV not on HAART.           Interval History: "Still coughing". No acute overnight events.     Review of Systems   Constitutional:  Negative for chills, fatigue and fever.   HENT:  Negative for ear pain, mouth sores, nosebleeds, postnasal drip, rhinorrhea, sinus pressure, sore throat, tinnitus, trouble swallowing and voice change.    Eyes:  Negative for photophobia, pain, redness and visual disturbance.   Respiratory:  Positive for cough and shortness of breath. Negative for apnea, chest tightness and wheezing.    Cardiovascular:  Negative for chest pain, palpitations and leg swelling.   Gastrointestinal:  Negative for abdominal pain, blood in stool, constipation, diarrhea, nausea and vomiting.   Endocrine: Negative for cold intolerance, heat intolerance, polydipsia and polyuria.   Genitourinary:  Negative for decreased urine volume, difficulty urinating, dysuria, flank pain, frequency, genital sores, hematuria, penile discharge, penile pain, penile swelling, scrotal swelling, testicular pain and urgency.   Musculoskeletal:  Negative for arthralgias, back pain, joint swelling, myalgias and " neck pain.   Skin:  Negative for color change and rash.   Allergic/Immunologic: Negative for environmental allergies and food allergies.   Neurological:  Negative for dizziness, seizures, syncope, weakness, light-headedness, numbness and headaches.   Hematological:  Negative for adenopathy. Does not bruise/bleed easily.   Psychiatric/Behavioral:  Negative for agitation, confusion, decreased concentration, hallucinations, self-injury, sleep disturbance and suicidal ideas. The patient is not nervous/anxious.      Objective:     Vital Signs (Most Recent):  Temp: 98.2 °F (36.8 °C) (05/12/24 0748)  Pulse: 88 (05/12/24 1053)  Resp: 18 (05/12/24 0812)  BP: (!) 160/95 (05/12/24 0748)  SpO2: 95 % (05/12/24 0812) Vital Signs (24h Range):  Temp:  [97.4 °F (36.3 °C)-98.6 °F (37 °C)] 98.2 °F (36.8 °C)  Pulse:  [73-88] 88  Resp:  [18-20] 18  SpO2:  [91 %-98 %] 95 %  BP: (139-168)/(81-95) 160/95     Weight: 68.8 kg (151 lb 10.8 oz)  Body mass index is 25.24 kg/m².    Estimated Creatinine Clearance: 95.2 mL/min (based on SCr of 0.7 mg/dL).     Physical Exam  Vitals reviewed.   Constitutional:       General: He is sleeping.      Appearance: He is well-developed.   HENT:      Head: Normocephalic and atraumatic.      Right Ear: External ear normal.      Left Ear: External ear normal.   Eyes:      Conjunctiva/sclera: Conjunctivae normal.   Neck:      Thyroid: No thyromegaly.   Cardiovascular:      Rate and Rhythm: Normal rate and regular rhythm.      Heart sounds: Normal heart sounds. No murmur heard.  Pulmonary:      Effort: Pulmonary effort is normal.      Breath sounds: Examination of the left-upper field reveals rales. Examination of the left-middle field reveals rales. Examination of the left-lower field reveals decreased breath sounds. Decreased breath sounds and rales present. No wheezing.   Abdominal:      General: Bowel sounds are normal.      Palpations: Abdomen is soft. There is no mass.      Tenderness: There is no  abdominal tenderness. There is no rebound.   Musculoskeletal:         General: Normal range of motion.      Cervical back: Normal range of motion and neck supple.   Lymphadenopathy:      Cervical: No cervical adenopathy.   Skin:     General: Skin is warm and dry.   Neurological:      Mental Status: He is oriented to person, place, and time and easily aroused.   Psychiatric:         Behavior: Behavior normal.          Significant Labs: CBC:   Recent Labs   Lab 05/11/24  0651 05/12/24  0541   WBC 13.60* 14.78*   HGB 15.3 16.0   HCT 48.7 51.0    203     CMP:   Recent Labs   Lab 05/11/24  0651 05/12/24  0541    140   K 4.2 4.4    108   CO2 27 25   GLU 85 91   BUN 12 11   CREATININE 0.8 0.7   CALCIUM 9.1 9.0   ANIONGAP 7* 7*     Microbiology Results (last 7 days)       Procedure Component Value Units Date/Time    AFB Culture & Smear [[id number]] Collected: 05/11/24 0451    Order Status: Completed Specimen: Sputum Updated: 05/12/24 0927     AFB Culture & Smear Culture in progress    Blood culture (site 2) [[id number]] Collected: 05/08/24 2205    Order Status: Completed Specimen: Blood from Peripheral, Antecubital, Right Updated: 05/11/24 2312     Blood Culture, Routine No Growth to date      No Growth to date      No Growth to date      No Growth to date    Narrative:      Site # 2, aerobic only    Blood culture [[id number]] Collected: 05/09/24 1745    Order Status: Completed Specimen: Blood from Peripheral, Antecubital, Left Updated: 05/11/24 2222     Blood Culture, Routine No Growth to date      No Growth to date      No Growth to date    Blood culture [[id number]] Collected: 05/09/24 1745    Order Status: Completed Specimen: Blood from Peripheral, Antecubital, Right Updated: 05/11/24 2222     Blood Culture, Routine No Growth to date      No Growth to date      No Growth to date    AFB Culture & Smear [[id number]] Collected: 05/10/24 0940    Order Status: Completed Specimen: Sputum Updated: 05/11/24  2127     AFB Culture & Smear Culture in progress    Culture, body fluid - Bactec [[id number]] Collected: 05/09/24 1211    Order Status: Completed Specimen: Body Fluid from Thoracentesis Fluid Updated: 05/11/24 1812     Body Fluid Culture, Sterile No Growth to date      No Growth to date      No Growth to date    Cryptococcal antigen [[id number]] Collected: 05/10/24 1406    Order Status: Completed Specimen: Blood, Venous Updated: 05/11/24 1247     Cryptococcal Ag, Blood Negative    Blood culture (site 1) [[id number]]  (Abnormal) Collected: 05/08/24 2205    Order Status: Completed Specimen: Blood from Peripheral, Antecubital, Right Updated: 05/11/24 1103     Blood Culture, Routine Gram stain aer bottle: Gram positive cocci in clusters resembling Staph      Results called to and read back by:Raven Dawson RN 05/09/2024  17:18      COAGULASE-NEGATIVE STAPHYLOCOCCUS SPECIES  Organism is a probable contaminant      Narrative:      Site # 1, aerobic and anaerobic    Culture, Respiratory with Gram Stain [[id number]] Collected: 05/09/24 1117    Order Status: Completed Specimen: Sputum, Expectorated Updated: 05/11/24 0938     Respiratory Culture Normal respiratory av      No S aureus or Pseudomonas isolated.     Gram Stain (Respiratory) <10 epithelial cells per low power field.     Gram Stain (Respiratory) No WBC's     Gram Stain (Respiratory) Rare Gram negative rods    Fungus culture [[id number]] Collected: 05/11/24 0451    Order Status: Sent Specimen: Sputum Updated: 05/11/24 0517    AFB culture [[id number]] Collected: 05/09/24 1212    Order Status: Completed Specimen: Body Fluid from Pleural Fluid Updated: 05/10/24 2127     AFB Culture & Smear Culture in progress     AFB CULTURE STAIN No acid fast bacilli seen.    AFB Culture & Smear [[id number]] Collected: 05/09/24 1644    Order Status: Completed Specimen: Respiratory from Sputum, Expectorated Updated: 05/10/24 2127     AFB Culture & Smear Culture in progress     AFB  "CULTURE STAIN No acid fast bacilli seen.    AFB Culture & Smear [[id number]]     Order Status: Canceled Specimen: Respiratory from Sputum, Expectorated     AFB stain [[id number]] Collected: 05/09/24 1212    Order Status: Completed Specimen: Body Fluid from Pleural Fluid Updated: 05/09/24 1854     Direct Acid Fast No acid fast bacilli seen.    JORDAN prep [[id number]] Collected: 05/09/24 1212    Order Status: Completed Specimen: Body Fluid from Pleural Fluid Updated: 05/09/24 1828     KOH Prep No yeast or fungal elements seen    MRSA/SA Rapid ID by PCR from Blood culture [[id number]] Collected: 05/08/24 2205    Order Status: Completed Updated: 05/09/24 1812     Staph aureus ID by PCR Negative     Methicillin Resistant ID by PCR Negative    Narrative:      Site # 1, aerobic and anaerobic    Gram stain [[id number]] Collected: 05/09/24 1212    Order Status: Completed Specimen: Body Fluid from Pleural Fluid Updated: 05/09/24 1717     Gram Stain Result Rare WBC's      No organisms seen    AFB Culture & Smear [[id number]] Collected: 05/09/24 1644    Order Status: Canceled Specimen: Respiratory from Sputum, Expectorated     Fungus culture [[id number]] Collected: 05/09/24 1212    Order Status: Sent Specimen: Body Fluid from Pleural Fluid Updated: 05/09/24 1548    Aerobic culture [[id number]]     Order Status: Completed Specimen: Pleural Fluid     Culture, Anaerobe [[id number]]     Order Status: Completed Specimen: Body Fluid from Pleural Fluid     Culture, Anaerobe [[id number]]     Order Status: Canceled Specimen: Body Fluid from Pleural Fluid     Aerobic culture [[id number]]     Order Status: Canceled Specimen: Pleural Fluid           Respiratory Culture:   Recent Labs   Lab 05/09/24  1117   GSRESP <10 epithelial cells per low power field.  No WBC's  Rare Gram negative rods   RESPIRATORYC Normal respiratory av  No S aureus or Pseudomonas isolated.     Wound Culture: No results for input(s): "LABAERO" in the last 4320 " hours.    Significant Imaging: I have reviewed all pertinent imaging results/findings within the past 24 hours.

## 2024-05-12 NOTE — NURSING
ED did not administer bowel regimen before sending patient to floor. Patient was lethargic upon arrival on the floor and has some complaints of pain on her buttocks that is red and blanchable. Spoke with on call CARLO Swain about giving something oral to promote a BM versus rectal medication. Senna-docusate and lactulose given.  Patient is asleep.

## 2024-05-12 NOTE — PROGRESS NOTES
[facility] Hospital Medicine  Progress Note    Primary Team: [facility] HOSP MED O  Admit Date: 5/8/2024    Subjective:      HPI:  Oscar Waldron is a 64 y.o. male with a PMHx of cocaine abuse, HIV not on HAART, tobacco abuse, HTN who present to [facility] for evaluation of shortness of breath. Patient reports progressive worsening of shortness of breath over the past few weeks. He now has a  productive cough with associated left sided chest/ left upper abdomen pain which only occurs during coughing fits. Shortness of breath worsens while lying flat and with minimal exertion. No improvement with albuterol inhaler that he borrowed from a family member. Admits to smoking crack cocaine 2 days ago. Has >40 year hx of tobacco abuse. He stopped taking his HIV medications a few years ago.       ED: tachycardic to , improved without intervention. Satting 96% on 3L NC. BNP <10, trop 0.010. EKG with non-specifically TWAs. CXR with cardiomegaly with pulmonary vascular congestion and bilateral interstitial opacities, moderate left-sided pleural effusion with associated compressive atelectasis. Given solumedrol 125mg, azithro and rocephin.      Overview/Hospital Course:  Oscar Waldron is placed in  Observation for management of acute respiratory failure with hypoxia. Requiring supplemental oxygen on admit. On IV rocephin and azithromycin. CT chest reviewed, concern for malignant/metastatic process vs infectious process. Pulmonology and ID consulted. S/p thoracentesis for L pleural effusion 5/9 with 950 mL removed & showing exudative process. Workup underway.      Interval History:  No acute events overnight.  Endorses a cough that feels wet, but is having trouble producing sputum. Will order Mucomyst, Acapella, and CPT.  Cultures remain NGTD.  Still on 2L NC    ROS:  As per HPI  General: no fever, no chills, no weight loss, no fatigue  Cardiovascular: no chest pain, no orthopnea  Respiratory: + cough  All other  systems reviewed & are negative.     No past medical history on file.  No past surgical history on file.      Objective:   Last 24 Hour Vital Signs:  BP  Min: 139/81  Max: 168/95  Temp  Av.2 °F (36.8 °C)  Min: 97.4 °F (36.3 °C)  Max: 98.6 °F (37 °C)  Pulse  Av.9  Min: 73  Max: 85  Resp  Av.5  Min: 18  Max: 20  SpO2  Av.5 %  Min: 91 %  Max: 98 %  I/O last 3 completed shifts:  In: -   Out: 1100 [Urine:1100]    Physical Examination:  GEN: AAOx3, NAD  HEENT: NCAT, MMM, PERRL, EOMI, oropharynx clear  CV: RRR, no m/r, no S3/S4  RESP: CTAB, no wheezes/crackles, no increased WOB, on 2L NC  ABD: soft, NTND, normoactive BS, no organomegaly  EXTR: no c/c/e, intact distal pulses x 4  NEURO: PERRL, EOMI, moving all four extremities, intact sensation to light touch, no focal deficits  SKIN: no rashes, lesions, or color changes  PSYCH: normal affect    Laboratory:  I have reviewed all pertinent lab results/findings within the past 24 hours.    Radiology:  I have reviewed all pertinent imaging results/findings within the past 24 hours.    Current Medications:     Infusions:       Scheduled:   acetylcysteine 100 mg/ml (10%)  4 mL Nebulization TID WAKE    benzonatate  100 mg Oral TID    cefTRIAXone (Rocephin) IV (PEDS and ADULTS)  2 g Intravenous Q24H    guaiFENesin  1,200 mg Oral BID        PRN:    Current Facility-Administered Medications:     acetaminophen, 650 mg, Oral, Q4H PRN    albuterol-ipratropium, 3 mL, Nebulization, Q6H PRN    bisacodyL, 10 mg, Rectal, Daily PRN    dextrose 10%, 12.5 g, Intravenous, PRN    dextrose 10%, 25 g, Intravenous, PRN    glucagon (human recombinant), 1 mg, Intramuscular, PRN    glucose, 16 g, Oral, PRN    glucose, 24 g, Oral, PRN    hydrOXYzine HCL, 10 mg, Oral, TID PRN    iohexol, 15 mL, Oral, PRN    melatonin, 6 mg, Oral, Nightly PRN    ondansetron, 8 mg, Oral, Q8H PRN    polyethylene glycol, 17 g, Oral, Daily PRN    promethazine, 25 mg, Oral, Q6H PRN    Prior records  reviewed.       Assessment/Plan:     Oscar Waldron is a 64 y.o.male with     Acute respiratory failure with hypoxia  Abnormal CT of lung  - suspect has undiagnosed COPD with acute exacerbation, large L pleural effusion contributing to symptoms  - satting 96% on 2L NC  - s/p solumedrol 125mg IVP; cont prednisone  - rocephin/azithro  - Duonebs changed to Mucomyst to assist with sputum production   - Acapella and CPT ordered  - pulm & ID consulted  - sputum cx - normal respiratory av  - mTB PCR pending  - PJP PCP pending  - aspergillus antigen pending  - fungitell pending  - crypto antigen pending  - AFB smear negative x 2; 3rd smear collected today. Follow AFB cultures  - follow up pleural fluid cultures, cytology - currently NGTD  - airborne precautions     Hypophosphatemia  - phos 1.5, replaced  - daily phos     Pleural effusion, left  - unclear cause  - BNP <10  - s/p lasix 20mg IVP x 1  - CT chest reviewed  - pulm consulted, did thora 5/9 with 950 ml removed which was stopped 2/2 chest pain; pleural fluid studies suggestive of exudative process  - AFB stain and KOH prep negative  - gram stain: no organisms seen  - follow up cytology, cultures - currently NGTD  - pulmonology considering repeat thora     High cholesterol  - not on statin  - lipid panel reviewed     HTN (hypertension)  - not on home meds  - controlled currently     Human immunodeficiency virus (HIV) disease  AIDS  - non-adherent to ART; off since 2020  - CD4 115  - ID consulted  - f/u with Dr Rosa at [facility] for ART      Gail Araujo MD  Hospital Medicine Staff  [facility]

## 2024-05-13 LAB
1,3 BETA GLUCAN SER-MCNC: 36 PG/ML
ACID FAST MOD KINY STN SPEC: NORMAL
ACID FAST MOD KINY STN SPEC: NORMAL
ANNOTATION COMMENT IMP: NORMAL
BACTERIA BLD CULT: NORMAL
BASOPHILS # BLD AUTO: 0.07 K/UL (ref 0–0.2)
BASOPHILS NFR BLD: 0.6 % (ref 0–1.9)
DIFFERENTIAL METHOD BLD: ABNORMAL
EOSINOPHIL # BLD AUTO: 0.6 K/UL (ref 0–0.5)
EOSINOPHIL NFR BLD: 5.3 % (ref 0–8)
ERYTHROCYTE [DISTWIDTH] IN BLOOD BY AUTOMATED COUNT: 15.1 % (ref 11.5–14.5)
FUNGITELL COMMENTS: NEGATIVE
GALACTOMANNAN AG SERPL IA-ACNC: <0.5 INDEX
HCT VFR BLD AUTO: 52.6 % (ref 40–54)
HGB BLD-MCNC: 16.4 G/DL (ref 14–18)
IMM GRANULOCYTES # BLD AUTO: 0.09 K/UL (ref 0–0.04)
IMM GRANULOCYTES NFR BLD AUTO: 0.8 % (ref 0–0.5)
LYMPHOCYTES # BLD AUTO: 1.5 K/UL (ref 1–4.8)
LYMPHOCYTES NFR BLD: 13.6 % (ref 18–48)
M TB CMPLX DNA SPEC QL NAA+PROBE: NORMAL
MCH RBC QN AUTO: 28.1 PG (ref 27–31)
MCHC RBC AUTO-ENTMCNC: 31.2 G/DL (ref 32–36)
MCV RBC AUTO: 90 FL (ref 82–98)
MONOCYTES # BLD AUTO: 1.4 K/UL (ref 0.3–1)
MONOCYTES NFR BLD: 12.7 % (ref 4–15)
MYCOBACTERIUM SPEC QL CULT: NORMAL
MYCOBACTERIUM SPEC QL CULT: NORMAL
NEUTROPHILS # BLD AUTO: 7.4 K/UL (ref 1.8–7.7)
NEUTROPHILS NFR BLD: 67 % (ref 38–73)
NRBC BLD-RTO: 0 /100 WBC
P JIROVECII DNA L RESP QL NAA+NON-PROBE: NORMAL
PLATELET # BLD AUTO: 216 K/UL (ref 150–450)
PMV BLD AUTO: 8.6 FL (ref 9.2–12.9)
PNEUMOCYSTIS REPORT STATUS: NORMAL
RBC # BLD AUTO: 5.84 M/UL (ref 4.6–6.2)
SPECIMEN SOURCE: NORMAL
SPECIMEN SOURCE: NORMAL
WBC # BLD AUTO: 11.08 K/UL (ref 3.9–12.7)

## 2024-05-13 PROCEDURE — 99900035 HC TECH TIME PER 15 MIN (STAT)

## 2024-05-13 PROCEDURE — 94664 DEMO&/EVAL PT USE INHALER: CPT

## 2024-05-13 PROCEDURE — 25000003 PHARM REV CODE 250: Performed by: HOSPITALIST

## 2024-05-13 PROCEDURE — 25000242 PHARM REV CODE 250 ALT 637 W/ HCPCS: Performed by: HOSPITALIST

## 2024-05-13 PROCEDURE — 85025 COMPLETE CBC W/AUTO DIFF WBC: CPT | Performed by: PHYSICIAN ASSISTANT

## 2024-05-13 PROCEDURE — 21400001 HC TELEMETRY ROOM

## 2024-05-13 PROCEDURE — 63600175 PHARM REV CODE 636 W HCPCS

## 2024-05-13 PROCEDURE — 25000242 PHARM REV CODE 250 ALT 637 W/ HCPCS

## 2024-05-13 PROCEDURE — 36415 COLL VENOUS BLD VENIPUNCTURE: CPT | Performed by: PHYSICIAN ASSISTANT

## 2024-05-13 PROCEDURE — 99233 SBSQ HOSP IP/OBS HIGH 50: CPT | Mod: ,,, | Performed by: INTERNAL MEDICINE

## 2024-05-13 PROCEDURE — 25000003 PHARM REV CODE 250

## 2024-05-13 PROCEDURE — 94761 N-INVAS EAR/PLS OXIMETRY MLT: CPT

## 2024-05-13 PROCEDURE — 27000207 HC ISOLATION

## 2024-05-13 PROCEDURE — 27000221 HC OXYGEN, UP TO 24 HOURS

## 2024-05-13 PROCEDURE — 94640 AIRWAY INHALATION TREATMENT: CPT

## 2024-05-13 PROCEDURE — 25000003 PHARM REV CODE 250: Performed by: PHYSICIAN ASSISTANT

## 2024-05-13 RX ADMIN — HYDROXYZINE HYDROCHLORIDE 10 MG: 10 TABLET ORAL at 05:05

## 2024-05-13 RX ADMIN — CEFTRIAXONE 2 G: 2 INJECTION, POWDER, FOR SOLUTION INTRAMUSCULAR; INTRAVENOUS at 08:05

## 2024-05-13 RX ADMIN — BENZONATATE 100 MG: 100 CAPSULE ORAL at 09:05

## 2024-05-13 RX ADMIN — SODIUM CHLORIDE SOLN NEBU 3% 4 ML: 3 NEBU SOLN at 04:05

## 2024-05-13 RX ADMIN — IPRATROPIUM BROMIDE AND ALBUTEROL SULFATE 3 ML: 2.5; .5 SOLUTION RESPIRATORY (INHALATION) at 07:05

## 2024-05-13 RX ADMIN — BENZONATATE 100 MG: 100 CAPSULE ORAL at 08:05

## 2024-05-13 RX ADMIN — SODIUM CHLORIDE SOLN NEBU 3% 4 ML: 3 NEBU SOLN at 07:05

## 2024-05-13 RX ADMIN — GUAIFENESIN 1200 MG: 600 TABLET, EXTENDED RELEASE ORAL at 08:05

## 2024-05-13 RX ADMIN — BENZONATATE 100 MG: 100 CAPSULE ORAL at 03:05

## 2024-05-13 RX ADMIN — MELATONIN TAB 3 MG 6 MG: 3 TAB at 09:05

## 2024-05-13 NOTE — PROGRESS NOTES
[facility] Hospital Medicine  Progress Note    Primary Team: [facility]OSP MED O  Admit Date: 5/8/2024    Subjective:      HPI:  Oscar Waldron is a 64 y.o. male with a PMHx of cocaine abuse, HIV not on HAART, tobacco abuse, HTN who present to [facility] for evaluation of shortness of breath. Patient reports progressive worsening of shortness of breath over the past few weeks. He now has a  productive cough with associated left sided chest/ left upper abdomen pain which only occurs during coughing fits. Shortness of breath worsens while lying flat and with minimal exertion. No improvement with albuterol inhaler that he borrowed from a family member. Admits to smoking crack cocaine 2 days ago. Has >40 year hx of tobacco abuse. He stopped taking his HIV medications a few years ago.       ED: tachycardic to , improved without intervention. Satting 96% on 3L NC. BNP <10, trop 0.010. EKG with non-specifically TWAs. CXR with cardiomegaly with pulmonary vascular congestion and bilateral interstitial opacities, moderate left-sided pleural effusion with associated compressive atelectasis. Given solumedrol 125mg, azithro and rocephin.      Overview/Hospital Course:  Oscar Waldron is placed in  Observation for management of acute respiratory failure with hypoxia. Requiring supplemental oxygen on admit. On IV rocephin and azithromycin. CT chest reviewed, concern for malignant/metastatic process vs infectious process. Pulmonology and ID consulted. S/p thoracentesis for L pleural effusion 5/9 with 950 mL removed & showing exudative process. Workup underway.      Interval History:  No acute events overnight.  Feels frustrated that his respiratory symptoms aren't improving. Awaiting workup.    ROS:  As per HPI  General: no fever, no chills, no weight loss, no fatigue  Cardiovascular: no chest pain, no orthopnea  Respiratory: + cough  All other systems reviewed & are negative.     No past medical history on file.  No  past surgical history on file.      Objective:   Last 24 Hour Vital Signs:  BP  Min: 143/81  Max: 176/101  Temp  Av.4 °F (36.9 °C)  Min: 97.8 °F (36.6 °C)  Max: 98.6 °F (37 °C)  Pulse  Av.2  Min: 79  Max: 97  Resp  Av  Min: 18  Max: 20  SpO2  Av.3 %  Min: 92 %  Max: 96 %  I/O last 3 completed shifts:  In: -   Out: 1050 [Urine:1050]    Physical Examination:  GEN: AAOx3, NAD  HEENT: NCAT, MMM, PERRL, EOMI, oropharynx clear  CV: RRR, no m/r, no S3/S4  RESP: CTAB, no wheezes/crackles, no increased WOB, on 2L NC  ABD: soft, NTND, normoactive BS, no organomegaly  EXTR: no c/c/e, intact distal pulses x 4  NEURO: PERRL, EOMI, moving all four extremities, intact sensation to light touch, no focal deficits  SKIN: no rashes, lesions, or color changes  PSYCH: normal affect    Laboratory:  I have reviewed all pertinent lab results/findings within the past 24 hours.    Radiology:  I have reviewed all pertinent imaging results/findings within the past 24 hours.    Current Medications:     Infusions:       Scheduled:   benzonatate  100 mg Oral TID    cefTRIAXone (Rocephin) IV (PEDS and ADULTS)  2 g Intravenous Q24H    guaiFENesin  1,200 mg Oral BID    sodium chloride 3%  4 mL Nebulization Q8H        PRN:    Current Facility-Administered Medications:     acetaminophen, 650 mg, Oral, Q4H PRN    albuterol-ipratropium, 3 mL, Nebulization, Q6H PRN    bisacodyL, 10 mg, Rectal, Daily PRN    dextrose 10%, 12.5 g, Intravenous, PRN    dextrose 10%, 25 g, Intravenous, PRN    glucagon (human recombinant), 1 mg, Intramuscular, PRN    glucose, 16 g, Oral, PRN    glucose, 24 g, Oral, PRN    hydrOXYzine HCL, 10 mg, Oral, TID PRN    iohexol, 15 mL, Oral, PRN    melatonin, 6 mg, Oral, Nightly PRN    ondansetron, 8 mg, Oral, Q8H PRN    polyethylene glycol, 17 g, Oral, Daily PRN    promethazine, 25 mg, Oral, Q6H PRN    Prior records reviewed.       Assessment/Plan:     Oscar Waldron is a 64 y.o.male with     Acute respiratory  failure with hypoxia  Abnormal CT of lung  - suspect has undiagnosed COPD with acute exacerbation, large L pleural effusion contributing to symptoms  - satting 96% on 2L NC  - s/p solumedrol 125mg IVP; cont prednisone  - rocephin/azithro  - Duonebs changed to Mucomyst to assist with sputum production   - Acapella and CPT ordered  - pulm & ID consulted  - sputum cx - normal respiratory av  - mTB PCR pending  - PJP PCP pending  - aspergillus antigen pending  - fungitell pending  - crypto antigen negative  - AFB smear negative x 1; await negative smears x 3. Follow AFB cultures  - follow up pleural fluid cultures, cytology - currently NGTD  - airborne precautions     Hypophosphatemia  - phos 1.5, replaced  - daily phos     Pleural effusion, left  - unclear cause  - BNP <10  - s/p lasix 20mg IVP x 1  - CT chest reviewed  - pulm consulted, did thora 5/9 with 950 ml removed which was stopped 2/2 chest pain; pleural fluid studies suggestive of exudative process  - AFB stain and KOH prep negative  - gram stain: no organisms seen  - follow up cytology, cultures - currently NGTD  - pulmonology considering repeat thora     High cholesterol  - not on statin  - lipid panel reviewed     HTN (hypertension)  - not on home meds  - controlled currently     Human immunodeficiency virus (HIV) disease  AIDS  - non-adherent to ART; off since 2020  - CD4 115  - ID consulted  - f/u with Dr Rosa at [facility] for ART      Gail Araujo MD  Hospital Medicine Staff  [facility]

## 2024-05-13 NOTE — SUBJECTIVE & OBJECTIVE
Interval History: SOB somewhat improved, able to walk around his room without distress today     Review of Systems  Constitutional:  Positive for chills and fatigue. Negative for fever.   HENT:  Negative for trouble swallowing.    Respiratory:  Positive for cough and shortness of breath.    Gastrointestinal:  Negative for abdominal pain, blood in stool, diarrhea and vomiting.   Genitourinary:  Negative for dysuria and hematuria.   Musculoskeletal:  Negative for arthralgias, joint swelling and neck stiffness.   Neurological:  Negative for syncope.   Psychiatric/Behavioral:  Negative for confusion.    Objective:     Objective:     Vital Signs (Most Recent):  Temp: 98.2 °F (36.8 °C) (05/13/24 1327)  Pulse: 97 (05/13/24 1507)  Resp: 19 (05/13/24 1327)  BP: (!) 174/88 (05/13/24 1327)  SpO2: (!) 93 % (05/13/24 1327) Vital Signs (24h Range):  Temp:  [97.8 °F (36.6 °C)-98.6 °F (37 °C)] 98.2 °F (36.8 °C)  Pulse:  [81-97] 97  Resp:  [19-20] 19  SpO2:  [92 %-94 %] 93 %  BP: (143-176)/() 174/88     Weight: 68.8 kg (151 lb 10.8 oz)  Body mass index is 25.24 kg/m².    Estimated Creatinine Clearance: 95.2 mL/min (based on SCr of 0.7 mg/dL).     Physical Exam   Constitutional:       Appearance: Normal appearance.   HENT:      Head: Normocephalic and atraumatic.      Nose: Nose normal.      Mouth/Throat:      Mouth: Mucous membranes are moist.      Pharynx: Oropharynx is clear.   Eyes:      Conjunctiva/sclera: Conjunctivae normal.   Cardiovascular:      Rate and Rhythm: Normal rate and regular rhythm.      Pulses: Normal pulses.      Heart sounds: Normal heart sounds.   Pulmonary:      Effort: Respiratory distress present.      Breath sounds: Rales present.      Comments: O2 support  Abdominal:      General: Bowel sounds are normal.      Palpations: Abdomen is soft.      Tenderness: There is no guarding or rebound.   Musculoskeletal:         General: No deformity.      Cervical back: Neck supple.      Right lower leg: Edema  present.      Left lower leg: Edema present.   Skin:     General: Skin is warm and dry.      Coloration: Skin is not jaundiced.      Findings: No rash.   Neurological:      Mental Status: He is alert and oriented to person, place, and time. Mental status is at baseline.   Significant Labs:   Microbiology Results (last 7 days)       Procedure Component Value Units Date/Time    AFB Culture & Smear [[id number]]     Order Status: Sent Specimen: Blood     Fungus culture [[id number]] Collected: 05/11/24 0451    Order Status: Completed Specimen: Sputum Updated: 05/13/24 1149     Fungus (Mycology) Culture Culture in progress    Fungus culture [[id number]] Collected: 05/09/24 1212    Order Status: Completed Specimen: Body Fluid from Pleural Fluid Updated: 05/13/24 1149     Fungus (Mycology) Culture Culture in progress    Blood culture (site 2) [[id number]] Collected: 05/08/24 2205    Order Status: Completed Specimen: Blood from Peripheral, Antecubital, Right Updated: 05/12/24 2312     Blood Culture, Routine No Growth to date      No Growth to date      No Growth to date      No Growth to date      No Growth to date    Narrative:      Site # 2, aerobic only    Blood culture [[id number]] Collected: 05/09/24 1745    Order Status: Completed Specimen: Blood from Peripheral, Antecubital, Left Updated: 05/12/24 2222     Blood Culture, Routine No Growth to date      No Growth to date      No Growth to date      No Growth to date    Blood culture [[id number]] Collected: 05/09/24 1745    Order Status: Completed Specimen: Blood from Peripheral, Antecubital, Right Updated: 05/12/24 2222     Blood Culture, Routine No Growth to date      No Growth to date      No Growth to date      No Growth to date    Culture, body fluid - Bactec [[id number]] Collected: 05/09/24 1211    Order Status: Completed Specimen: Body Fluid from Thoracentesis Fluid Updated: 05/12/24 1812     Body Fluid Culture, Sterile No Growth to date      No Growth to date       No Growth to date      No Growth to date    AFB Culture & Smear [[id number]] Collected: 05/11/24 0451    Order Status: Completed Specimen: Sputum Updated: 05/12/24 0927     AFB Culture & Smear Culture in progress    AFB Culture & Smear [[id number]] Collected: 05/10/24 0940    Order Status: Completed Specimen: Sputum Updated: 05/11/24 2127     AFB Culture & Smear Culture in progress    Cryptococcal antigen [[id number]] Collected: 05/10/24 1406    Order Status: Completed Specimen: Blood, Venous Updated: 05/11/24 1247     Cryptococcal Ag, Blood Negative    Blood culture (site 1) [[id number]]  (Abnormal) Collected: 05/08/24 2205    Order Status: Completed Specimen: Blood from Peripheral, Antecubital, Right Updated: 05/11/24 1103     Blood Culture, Routine Gram stain aer bottle: Gram positive cocci in clusters resembling Staph      Results called to and read back by:Raven Dawson RN 05/09/2024  17:18      COAGULASE-NEGATIVE STAPHYLOCOCCUS SPECIES  Organism is a probable contaminant      Narrative:      Site # 1, aerobic and anaerobic    Culture, Respiratory with Gram Stain [[id number]] Collected: 05/09/24 1117    Order Status: Completed Specimen: Sputum, Expectorated Updated: 05/11/24 0938     Respiratory Culture Normal respiratory av      No S aureus or Pseudomonas isolated.     Gram Stain (Respiratory) <10 epithelial cells per low power field.     Gram Stain (Respiratory) No WBC's     Gram Stain (Respiratory) Rare Gram negative rods    AFB culture [[id number]] Collected: 05/09/24 1212    Order Status: Completed Specimen: Body Fluid from Pleural Fluid Updated: 05/10/24 2127     AFB Culture & Smear Culture in progress     AFB CULTURE STAIN No acid fast bacilli seen.    AFB Culture & Smear [[id number]] Collected: 05/09/24 1644    Order Status: Completed Specimen: Respiratory from Sputum, Expectorated Updated: 05/10/24 2127     AFB Culture & Smear Culture in progress     AFB CULTURE STAIN No acid fast bacilli seen.     AFB Culture & Smear [[id number]]     Order Status: Canceled Specimen: Respiratory from Sputum, Expectorated     AFB stain [[id number]] Collected: 05/09/24 1212    Order Status: Completed Specimen: Body Fluid from Pleural Fluid Updated: 05/09/24 1854     Direct Acid Fast No acid fast bacilli seen.    JORDAN prep [[id number]] Collected: 05/09/24 1212    Order Status: Completed Specimen: Body Fluid from Pleural Fluid Updated: 05/09/24 1828     KOH Prep No yeast or fungal elements seen    MRSA/SA Rapid ID by PCR from Blood culture [[id number]] Collected: 05/08/24 2205    Order Status: Completed Updated: 05/09/24 1812     Staph aureus ID by PCR Negative     Methicillin Resistant ID by PCR Negative    Narrative:      Site # 1, aerobic and anaerobic    Gram stain [[id number]] Collected: 05/09/24 1212    Order Status: Completed Specimen: Body Fluid from Pleural Fluid Updated: 05/09/24 1717     Gram Stain Result Rare WBC's      No organisms seen    AFB Culture & Smear [[id number]] Collected: 05/09/24 1644    Order Status: Canceled Specimen: Respiratory from Sputum, Expectorated     Aerobic culture [[id number]]     Order Status: Completed Specimen: Pleural Fluid     Culture, Anaerobe [[id number]]     Order Status: Completed Specimen: Body Fluid from Pleural Fluid     Culture, Anaerobe [[id number]]     Order Status: Canceled Specimen: Body Fluid from Pleural Fluid     Aerobic culture [[id number]]     Order Status: Canceled Specimen: Pleural Fluid           Recent Lab Results         05/13/24  0653        Baso # 0.07       Basophil % 0.6       Differential Method Automated       Eos # 0.6       Eos % 5.3       Gran # (ANC) 7.4       Gran % 67.0       Hematocrit 52.6       Hemoglobin 16.4       Immature Grans (Abs) 0.09  Comment: Mild elevation in immature granulocytes is non specific and   can be seen in a variety of conditions including stress response,   acute inflammation, trauma and pregnancy. Correlation with other    laboratory and clinical findings is essential.         Immature Granulocytes 0.8       Lymph # 1.5       Lymph % 13.6       MCH 28.1       MCHC 31.2       MCV 90       Mono # 1.4       Mono % 12.7       MPV 8.6       nRBC 0       Platelet Count 216       RBC 5.84       RDW 15.1       WBC 11.08               Significant Imaging: I have reviewed all pertinent imaging results/findings within the past 24 hours.

## 2024-05-13 NOTE — PLAN OF CARE
Problem: Adult Inpatient Plan of Care  Goal: Plan of Care Review  Outcome: Progressing  Goal: Patient-Specific Goal (Individualized)  Outcome: Progressing  Goal: Absence of Hospital-Acquired Illness or Injury  Outcome: Progressing  Goal: Optimal Comfort and Wellbeing  Outcome: Progressing  Goal: Readiness for Transition of Care  Outcome: Progressing     Problem: COPD (Chronic Obstructive Pulmonary Disease)  Goal: Optimal Chronic Illness Coping  Outcome: Progressing  Goal: Optimal Level of Functional Dickenson  Outcome: Progressing  Goal: Absence of Infection Signs and Symptoms  Outcome: Progressing  Goal: Improved Oral Intake  Outcome: Progressing  Goal: Effective Oxygenation and Ventilation  Outcome: Progressing     Problem: Infection  Goal: Absence of Infection Signs and Symptoms  Outcome: Progressing

## 2024-05-13 NOTE — ASSESSMENT & PLAN NOTE
64 y.o. male with a PMHx of cocaine abuse, HIV not on HAART, tobacco abuse, HTN who present to [facility] for SOB of at least two weeks, endorsed crack cocaine use 2 days prior. Presentation notable for hypoxia, CT showed L apex spiculated nodule, RML GGO changes, multiple small b/l bnodules and large L effusion s/p thoracentesis by pulmonary at bedside, fluid studies and cultures pending. He remains afebrile, HDS. HIV labs pending, non adherence since 2020 reported.     Clinical picture suggests ddx of malignancy, bacterial pneumonia, tuberculosis or atypical mycobacterial infection, and less likely but possible fungal infection (halo sign noted by pulmonary), PJP less likely, cryptococcus as well remains possible.     Fungitell, PJP, ADA and PJP sputum, crypto Ag negative. Pleural cx and cytology pending, so far differential does not favor typical bacterial organisms but mycobacterial infection still possible. CD4 11%, 115, consistent with AIDS. Discussed with pulmonary medicine, they favor waiting 6 weeks before repeating imaging to assess for biopsy candidacy given that infection remains high on ddx.     Recommendations    -Follow up pleural fluid AFB, fungal culture & cytology   -MTB rule out: airborne precautions, AFB sputum x 3  by 8 hours with one early AM specimen  -Follow up PJP PCR from sputum, if possible to obtain more samples  -Finish course of ceftriaxone and azithromycin for now.   -Patient mentioned he is willing to follow up with Dr. Vy Rosa at [facility] clinic for his HIV to start ART outpatient post discharge.

## 2024-05-13 NOTE — PROGRESS NOTES
[facility]  Infectious Disease  Progress Note    Patient Name: Oscar Waldron  MRN: [id number]  Admission Date: 5/8/2024  Length of Stay: 4 days  Attending Physician: Gail Jacome MD  Primary Care Provider: No, Primary Doctor    Isolation Status: Airborne  Assessment/Plan:      Pulmonary  * Acute respiratory failure with hypoxia  64 y.o. male with a PMHx of cocaine abuse, HIV not on HAART, tobacco abuse, HTN who present to [facility] for SOB of at least two weeks, endorsed crack cocaine use 2 days prior. Presentation notable for hypoxia, CT showed L apex spiculated nodule, RML GGO changes, multiple small b/l bnodules and large L effusion s/p thoracentesis by pulmonary at bedside, fluid studies and cultures pending. He remains afebrile, HDS. HIV labs pending, non adherence since 2020 reported.     Clinical picture suggests ddx of malignancy, bacterial pneumonia, tuberculosis or atypical mycobacterial infection, and less likely but possible fungal infection (halo sign noted by pulmonary), PJP less likely, cryptococcus as well remains possible.     Fungitell, PJP, ADA and PJP sputum, crypto Ag negative. Pleural cx and cytology pending, so far differential does not favor typical bacterial organisms but mycobacterial infection still possible. CD4 11%, 115, consistent with AIDS. Discussed with pulmonary medicine, they favor waiting 6 weeks before repeating imaging to assess for biopsy candidacy given that infection remains high on ddx.     Recommendations    -Follow up pleural fluid AFB, fungal culture & cytology   -MTB rule out: airborne precautions, AFB sputum x 3  by 8 hours with one early AM specimen  -Follow up PJP PCR from sputum, if possible to obtain more samples  -Finish course of ceftriaxone and azithromycin for now.   -Patient mentioned he is willing to follow up with Dr. Vy Rosa at [facility] for his HIV to start ART outpatient post discharge.         ID  AIDS (acquired  immunodeficiency syndrome), CD4 <=200  AIDS secondary to CD4 <200. Not on ART.  Patient will need to re-establish care if interested in resuming ART.        Anticipated Disposition: TBD    Thank you for your consult. I will follow-up with patient. Please contact us if you have any additional questions.    Luc Shaffer MD  Infectious Disease  [address] [facility]    Subjective:     Principal Problem:Acute respiratory failure with hypoxia    HPI: 64 y.o. male with a PMHx of cocaine abuse, HIV not on HAART, tobacco abuse, HTN who present to [facility] for progressively worsening SOB, endorsed crack cocaine use 2 days prior.     HIV hx: Chart documentation shows he was last seen by Vy Rosa MD back in 5/2020, VL undetectable 1/2019 with CD4 26% at that time, but high concern for non adherence, was missing 2-3 doses weekly while on Genvoya. He remains afebrile, no significant WBC change. Started on azithromycin and ceftriaxone, along with steroids. HDS. S/p thoracentesis by pulmonary at bedside, fluid studies and cultures pending.     CT chest showed a spiculated nodule L apex, GGO RML, and b/l irregular small nodules, mod to large left pleural effusion, worrisome for metastatic process. Patient tells us that he was doing okay prior to onset of symptoms but then suddenly developed acute onset shortness of breath with progressive worsening, some congestion, occasional productive cough which is dry most of the time but sometimes notable for brown sputum.  He denies hemoptysis.  Denies fevers, night sweats, rigors, but states he occasionally will have chills.  He denies sick contacts, exotic animals, exotic hobbies, or recent travel.  He states he has been off HIV medication since the COVID pandemic.  He denies prior intolerance to Genvoya and states he tolerated medication well.  He has a 40 pack-year smoking history and also admits to smoking crack pipe 2 days prior to admission.  He states that he quit  tobacco a few weeks ago.  He denies diarrhea, recent trauma, new skin ulcers or lesions, pleuritic pain, prior oxygen dependence.  Denies antibiotic allergies.  Rai known exposure to persons with tuberculosis.ID consulted for HIV not on HAART.           Interval History: SOB somewhat improved, able to walk around his room without distress today     Review of Systems  Constitutional:  Positive for chills and fatigue. Negative for fever.   HENT:  Negative for trouble swallowing.    Respiratory:  Positive for cough and shortness of breath.    Gastrointestinal:  Negative for abdominal pain, blood in stool, diarrhea and vomiting.   Genitourinary:  Negative for dysuria and hematuria.   Musculoskeletal:  Negative for arthralgias, joint swelling and neck stiffness.   Neurological:  Negative for syncope.   Psychiatric/Behavioral:  Negative for confusion.    Objective:     Objective:     Vital Signs (Most Recent):  Temp: 98.2 °F (36.8 °C) (05/13/24 1327)  Pulse: 97 (05/13/24 1507)  Resp: 19 (05/13/24 1327)  BP: (!) 174/88 (05/13/24 1327)  SpO2: (!) 93 % (05/13/24 1327) Vital Signs (24h Range):  Temp:  [97.8 °F (36.6 °C)-98.6 °F (37 °C)] 98.2 °F (36.8 °C)  Pulse:  [81-97] 97  Resp:  [19-20] 19  SpO2:  [92 %-94 %] 93 %  BP: (143-176)/() 174/88     Weight: 68.8 kg (151 lb 10.8 oz)  Body mass index is 25.24 kg/m².    Estimated Creatinine Clearance: 95.2 mL/min (based on SCr of 0.7 mg/dL).     Physical Exam   Constitutional:       Appearance: Normal appearance.   HENT:      Head: Normocephalic and atraumatic.      Nose: Nose normal.      Mouth/Throat:      Mouth: Mucous membranes are moist.      Pharynx: Oropharynx is clear.   Eyes:      Conjunctiva/sclera: Conjunctivae normal.   Cardiovascular:      Rate and Rhythm: Normal rate and regular rhythm.      Pulses: Normal pulses.      Heart sounds: Normal heart sounds.   Pulmonary:      Effort: Respiratory distress present.      Breath sounds: Rales present.      Comments: O2  support  Abdominal:      General: Bowel sounds are normal.      Palpations: Abdomen is soft.      Tenderness: There is no guarding or rebound.   Musculoskeletal:         General: No deformity.      Cervical back: Neck supple.      Right lower leg: Edema present.      Left lower leg: Edema present.   Skin:     General: Skin is warm and dry.      Coloration: Skin is not jaundiced.      Findings: No rash.   Neurological:      Mental Status: He is alert and oriented to person, place, and time. Mental status is at baseline.   Significant Labs:   Microbiology Results (last 7 days)       Procedure Component Value Units Date/Time    AFB Culture & Smear [[id number]]     Order Status: Sent Specimen: Blood     Fungus culture [[id number]] Collected: 05/11/24 0451    Order Status: Completed Specimen: Sputum Updated: 05/13/24 1149     Fungus (Mycology) Culture Culture in progress    Fungus culture [[id number]] Collected: 05/09/24 1212    Order Status: Completed Specimen: Body Fluid from Pleural Fluid Updated: 05/13/24 1149     Fungus (Mycology) Culture Culture in progress    Blood culture (site 2) [[id number]] Collected: 05/08/24 2205    Order Status: Completed Specimen: Blood from Peripheral, Antecubital, Right Updated: 05/12/24 2312     Blood Culture, Routine No Growth to date      No Growth to date      No Growth to date      No Growth to date      No Growth to date    Narrative:      Site # 2, aerobic only    Blood culture [[id number]] Collected: 05/09/24 1745    Order Status: Completed Specimen: Blood from Peripheral, Antecubital, Left Updated: 05/12/24 2222     Blood Culture, Routine No Growth to date      No Growth to date      No Growth to date      No Growth to date    Blood culture [[id number]] Collected: 05/09/24 1745    Order Status: Completed Specimen: Blood from Peripheral, Antecubital, Right Updated: 05/12/24 2222     Blood Culture, Routine No Growth to date      No Growth to date      No Growth to date      No  Growth to date    Culture, body fluid - Bactec [[id number]] Collected: 05/09/24 1211    Order Status: Completed Specimen: Body Fluid from Thoracentesis Fluid Updated: 05/12/24 1812     Body Fluid Culture, Sterile No Growth to date      No Growth to date      No Growth to date      No Growth to date    AFB Culture & Smear [[id number]] Collected: 05/11/24 0451    Order Status: Completed Specimen: Sputum Updated: 05/12/24 0927     AFB Culture & Smear Culture in progress    AFB Culture & Smear [[id number]] Collected: 05/10/24 0940    Order Status: Completed Specimen: Sputum Updated: 05/11/24 2127     AFB Culture & Smear Culture in progress    Cryptococcal antigen [[id number]] Collected: 05/10/24 1406    Order Status: Completed Specimen: Blood, Venous Updated: 05/11/24 1247     Cryptococcal Ag, Blood Negative    Blood culture (site 1) [[id number]]  (Abnormal) Collected: 05/08/24 2205    Order Status: Completed Specimen: Blood from Peripheral, Antecubital, Right Updated: 05/11/24 1103     Blood Culture, Routine Gram stain aer bottle: Gram positive cocci in clusters resembling Staph      Results called to and read back by:Raven Dawson RN 05/09/2024  17:18      COAGULASE-NEGATIVE STAPHYLOCOCCUS SPECIES  Organism is a probable contaminant      Narrative:      Site # 1, aerobic and anaerobic    Culture, Respiratory with Gram Stain [[id number]] Collected: 05/09/24 1117    Order Status: Completed Specimen: Sputum, Expectorated Updated: 05/11/24 0938     Respiratory Culture Normal respiratory av      No S aureus or Pseudomonas isolated.     Gram Stain (Respiratory) <10 epithelial cells per low power field.     Gram Stain (Respiratory) No WBC's     Gram Stain (Respiratory) Rare Gram negative rods    AFB culture [[id number]] Collected: 05/09/24 1212    Order Status: Completed Specimen: Body Fluid from Pleural Fluid Updated: 05/10/24 2127     AFB Culture & Smear Culture in progress     AFB CULTURE STAIN No acid fast bacilli seen.     AFB Culture & Smear [[id number]] Collected: 05/09/24 1644    Order Status: Completed Specimen: Respiratory from Sputum, Expectorated Updated: 05/10/24 2127     AFB Culture & Smear Culture in progress     AFB CULTURE STAIN No acid fast bacilli seen.    AFB Culture & Smear [[id number]]     Order Status: Canceled Specimen: Respiratory from Sputum, Expectorated     AFB stain [[id number]] Collected: 05/09/24 1212    Order Status: Completed Specimen: Body Fluid from Pleural Fluid Updated: 05/09/24 1854     Direct Acid Fast No acid fast bacilli seen.    JORDAN prep [[id number]] Collected: 05/09/24 1212    Order Status: Completed Specimen: Body Fluid from Pleural Fluid Updated: 05/09/24 1828     KOH Prep No yeast or fungal elements seen    MRSA/SA Rapid ID by PCR from Blood culture [[id number]] Collected: 05/08/24 2205    Order Status: Completed Updated: 05/09/24 1812     Staph aureus ID by PCR Negative     Methicillin Resistant ID by PCR Negative    Narrative:      Site # 1, aerobic and anaerobic    Gram stain [[id number]] Collected: 05/09/24 1212    Order Status: Completed Specimen: Body Fluid from Pleural Fluid Updated: 05/09/24 1717     Gram Stain Result Rare WBC's      No organisms seen    AFB Culture & Smear [[id number]] Collected: 05/09/24 1644    Order Status: Canceled Specimen: Respiratory from Sputum, Expectorated     Aerobic culture [[id number]]     Order Status: Completed Specimen: Pleural Fluid     Culture, Anaerobe [[id number]]     Order Status: Completed Specimen: Body Fluid from Pleural Fluid     Culture, Anaerobe [[id number]]     Order Status: Canceled Specimen: Body Fluid from Pleural Fluid     Aerobic culture [[id number]]     Order Status: Canceled Specimen: Pleural Fluid           Recent Lab Results         05/13/24  0653        Baso # 0.07       Basophil % 0.6       Differential Method Automated       Eos # 0.6       Eos % 5.3       Gran # (ANC) 7.4       Gran % 67.0       Hematocrit 52.6        Hemoglobin 16.4       Immature Grans (Abs) 0.09  Comment: Mild elevation in immature granulocytes is non specific and   can be seen in a variety of conditions including stress response,   acute inflammation, trauma and pregnancy. Correlation with other   laboratory and clinical findings is essential.         Immature Granulocytes 0.8       Lymph # 1.5       Lymph % 13.6       MCH 28.1       MCHC 31.2       MCV 90       Mono # 1.4       Mono % 12.7       MPV 8.6       nRBC 0       Platelet Count 216       RBC 5.84       RDW 15.1       WBC 11.08               Significant Imaging: I have reviewed all pertinent imaging results/findings within the past 24 hours.

## 2024-05-13 NOTE — PLAN OF CARE
Problem: Adult Inpatient Plan of Care  Goal: Plan of Care Review  Outcome: Progressing  Goal: Patient-Specific Goal (Individualized)  Outcome: Progressing  Goal: Absence of Hospital-Acquired Illness or Injury  Outcome: Progressing  Goal: Optimal Comfort and Wellbeing  Outcome: Progressing  Goal: Readiness for Transition of Care  Outcome: Progressing     Problem: COPD (Chronic Obstructive Pulmonary Disease)  Goal: Optimal Chronic Illness Coping  Outcome: Progressing  Goal: Optimal Level of Functional [place]  Outcome: Progressing  Goal: Absence of Infection Signs and Symptoms  Outcome: Progressing  Goal: Improved Oral Intake  Outcome: Progressing  Goal: Effective Oxygenation and Ventilation  Outcome: Progressing     Problem: Infection  Goal: Absence of Infection Signs and Symptoms  Outcome: Progressing

## 2024-05-14 ENCOUNTER — TELEPHONE (OUTPATIENT)
Dept: PALLIATIVE MEDICINE | Facility: CLINIC | Age: 64
End: 2024-05-14
Payer: MEDICAID

## 2024-05-14 PROBLEM — C34.90 ADENOCARCINOMA, LUNG: Status: ACTIVE | Noted: 2024-05-14

## 2024-05-14 PROBLEM — Z51.5 PALLIATIVE CARE ENCOUNTER: Status: ACTIVE | Noted: 2024-05-14

## 2024-05-14 LAB
BACTERIA BLD CULT: NORMAL
BACTERIA BLD CULT: NORMAL
BACTERIA FLD CULT: NORMAL
FINAL PATHOLOGIC DIAGNOSIS: ABNORMAL
Lab: ABNORMAL
MICROSCOPIC EXAM: ABNORMAL

## 2024-05-14 PROCEDURE — 94668 MNPJ CHEST WALL SBSQ: CPT

## 2024-05-14 PROCEDURE — 94640 AIRWAY INHALATION TREATMENT: CPT

## 2024-05-14 PROCEDURE — 94761 N-INVAS EAR/PLS OXIMETRY MLT: CPT

## 2024-05-14 PROCEDURE — 27000221 HC OXYGEN, UP TO 24 HOURS

## 2024-05-14 PROCEDURE — 86481 TB AG RESPONSE T-CELL SUSP: CPT | Performed by: HOSPITALIST

## 2024-05-14 PROCEDURE — 25000242 PHARM REV CODE 250 ALT 637 W/ HCPCS

## 2024-05-14 PROCEDURE — 21400001 HC TELEMETRY ROOM

## 2024-05-14 PROCEDURE — 27000646 HC AEROBIKA DEVICE

## 2024-05-14 PROCEDURE — 25000003 PHARM REV CODE 250

## 2024-05-14 PROCEDURE — 36415 COLL VENOUS BLD VENIPUNCTURE: CPT | Performed by: HOSPITALIST

## 2024-05-14 PROCEDURE — 25000003 PHARM REV CODE 250: Performed by: HOSPITALIST

## 2024-05-14 PROCEDURE — 99900035 HC TECH TIME PER 15 MIN (STAT)

## 2024-05-14 PROCEDURE — 99497 ADVNCD CARE PLAN 30 MIN: CPT | Mod: 25,,, | Performed by: STUDENT IN AN ORGANIZED HEALTH CARE EDUCATION/TRAINING PROGRAM

## 2024-05-14 PROCEDURE — 99233 SBSQ HOSP IP/OBS HIGH 50: CPT | Mod: ,,, | Performed by: INTERNAL MEDICINE

## 2024-05-14 PROCEDURE — 25000003 PHARM REV CODE 250: Performed by: PHYSICIAN ASSISTANT

## 2024-05-14 PROCEDURE — 99223 1ST HOSP IP/OBS HIGH 75: CPT | Mod: ,,, | Performed by: HOSPITALIST

## 2024-05-14 PROCEDURE — 99223 1ST HOSP IP/OBS HIGH 75: CPT | Mod: ,,, | Performed by: STUDENT IN AN ORGANIZED HEALTH CARE EDUCATION/TRAINING PROGRAM

## 2024-05-14 PROCEDURE — 25000242 PHARM REV CODE 250 ALT 637 W/ HCPCS: Performed by: HOSPITALIST

## 2024-05-14 PROCEDURE — 94664 DEMO&/EVAL PT USE INHALER: CPT

## 2024-05-14 PROCEDURE — 63600175 PHARM REV CODE 636 W HCPCS

## 2024-05-14 RX ORDER — OXYCODONE AND ACETAMINOPHEN 5; 325 MG/1; MG/1
1 TABLET ORAL EVERY 12 HOURS PRN
Qty: 16 TABLET | Refills: 0 | Status: SHIPPED | OUTPATIENT
Start: 2024-05-14 | End: 2024-05-27 | Stop reason: HOSPADM

## 2024-05-14 RX ORDER — OXYCODONE AND ACETAMINOPHEN 5; 325 MG/1; MG/1
1 TABLET ORAL EVERY 8 HOURS PRN
Status: DISCONTINUED | OUTPATIENT
Start: 2024-05-14 | End: 2024-05-17

## 2024-05-14 RX ORDER — SULFAMETHOXAZOLE AND TRIMETHOPRIM 800; 160 MG/1; MG/1
1 TABLET ORAL
Qty: 15 TABLET | Refills: 11 | Status: SHIPPED | OUTPATIENT
Start: 2024-05-15

## 2024-05-14 RX ORDER — SULFAMETHOXAZOLE AND TRIMETHOPRIM 800; 160 MG/1; MG/1
1 TABLET ORAL
Status: DISCONTINUED | OUTPATIENT
Start: 2024-05-15 | End: 2024-05-29 | Stop reason: HOSPADM

## 2024-05-14 RX ORDER — ELVITEGRAVIR, COBICISTAT, EMTRICITABINE, AND TENOFOVIR ALAFENAMIDE 150; 150; 200; 10 MG/1; MG/1; MG/1; MG/1
1 TABLET ORAL DAILY
Qty: 30 TABLET | Refills: 0 | Status: SHIPPED | OUTPATIENT
Start: 2024-05-14 | End: 2024-05-15

## 2024-05-14 RX ADMIN — CEFTRIAXONE 2 G: 2 INJECTION, POWDER, FOR SOLUTION INTRAMUSCULAR; INTRAVENOUS at 09:05

## 2024-05-14 RX ADMIN — SODIUM CHLORIDE SOLN NEBU 3% 4 ML: 3 NEBU SOLN at 08:05

## 2024-05-14 RX ADMIN — SODIUM CHLORIDE SOLN NEBU 3% 4 ML: 3 NEBU SOLN at 12:05

## 2024-05-14 RX ADMIN — MELATONIN TAB 3 MG 6 MG: 3 TAB at 08:05

## 2024-05-14 RX ADMIN — GUAIFENESIN 1200 MG: 600 TABLET, EXTENDED RELEASE ORAL at 09:05

## 2024-05-14 RX ADMIN — BENZONATATE 100 MG: 100 CAPSULE ORAL at 09:05

## 2024-05-14 RX ADMIN — SODIUM CHLORIDE SOLN NEBU 3% 4 ML: 3 NEBU SOLN at 04:05

## 2024-05-14 RX ADMIN — BENZONATATE 100 MG: 100 CAPSULE ORAL at 08:05

## 2024-05-14 RX ADMIN — IPRATROPIUM BROMIDE AND ALBUTEROL SULFATE 3 ML: 2.5; .5 SOLUTION RESPIRATORY (INHALATION) at 09:05

## 2024-05-14 RX ADMIN — IPRATROPIUM BROMIDE AND ALBUTEROL SULFATE 3 ML: 2.5; .5 SOLUTION RESPIRATORY (INHALATION) at 08:05

## 2024-05-14 RX ADMIN — HYDROXYZINE HYDROCHLORIDE 10 MG: 10 TABLET ORAL at 10:05

## 2024-05-14 RX ADMIN — BENZONATATE 100 MG: 100 CAPSULE ORAL at 05:05
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
## 2024-05-14 NOTE — ASSESSMENT & PLAN NOTE
AIDS secondary to CD4 <200. Not on ART prior to admission. Willing to start  Patient willing to re-establish care with Dr Rosa, confirmed appointment at [facility] on June 4th 2024 at 8 am, patient was informed and agreed to make this appointment.

## 2024-05-14 NOTE — ASSESSMENT & PLAN NOTE
64 y.o. male with a PMHx of cocaine abuse, HIV not on HAART, tobacco abuse, HTN who present to [facility] for SOB of at least two weeks, endorsed crack cocaine use 2 days prior. Presentation notable for hypoxia, CT showed L apex spiculated nodule, RML GGO changes, multiple small b/l bnodules and large L effusion s/p thoracentesis by pulmonary done this admission. He remains afebrile, HDS. HIV undetectable and CD4 11%, non adherence since 2020 reported.     Infectious workup so far has returned negative: Fungitell, PJP, ADA and PJP sputum, aspergillus antigen and crypto Ag negative. Pleural cx with NG. 3 AFB stains negative, AFB culture incubating.     Cytology revealed adenocarcinoma of lung origin.     Recommendations    -Oncology consult for recommendations regarding adenocarcinoma prognosis and management.     -if no current or immediate chemotherapy plans, Genvoya can be given for 30 days to cover the patient until his HIV ID outpatient appointment  -if chemotherapy or immunotherapy is planned, would rather discharge patient on Biktarvy for 30 days to cover the patient until his HIV ID outpatient appointment  -s/p abx course for CAP, no further antibiotics needed at this time unless AFB returns positive which would take several weeks, follow up T spot. TB is low on diff and unlikely given current diagnosis.

## 2024-05-14 NOTE — ASSESSMENT & PLAN NOTE
Patient with extensive smoking history presented with progressively worsening SOB for the last couple of weeks. Imaging revealed lung masses, a large left pleural effusion, and liver lesions. Patient had a thora which revealed adenocarcinoma of lung origin. Spoke with patient about his diagnosis of metastatic cancer. Told him this is not curable but there are treatments available. Before this admission he was able to get around his house on his own but was very SOB and his lung hurt. He lives in [place] with his daughter.     - Patients workup consistent with metastatic adenocarcinoma, likely lung in origin   - Patient discharging today, can continue his workup outpatient (possible NGS on peripheral blood)  - He had a headache a couple nights ago, may need head imaging outpatient if it happens again   - Arranged an appointment later this month with a lung oncologist   - Recommend referral to palliative care given metastatic disease   - Patient has HIV and is not on any treatment, concerned about him coming to all of his appointments and treatments

## 2024-05-14 NOTE — PLAN OF CARE
"SW spoke to pt at the bedside regarding DISPO plans. Pt stated that he does not have anywhere to go at [place]. He reported that he can not go back by his daughter due to her housing voucher expiring and is seeking new placement. Pt reported that he does not have any other family/friends that can take him in. SW offered shelter resources and pt declined stating that he does not want to go there due to his current medical status of needed home O2. Pt does not qualify for SNF placement. Pt stated that he will contact his sister to see if she can assist. SW will follow up as needed.       3:30 PM SW followed up with pt at the bedside to see if he was able to speak with his sister for assistance. Pt stated that he will call her when she get off, SW asked pt does he know a time he will contact her, he replied and said "I don't know".      4:00 PM With pt's permission, SW contacted pt's sister who confirms that pt is homeless and has no way of helping him due to her and their mother living in [place]. She confirmed that pt has put himself in this situation with no other support. SW faxed pt's information to [employer] for screening and escalated situation to leadership for assistance.     Holly Moser, BECKY  [facility]  Ext. [phone]    "

## 2024-05-14 NOTE — ASSESSMENT & PLAN NOTE
64m with untreated HIV and tobacco abuse who has new diagnosis of advanced lung adenocarcinoma with likely liver and spine mets. Likely weight loss ~25lbs since 2020 per outside records.    -plans to establish with oncology, appt with Dr Camacho 5/22

## 2024-05-14 NOTE — PLAN OF CARE
Thoracentesis pathology results returned today positive for adenocarcinoma likely lung primary. Recommend oncology and palliative care consultation.     Lucinda Farley MD PGY VI  [employer] Pulmonary/Critical Care Fellow

## 2024-05-14 NOTE — HPI
Mr Oscar Waldron is a 64 year old male with HIV (not on ART), tobacco abuse and new lung mass associated with liver and spinal lesions who presented to [facility] on 5/8 for chest pains, dyspnea. Patient underwent thoracentesis with pulmonology and pathology revealed lung adenocarcinoma. Palliative care consulted for goals of care.

## 2024-05-14 NOTE — HPI
Mr. Oscar Waldron is a 64 y.o. male with HIV (not on HAART), tobacco use (>40 years), and HTN who is admitted with SOB. States he has had progressively worsening SOB for the last couple of weeks. He had a CT chest ordered which revealed a spiculated lung nodule in the left lung apex (1.4 x1.8cm), other bilateral pulmonary nodules, and large left pleural effusion. CT abdomen revealed a few hypodense liver lesions concerning for mets. Patient had a thora performed and cytology revealed adenocarcinoma, likely lung origin.

## 2024-05-14 NOTE — SUBJECTIVE & OBJECTIVE
Oncology Treatment Plan:   [No matching plan found]    Medications:  Continuous Infusions:  Scheduled Meds:   benzonatate  100 mg Oral TID    cefTRIAXone (Rocephin) IV (PEDS and ADULTS)  2 g Intravenous Q24H    guaiFENesin  1,200 mg Oral BID    sodium chloride 3%  4 mL Nebulization Q8H     PRN Meds:  Current Facility-Administered Medications:     acetaminophen, 650 mg, Oral, Q4H PRN    albuterol-ipratropium, 3 mL, Nebulization, Q6H PRN    bisacodyL, 10 mg, Rectal, Daily PRN    dextrose 10%, 12.5 g, Intravenous, PRN    dextrose 10%, 25 g, Intravenous, PRN    glucagon (human recombinant), 1 mg, Intramuscular, PRN    glucose, 16 g, Oral, PRN    glucose, 24 g, Oral, PRN    hydrOXYzine HCL, 10 mg, Oral, TID PRN    iohexol, 15 mL, Oral, PRN    melatonin, 6 mg, Oral, Nightly PRN    ondansetron, 8 mg, Oral, Q8H PRN    polyethylene glycol, 17 g, Oral, Daily PRN    promethazine, 25 mg, Oral, Q6H PRN     Review of patient's allergies indicates:  No Known Allergies     No past medical history on file.  No past surgical history on file.  Family History    None       Tobacco Use    Smoking status: Not on file    Smokeless tobacco: Not on file   Substance and Sexual Activity    Alcohol use: Not on file    Drug use: Not on file    Sexual activity: Not on file       Review of Systems   Constitutional:  Positive for fatigue. Negative for chills and fever.   HENT:  Negative for congestion and sore throat.    Eyes:  Negative for pain.   Respiratory:  Positive for cough and shortness of breath.    Cardiovascular:  Negative for chest pain, palpitations and leg swelling.   Gastrointestinal:  Negative for abdominal pain, constipation, diarrhea, nausea and vomiting.   Genitourinary:  Negative for difficulty urinating, dysuria and hematuria.   Musculoskeletal:  Negative for back pain.   Skin:  Negative for rash.   Neurological:  Negative for light-headedness and headaches.   Hematological:  Does not bruise/bleed easily.    Psychiatric/Behavioral:  Negative for agitation.      Objective:     Vital Signs (Most Recent):  Temp: 98 °F (36.7 °C) (05/14/24 0821)  Pulse: 83 (05/14/24 1103)  Resp: 18 (05/14/24 0821)  BP: 131/69 (05/14/24 0821)  SpO2: (!) 94 % (05/14/24 0821) Vital Signs (24h Range):  Temp:  [97.5 °F (36.4 °C)-98.8 °F (37.1 °C)] 98 °F (36.7 °C)  Pulse:  [75-97] 83  Resp:  [16-20] 18  SpO2:  [92 %-95 %] 94 %  BP: (131-174)/(66-96) 131/69     Weight: 68.8 kg (151 lb 10.8 oz)  Body mass index is 25.24 kg/m².  Body surface area is 1.78 meters squared.    No intake or output data in the 24 hours ending 05/14/24 1116     Physical Exam  Constitutional:       Appearance: Normal appearance. He is ill-appearing.   HENT:      Head: Normocephalic and atraumatic.      Mouth/Throat:      Mouth: Mucous membranes are moist.      Pharynx: Oropharynx is clear.   Eyes:      Extraocular Movements: Extraocular movements intact.      Pupils: Pupils are equal, round, and reactive to light.   Cardiovascular:      Rate and Rhythm: Normal rate and regular rhythm.      Pulses: Normal pulses.      Heart sounds: Normal heart sounds.   Pulmonary:      Effort: Pulmonary effort is normal.      Comments: Decreased breath sounds on left side  Abdominal:      General: Abdomen is flat.      Palpations: Abdomen is soft.      Tenderness: There is no abdominal tenderness.   Musculoskeletal:         General: Normal range of motion.      Cervical back: Normal range of motion and neck supple.      Right lower leg: No edema.      Left lower leg: No edema.   Skin:     General: Skin is warm and dry.   Neurological:      General: No focal deficit present.      Mental Status: He is alert and oriented to person, place, and time.   Psychiatric:         Mood and Affect: Mood normal.         Behavior: Behavior normal.          Significant Labs:   All pertinent labs from the last 24 hours have been reviewed.    Diagnostic Results:  I have reviewed all pertinent imaging  results/findings within the past 24 hours.

## 2024-05-14 NOTE — PLAN OF CARE
Problem: Adult Inpatient Plan of Care  Goal: Plan of Care Review  Outcome: Progressing  Goal: Patient-Specific Goal (Individualized)  Outcome: Progressing  Goal: Absence of Hospital-Acquired Illness or Injury  Outcome: Progressing  Goal: Optimal Comfort and Wellbeing  Outcome: Progressing  Goal: Readiness for Transition of Care  Outcome: Progressing     Problem: COPD (Chronic Obstructive Pulmonary Disease)  Goal: Optimal Chronic Illness Coping  Outcome: Progressing  Goal: Optimal Level of Functional [place]  Outcome: Progressing  Goal: Absence of Infection Signs and Symptoms  Outcome: Progressing  Goal: Improved Oral Intake  Outcome: Progressing  Goal: Effective Oxygenation and Ventilation  Outcome: Progressing     Problem: Infection  Goal: Absence of Infection Signs and Symptoms  Outcome: Progressing

## 2024-05-14 NOTE — CONSULTS
[facility]  Hematology/Oncology  Consult Note    Patient Name: Oscar Waldron  MRN: [id number]  Admission Date: 5/8/2024  Hospital Length of Stay: 5 days  Code Status: Full Code   Attending Provider: Gail Jacome MD  Consulting Provider: Leon Chiu MD  Primary Care Physician: Kenna, Primary Doctor  Principal Problem:Acute respiratory failure with hypoxia    Inpatient consult to Hematology/Oncology  Consult performed by: Leon Chiu MD  Consult ordered by: Gail Jacome MD        Subjective:     HPI:  Mr. Oscar Waldron is a 64 y.o. male with HIV (not on HAART), tobacco use (>40 years), and HTN who is admitted with SOB. States he has had progressively worsening SOB for the last couple of weeks. He had a CT chest ordered which revealed a spiculated lung nodule in the left lung apex (1.4 x1.8cm), other bilateral pulmonary nodules, and large left pleural effusion. CT abdomen revealed a few hypodense liver lesions concerning for mets. Patient had a thora performed and cytology revealed adenocarcinoma, likely lung origin.    Oncology Treatment Plan:   [No matching plan found]    Medications:  Continuous Infusions:  Scheduled Meds:   benzonatate  100 mg Oral TID    cefTRIAXone (Rocephin) IV (PEDS and ADULTS)  2 g Intravenous Q24H    guaiFENesin  1,200 mg Oral BID    sodium chloride 3%  4 mL Nebulization Q8H     PRN Meds:  Current Facility-Administered Medications:     acetaminophen, 650 mg, Oral, Q4H PRN    albuterol-ipratropium, 3 mL, Nebulization, Q6H PRN    bisacodyL, 10 mg, Rectal, Daily PRN    dextrose 10%, 12.5 g, Intravenous, PRN    dextrose 10%, 25 g, Intravenous, PRN    glucagon (human recombinant), 1 mg, Intramuscular, PRN    glucose, 16 g, Oral, PRN    glucose, 24 g, Oral, PRN    hydrOXYzine HCL, 10 mg, Oral, TID PRN    iohexol, 15 mL, Oral, PRN    melatonin, 6 mg, Oral, Nightly PRN    ondansetron, 8 mg, Oral, Q8H PRN    polyethylene glycol, 17 g, Oral, Daily PRN    promethazine, 25 mg, Oral,  Q6H PRN     Review of patient's allergies indicates:  No Known Allergies     No past medical history on file.  No past surgical history on file.  Family History    None       Tobacco Use    Smoking status: Not on file    Smokeless tobacco: Not on file   Substance and Sexual Activity    Alcohol use: Not on file    Drug use: Not on file    Sexual activity: Not on file       Review of Systems   Constitutional:  Positive for fatigue. Negative for chills and fever.   HENT:  Negative for congestion and sore throat.    Eyes:  Negative for pain.   Respiratory:  Positive for cough and shortness of breath.    Cardiovascular:  Negative for chest pain, palpitations and leg swelling.   Gastrointestinal:  Negative for abdominal pain, constipation, diarrhea, nausea and vomiting.   Genitourinary:  Negative for difficulty urinating, dysuria and hematuria.   Musculoskeletal:  Negative for back pain.   Skin:  Negative for rash.   Neurological:  Negative for light-headedness and headaches.   Hematological:  Does not bruise/bleed easily.   Psychiatric/Behavioral:  Negative for agitation.      Objective:     Vital Signs (Most Recent):  Temp: 98 °F (36.7 °C) (05/14/24 0821)  Pulse: 83 (05/14/24 1103)  Resp: 18 (05/14/24 0821)  BP: 131/69 (05/14/24 0821)  SpO2: (!) 94 % (05/14/24 0821) Vital Signs (24h Range):  Temp:  [97.5 °F (36.4 °C)-98.8 °F (37.1 °C)] 98 °F (36.7 °C)  Pulse:  [75-97] 83  Resp:  [16-20] 18  SpO2:  [92 %-95 %] 94 %  BP: (131-174)/(66-96) 131/69     Weight: 68.8 kg (151 lb 10.8 oz)  Body mass index is 25.24 kg/m².  Body surface area is 1.78 meters squared.    No intake or output data in the 24 hours ending 05/14/24 1116     Physical Exam  Constitutional:       Appearance: Normal appearance. He is ill-appearing.   HENT:      Head: Normocephalic and atraumatic.      Mouth/Throat:      Mouth: Mucous membranes are moist.      Pharynx: Oropharynx is clear.   Eyes:      Extraocular Movements: Extraocular movements intact.       Pupils: Pupils are equal, round, and reactive to light.   Cardiovascular:      Rate and Rhythm: Normal rate and regular rhythm.      Pulses: Normal pulses.      Heart sounds: Normal heart sounds.   Pulmonary:      Effort: Pulmonary effort is normal.      Comments: Decreased breath sounds on left side  Abdominal:      General: Abdomen is flat.      Palpations: Abdomen is soft.      Tenderness: There is no abdominal tenderness.   Musculoskeletal:         General: Normal range of motion.      Cervical back: Normal range of motion and neck supple.      Right lower leg: No edema.      Left lower leg: No edema.   Skin:     General: Skin is warm and dry.   Neurological:      General: No focal deficit present.      Mental Status: He is alert and oriented to person, place, and time.   Psychiatric:         Mood and Affect: Mood normal.         Behavior: Behavior normal.          Significant Labs:   All pertinent labs from the last 24 hours have been reviewed.    Diagnostic Results:  I have reviewed all pertinent imaging results/findings within the past 24 hours.  Assessment/Plan:     Adenocarcinoma, lung  Patient with extensive smoking history presented with progressively worsening SOB for the last couple of weeks. Imaging revealed lung masses, a large left pleural effusion, and liver lesions. Patient had a thora which revealed adenocarcinoma of lung origin. Spoke with patient about his diagnosis of metastatic cancer. Told him this is not curable but there are treatments available. Before this admission he was able to get around his house on his own but was very SOB and his lung hurt. He lives in [place] with his daughter.     - Patients workup consistent with metastatic adenocarcinoma, likely lung in origin   - Patient discharging today, can continue his workup outpatient (possible NGS on peripheral blood)  - He had a headache a couple nights ago, may need head imaging outpatient if it happens again   - Arranged an  appointment later this month with a lung oncologist   - Recommend referral to palliative care given metastatic disease   - Patient has HIV and is not on any treatment, concerned about him coming to all of his appointments and treatments        Thank you for your consult. I will sign off. Please contact us if you have any additional questions.    Leon Chiu MD  Hematology/Oncology  [facility]

## 2024-05-14 NOTE — PROGRESS NOTES
[facility]  Infectious Disease  Progress Note    Patient Name: Oscar Waldron  MRN: [id number]  Admission Date: 5/8/2024  Length of Stay: 5 days  Attending Physician: Gail Jacome MD  Primary Care Provider: No, Primary Doctor    Isolation Status: No active isolations  Assessment/Plan:      Pulmonary  * Acute respiratory failure with hypoxia  64 y.o. male with a PMHx of cocaine abuse, HIV not on HAART, tobacco abuse, HTN who present to [facility] for [place]B of at least two weeks, endorsed crack cocaine use 2 days prior. Presentation notable for hypoxia, CT showed L apex spiculated nodule, RML GGO changes, multiple small b/l bnodules and large L effusion s/p thoracentesis by pulmonary done this admission. He remains afebrile, HDS. HIV undetectable and CD4 11%, non adherence since 2020 reported.     Infectious workup so far has returned negative: Fungitell, PJP, ADA and PJP sputum, aspergillus antigen and crypto Ag negative. Pleural cx with NG. 3 AFB stains negative, AFB culture incubating.     Cytology revealed adenocarcinoma of lung origin. S/p oncology evaluation, he has been setup for lung oncologist outpatient appt later this month.     Recommendations    -Oncology consult for recommendations regarding adenocarcinoma prognosis and management.     --DC patient with ART for 30 days to cover the patient until his HIV ID outpatient appointment. Bactrim for OI Ppx at least 3x weekly    -s/p abx course for CAP, no further antibiotics needed at this time unless AFB returns positive which would take several weeks, follow up T spot. TB is low on diff and unlikely given current diagnosis.       ID  AIDS (acquired immunodeficiency syndrome), CD4 <=200  AIDS secondary to CD4 <200. Not on ART prior to admission. Willing to start  Patient willing to re-establish care with Dr Rosa, confirmed appointment at [facility] clinic on June 4th 2024 at 8 am, patient was informed and agreed to make this appointment.         Anticipated  Disposition: TBD    Thank you for your consult. I will sign off. Please contact us if you have any additional questions.    Luc Shaffer MD  Infectious Disease  [facility] - Observation [facility]    Subjective:     Principal Problem:Acute respiratory failure with hypoxia    HPI: 64 y.o. male with a PMHx of cocaine abuse, HIV not on HAART, tobacco abuse, HTN who [place] [facility] [place], endorsed crack cocaine use 2 days prior.     HIV hx: Chart documentation shows he was last seen by Vy Rosa MD back in 5/2020, VL undetectable 1/2019 with CD4 26% at that time, but high concern for non adherence, was missing 2-3 doses weekly while on Genvoya. He remains afebrile, no significant WBC change. Started on azithromycin and ceftriaxone, along with steroids. HDS. S/p thoracentesis by pulmonary at bedside, fluid studies and cultures pending.     CT chest showed a spiculated nodule L apex, GGO RML, and b/l irregular small nodules, mod to large left pleural effusion, worrisome for metastatic process. Patient tells us that he was doing okay prior to onset of symptoms but then suddenly developed acute onset shortness of breath with progressive worsening, some congestion, occasional productive cough which is dry most of the time but sometimes notable for brown sputum.  He denies hemoptysis.  Denies fevers, night sweats, rigors, but states he occasionally will have chills.  He denies sick contacts, exotic animals, exotic hobbies, or recent travel.  He states he has been off HIV medication since the COVID pandemic.  He denies prior intolerance to Genvoya and states he tolerated medication well.  He has a 40 pack-year smoking history and also admits to smoking crack pipe 2 days prior to admission.  He states that he quit tobacco a few weeks ago.  He denies diarrhea, recent trauma, new skin ulcers or lesions, pleuritic pain, prior oxygen dependence.  Denies antibiotic allergies.  Rai known exposure to  persons with tuberculosis.ID consulted for HIV not on HAART.           No new subjective & objective note has been filed under this hospital service since the last note was generated.

## 2024-05-14 NOTE — PROGRESS NOTES
[facility] Hospital Medicine  Progress Note    Primary Team: [facility] HOSP MED O  Admit Date: 5/8/2024    Subjective:      HPI:  Oscar Waldron is a 64 y.o. male with a PMHx of cocaine abuse, HIV not on HAART, tobacco abuse, HTN who present to [facility] for evaluation of shortness of breath. Patient reports progressive worsening of shortness of breath over the past few weeks. He now has a  productive cough with associated left sided chest/ left upper abdomen pain which only occurs during coughing fits. Shortness of breath worsens while lying flat and with minimal exertion. No improvement with albuterol inhaler that he borrowed from a family member. Admits to smoking crack cocaine 2 days ago. Has >40 year hx of tobacco abuse. He stopped taking his HIV medications a few years ago.       ED: tachycardic to , improved without intervention. Satting 96% on 3L NC. BNP <10, trop 0.010. EKG with non-specifically TWAs. CXR with cardiomegaly with pulmonary vascular congestion and bilateral interstitial opacities, moderate left-sided pleural effusion with associated compressive atelectasis. Given solumedrol 125mg, azithro and rocephin.      Overview/Hospital Course:  Oscar Waldron is placed in  Observation for management of acute respiratory failure with hypoxia. Requiring supplemental oxygen on admit. On IV rocephin and azithromycin. CT chest reviewed, concern for malignant/metastatic process vs infectious process. Pulmonology and ID consulted. S/p thoracentesis for L pleural effusion 5/9 with 950 mL removed & showing exudative process. Infectious workup so far negative. Pleural fluid cytology showing adenocarcinoma. Oncology consulted and have set an oncology appt on 5/22. ID is also setting him up in HOP clinic.      Interval History:  Pleural fluid cytology showing lung adenocarcinoma.  Oncology consulted and have set an oncology appt on 5/22. ID is also setting him up in HOP clinic. Palliative Care visited  with patient as well. Starting Percocet prn pain. Discharge delayed due to housing situation.    ROS:  As per HPI  General: no fever, no chills, no weight loss, no fatigue  Cardiovascular: no chest pain, no orthopnea  Respiratory: + cough  All other systems reviewed & are negative.     No past medical history on file.  No past surgical history on file.      Objective:   Last 24 Hour Vital Signs:  BP  Min: 131/69  Max: 142/96  Temp  Av.1 °F (36.7 °C)  Min: 97.5 °F (36.4 °C)  Max: 98.8 °F (37.1 °C)  Pulse  Av.6  Min: 75  Max: 97  Resp  Av.8  Min: 16  Max: 20  SpO2  Av.9 %  Min: 92 %  Max: 95 %  No intake/output data recorded.    Physical Examination:  GEN: AAOx3, NAD  HEENT: NCAT, MMM, PERRL, EOMI, oropharynx clear  CV: RRR, no m/r, no S3/S4  RESP: CTAB, no wheezes/crackles, no increased WOB, on 2L NC  ABD: soft, NTND, normoactive BS, no organomegaly  EXTR: no c/c/e, intact distal pulses x 4  NEURO: PERRL, EOMI, moving all four extremities, intact sensation to light touch, no focal deficits  SKIN: no rashes, lesions, or color changes  PSYCH: normal affect    Laboratory:  I have reviewed all pertinent lab results/findings within the past 24 hours.    Radiology:  I have reviewed all pertinent imaging results/findings within the past 24 hours.    Current Medications:     Infusions:       Scheduled:   benzonatate  100 mg Oral TID    cefTRIAXone (Rocephin) IV (PEDS and ADULTS)  2 g Intravenous Q24H    guaiFENesin  1,200 mg Oral BID    sodium chloride 3%  4 mL Nebulization Q8H    [START ON 5/15/2024] sulfamethoxazole-trimethoprim 800-160mg  1 tablet Oral Every Mon, Wed, Fri        PRN:    Current Facility-Administered Medications:     acetaminophen, 650 mg, Oral, Q4H PRN    albuterol-ipratropium, 3 mL, Nebulization, Q6H PRN    bisacodyL, 10 mg, Rectal, Daily PRN    dextrose 10%, 12.5 g, Intravenous, PRN    dextrose 10%, 25 g, Intravenous, PRN    glucagon (human recombinant), 1 mg, Intramuscular, PRN     glucose, 16 g, Oral, PRN    glucose, 24 g, Oral, PRN    hydrOXYzine HCL, 10 mg, Oral, TID PRN    iohexol, 15 mL, Oral, PRN    melatonin, 6 mg, Oral, Nightly PRN    ondansetron, 8 mg, Oral, Q8H PRN    oxyCODONE-acetaminophen, 1 tablet, Oral, Q8H PRN    polyethylene glycol, 17 g, Oral, Daily PRN    promethazine, 25 mg, Oral, Q6H PRN    Prior records reviewed.       Assessment/Plan:     Oscar Waldron is a 64 y.o.male with    Acute respiratory failure with hypoxia  Abnormal CT of lung  Metastatic lung adenocarcinoma  Left pleural effusion  - suspect has undiagnosed COPD with acute exacerbation, large L pleural effusion contributing to symptoms  - satting 96% on 2L NC  - s/p solumedrol 125mg IVP; cont prednisone  - rocephin/azithro  - Duonebs changed to Mucomyst to assist with sputum production   - Acapella and CPT ordered  - pulm & ID consulted  - thora 5/9 with 950 ml removed which was stopped 2/2 chest pain; pleural fluid studies suggestive of exudative process  - sputum cx - normal respiratory av  - mTB PCR negative  - PJP PCP negative  - aspergillus antigen negative  - fungitell negative  - crypto antigen negative  - AFB smear negative x 3. Follow AFB cultures  - pleural fluid cultures negative  - pleural fluid cytology - lung adenocarcinoma  - imaging shows concern for mets to liver, spine  - oncology consulted -  OP f/u on 5/22  - palliative care consulted  - airborne precautions     Hypophosphatemia  - phos 1.5, replaced  - daily phos     High cholesterol  - not on statin  - lipid panel reviewed     HTN (hypertension)  - not on home meds  - controlled currently     Human immunodeficiency virus (HIV) disease  AIDS  - non-adherent to ART; off since 2020  - CD4 115  - ID consulted: ok to start his previous ART (undetectable VL); Bactrim for ppx  - f/u with Dr Rosa at [facility]         Gail Araujo MD  Hospital Medicine Staff  [facility]

## 2024-05-14 NOTE — SUBJECTIVE & OBJECTIVE
"Interval History: Chart reviewed including 24h medication use. Patient pacing around room without NC in place. No family present during visit. Patient awake and conversant but visibly frustrated, noting "you tell me I'm dying from cancer and then try to kick me out".     Palliative ROS:  PRNs:  Pain + (chest discomfort, constant aching pain, worse with deep breathing)  Dyspnea +  Nausea -  Anxiety -  Agitation -      No past medical history on file.    No past surgical history on file.    Review of patient's allergies indicates:  No Known Allergies    Medications:  Continuous Infusions:  Scheduled Meds:   benzonatate  100 mg Oral TID    guaiFENesin  1,200 mg Oral BID    sodium chloride 3%  4 mL Nebulization Q8H    [START ON 5/15/2024] sulfamethoxazole-trimethoprim 800-160mg  1 tablet Oral Every Mon, Wed, Fri     PRN Meds:  Current Facility-Administered Medications:     acetaminophen, 650 mg, Oral, Q4H PRN    albuterol-ipratropium, 3 mL, Nebulization, Q6H PRN    bisacodyL, 10 mg, Rectal, Daily PRN    dextrose 10%, 12.5 g, Intravenous, PRN    dextrose 10%, 25 g, Intravenous, PRN    glucagon (human recombinant), 1 mg, Intramuscular, PRN    glucose, 16 g, Oral, PRN    glucose, 24 g, Oral, PRN    hydrOXYzine HCL, 10 mg, Oral, TID PRN    iohexol, 15 mL, Oral, PRN    melatonin, 6 mg, Oral, Nightly PRN    ondansetron, 8 mg, Oral, Q8H PRN    oxyCODONE-acetaminophen, 1 tablet, Oral, Q8H PRN    polyethylene glycol, 17 g, Oral, Daily PRN    promethazine, 25 mg, Oral, Q6H PRN    Family History    None       Tobacco Use    Smoking status: Not on file    Smokeless tobacco: Not on file   Substance and Sexual Activity    Alcohol use: Not on file    Drug use: Not on file    Sexual activity: Not on file         Objective:     Vital Signs (Most Recent):  Temp: 98.2 °F (36.8 °C) (05/14/24 1625)  Pulse: 91 (05/14/24 1625)  Resp: 18 (05/14/24 1625)  BP: 135/80 (05/14/24 1625)  SpO2: 95 % (05/14/24 1625) Vital Signs (24h Range):  Temp:  " [97.5 °F (36.4 °C)-98.8 °F (37.1 °C)] 98.2 °F (36.8 °C)  Pulse:  [75-97] 91  Resp:  [16-20] 18  SpO2:  [92 %-95 %] 95 %  BP: (131-142)/(66-96) 135/80     Weight: 68.8 kg (151 lb 10.8 oz)  Body mass index is 25.24 kg/m².       Physical Exam  Vitals and nursing note reviewed.   Constitutional:       Comments: Older, thin, ill-appearing male ambulating in room, conversant; no observed pain behaviors    Eyes:      Extraocular Movements: Extraocular movements intact.   Pulmonary:      Comments: Some dyspnea on exertion, able to speak in full sentences  Abdominal:      General: Abdomen is flat.      Palpations: Abdomen is soft.   Skin:     General: Skin is warm and dry.   Neurological:      General: No focal deficit present.      Mental Status: He is oriented to person, place, and time. Mental status is at baseline.   Psychiatric:         Mood and Affect: Mood normal.         Behavior: Behavior normal.         Thought Content: Thought content normal.         Judgment: Judgment normal.            Advance Care Planning   Advance Directives:     Decision Making:  Patient answered questions  Goals of Care: Engaged patient at bedside in voluntary discussion regarding his wishes and values in the setting of severe life limiting illness in the form of advanced metastatic lung cancer. I introduced the philosophy and overall objectives of palliative medicine and assisting patients with serious terminal illness. Patient with fair insight understanding that is cancer is not terrible, but is treatable. At this time, he remains very clear that he would, except any meaningful life prolonging treatment options, but he also understands that at some point treatments will no longer be as effective and could even make him sick or rather than better. When asked what his wishes were be when that time comes, he shares that he unsure. He further ad that he doesn't see any point in discussing those things, and that he will worry about that when  "the time comes. I emphasized the importance of knowing his wishes and values and continuing to think about these things as his course unfolds. He does share that he would want his sister, Ruby, his decision-maker if he were ever incapacitated. he worries, most about his socioeconomic barriers to receiving the care he needs, specifically, his lack of transportation and recent loss of housing. I shared that we would engage our social workers to see what options we have in order to ensure the best possibility that he can get the care that he needs to have the outcome that we hope for. All other concerns and questions answered at this time.            CBC:   Recent Labs   Lab 05/13/24  0653   WBC 11.08   HGB 16.4   HCT 52.6   MCV 90        BMP:  No results for input(s): "GLU", "NA", "K", "CL", "CO2", "BUN", "CREATININE", "CALCIUM", "MG" in the last 24 hours.  LFT:  Lab Results   Component Value Date    AST 27 05/08/2024    ALKPHOS 98 05/08/2024    BILITOT 0.2 05/08/2024     Albumin:   Albumin   Date Value Ref Range Status   05/08/2024 2.9 (L) 3.5 - 5.2 g/dL Final     Protein:   Total Protein   Date Value Ref Range Status   05/08/2024 6.9 6.0 - 8.4 g/dL Final     Lactic acid:   No results found for: "LACTATE"    Reviewed CBC with stable blood counts, CT chest and AP with RUL mass and likely widely metastatic disease to liver and spine    "

## 2024-05-14 NOTE — NURSING
Home Oxygen Evaluation    Date Performed: 2024    1) Patient's Home O2 Sat on room air, while at rest: 92%        If O2 sats on room air at rest are 88% or below, patient qualifies. No additional testing needed. Document N/A in steps 2 and 3. If 89% or above, complete steps 2.      2) Patient's O2 Sat on room air while exercisin%        If O2 sats on room air while exercising remain 89% or above patient does not qualify, no further testing needed Document N/A in step 3. If O2 sats on room air while exercising are 88% or below, continue to step 3.      3) Patient's O2 Sat while exercising on O2: 93% at 2 LPM         (Must show improvement from #2 for patients to qualify)    If O2 sats improve on oxygen, patient qualifies for portable oxygen. If not, the patient does not qualify.
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
## 2024-05-15 DIAGNOSIS — C34.92 ADENOCARCINOMA OF LEFT LUNG: Primary | ICD-10-CM

## 2024-05-15 LAB
ASPERGILLUS AB SER QL ID: NOT DETECTED
B DERMAT AB SER QL ID: NOT DETECTED
C IMMITIS AB SER QL ID: NOT DETECTED
H CAPSUL AB SER QL ID: NOT DETECTED

## 2024-05-15 PROCEDURE — 25000242 PHARM REV CODE 250 ALT 637 W/ HCPCS: Performed by: HOSPITALIST

## 2024-05-15 PROCEDURE — 99900035 HC TECH TIME PER 15 MIN (STAT)

## 2024-05-15 PROCEDURE — 25000003 PHARM REV CODE 250

## 2024-05-15 PROCEDURE — 94668 MNPJ CHEST WALL SBSQ: CPT

## 2024-05-15 PROCEDURE — 25000242 PHARM REV CODE 250 ALT 637 W/ HCPCS

## 2024-05-15 PROCEDURE — 0W9B3ZZ DRAINAGE OF LEFT PLEURAL CAVITY, PERCUTANEOUS APPROACH: ICD-10-PCS | Performed by: INTERNAL MEDICINE

## 2024-05-15 PROCEDURE — 94664 DEMO&/EVAL PT USE INHALER: CPT

## 2024-05-15 PROCEDURE — 27000221 HC OXYGEN, UP TO 24 HOURS

## 2024-05-15 PROCEDURE — 25000003 PHARM REV CODE 250: Performed by: PHYSICIAN ASSISTANT

## 2024-05-15 PROCEDURE — 27000646 HC AEROBIKA DEVICE

## 2024-05-15 PROCEDURE — 25000003 PHARM REV CODE 250: Performed by: HOSPITALIST

## 2024-05-15 PROCEDURE — 21400001 HC TELEMETRY ROOM

## 2024-05-15 PROCEDURE — 94640 AIRWAY INHALATION TREATMENT: CPT

## 2024-05-15 PROCEDURE — 94761 N-INVAS EAR/PLS OXIMETRY MLT: CPT

## 2024-05-15 PROCEDURE — 99232 SBSQ HOSP IP/OBS MODERATE 35: CPT | Mod: 95,,, | Performed by: STUDENT IN AN ORGANIZED HEALTH CARE EDUCATION/TRAINING PROGRAM

## 2024-05-15 RX ORDER — ELVITEGRAVIR, COBICISTAT, EMTRICITABINE, AND TENOFOVIR ALAFENAMIDE 150; 150; 200; 10 MG/1; MG/1; MG/1; MG/1
1 TABLET ORAL DAILY
Qty: 30 TABLET | Refills: 0 | Status: SHIPPED | OUTPATIENT
Start: 2024-05-15 | End: 2024-05-15 | Stop reason: HOSPADM

## 2024-05-15 RX ADMIN — BENZONATATE 100 MG: 100 CAPSULE ORAL at 09:05

## 2024-05-15 RX ADMIN — IPRATROPIUM BROMIDE AND ALBUTEROL SULFATE 3 ML: 2.5; .5 SOLUTION RESPIRATORY (INHALATION) at 10:05

## 2024-05-15 RX ADMIN — SULFAMETHOXAZOLE AND TRIMETHOPRIM 1 TABLET: 800; 160 TABLET ORAL at 06:05

## 2024-05-15 RX ADMIN — GUAIFENESIN 1200 MG: 600 TABLET, EXTENDED RELEASE ORAL at 09:05

## 2024-05-15 RX ADMIN — SODIUM CHLORIDE SOLN NEBU 3% 4 ML: 3 NEBU SOLN at 12:05

## 2024-05-15 RX ADMIN — BENZONATATE 100 MG: 100 CAPSULE ORAL at 02:05

## 2024-05-15 RX ADMIN — OXYCODONE HYDROCHLORIDE AND ACETAMINOPHEN 1 TABLET: 5; 325 TABLET ORAL at 02:05

## 2024-05-15 RX ADMIN — ACETAMINOPHEN 650 MG: 325 TABLET ORAL at 01:05

## 2024-05-15 RX ADMIN — OXYCODONE HYDROCHLORIDE AND ACETAMINOPHEN 1 TABLET: 5; 325 TABLET ORAL at 09:05

## 2024-05-15 RX ADMIN — SODIUM CHLORIDE SOLN NEBU 3% 4 ML: 3 NEBU SOLN at 04:05

## 2024-05-15 NOTE — PLAN OF CARE
Problem: Adult Inpatient Plan of Care  Goal: Plan of Care Review  Outcome: Progressing  Goal: Patient-Specific Goal (Individualized)  Outcome: Progressing  Goal: Absence of Hospital-Acquired Illness or Injury  Outcome: Progressing  Goal: Optimal Comfort and Wellbeing  Outcome: Progressing  Goal: Readiness for Transition of Care  Outcome: Progressing     Problem: COPD (Chronic Obstructive Pulmonary Disease)  Goal: Optimal Chronic Illness Coping  Outcome: Progressing  Goal: Optimal Level of Functional [place]  Outcome: Progressing  Goal: Absence of Infection Signs and Symptoms  Outcome: Progressing  Goal: Improved Oral Intake  Outcome: Progressing  Goal: Effective Oxygenation and Ventilation  Outcome: Progressing     Problem: Infection  Goal: Absence of Infection Signs and Symptoms  Outcome: Progressing     Problem: Coping Ineffective  Goal: Effective Coping  Outcome: Progressing
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
## 2024-05-15 NOTE — PLAN OF CARE
9:54 AM SINDI spoke to pt at the bedside to inform of two options: shelter vs group home. SW attempted to provide resources to pt however, he phoned a friend while at the bedside to attempt with getting assistance from her. Per friend, she has an inspection today and will follow up around 1pm to confirm if she can care for the pt. SW explained to pt that if she is unable to care for him, he will then need to present to a homeless shelter where he can receive his Oxygen. Treatment team aware. SINDI will continue to f/u.     1:34 PM SW spoke to the pt again at the bedside. Pt provided an address for home O2, however he cannot go to the address until tomorrow due to ongoing inspection. SW informed pt that we can not keep him for that reason and then it becomes the same issue on tomorrow due to being medically stable for DC. SW  informed pt again that his only option is a shelter for the night and he declined. Pt is requesting some form of clothing/paper scrubs to leave. Treatment team notified.     BECKY Valdovinos  [facility]  Ext. [phone]

## 2024-05-15 NOTE — NURSING
6 minutes walk completed. Patient was at 95% while exercise on room air. Patient complained of dizziness, shortness of breath and asked for O2 to be place back on.

## 2024-05-15 NOTE — NURSING
Home Oxygen Evaluation    Date Performed: 5/15/2024    1) Patient's Home O2 Sat on room air, while at rest: 98%        If O2 sats on room air at rest are 88% or below, patient qualifies. No additional testing needed. Document N/A in steps 2 and 3. If 89% or above, complete steps 2.      2) Patient's O2 Sat on room air while exercisin%        If O2 sats on room air while exercising remain 89% or above patient does not qualify, no further testing needed Document N/A in step 3. If O2 sats on room air while exercising are 88% or below, continue to step 3.      3) Patient's O2 Sat while exercising on O2:  N/A at N/A LPM         (Must show improvement from #2 for patients to qualify)    If O2 sats improve on oxygen, patient qualifies for portable oxygen. If not, the patient does not qualify.

## 2024-05-15 NOTE — PROGRESS NOTES
[facility] Hospital Medicine  Progress Note    Primary Team: [facility] HOSP MED O  Admit Date: 5/8/2024    Subjective:      HPI:  Oscar Waldron is a 64 y.o. male with a PMHx of cocaine abuse, HIV not on HAART, tobacco abuse, HTN who present to [facility] for evaluation of shortness of breath. Patient reports progressive worsening of shortness of breath over the past few weeks. He now has a  productive cough with associated left sided chest/ left upper abdomen pain which only occurs during coughing fits. Shortness of breath worsens while lying flat and with minimal exertion. No improvement with albuterol inhaler that he borrowed from a family member. Admits to smoking crack cocaine 2 days ago. Has >40 year hx of tobacco abuse. He stopped taking his HIV medications a few years ago.       ED: tachycardic to , improved without intervention. Satting 96% on 3L NC. BNP <10, trop 0.010. EKG with non-specifically TWAs. CXR with cardiomegaly with pulmonary vascular congestion and bilateral interstitial opacities, moderate left-sided pleural effusion with associated compressive atelectasis. Given solumedrol 125mg, azithro and rocephin.      Overview/Hospital Course:  Oscar Waldron is placed in  Observation for management of acute respiratory failure with hypoxia. Requiring supplemental oxygen on admit. CT chest reviewed, concern for malignant/metastatic process vs infectious process. Started on IV rocephin and azithromycin. Pulmonology and ID consulted. S/p thoracentesis for L pleural effusion 5/9 with 950 mL removed & showing exudative process. Infectious workup so far negative. Pleural fluid cytology unfortunately showing adenocarcinoma. Oncology consulted and have set an oncology appt on 5/22. ID is also setting him up in HOP clinic where he will need to present to start ART. Palliative Care also visited with patient and prn Percocet started. Repeat therapeutic thora done 5/15 with 1 L removed.  Discharge  delayed due to housing situation.      Interval History:  Continues to have shortness of breath and cough. Reports chest pain after therapeutic thora today by Pulm with 1 L removed. Repeat CXR pending. Patient states that he will not be able to discharge to his friend's home till tomorrow due to ongoing inspection.     ROS:  As per HPI  General: no fever, no chills  Cardiovascular:  no orthopnea  Respiratory: + cough, shortness of breath, chest pain  All other systems reviewed & are negative.     No past medical history on file.  No past surgical history on file.      Objective:   Last 24 Hour Vital Signs:  BP  Min: 134/79  Max: 157/87  Temp  Av.6 °F (37 °C)  Min: 98.2 °F (36.8 °C)  Max: 98.9 °F (37.2 °C)  Pulse  Av.3  Min: 78  Max: 96  Resp  Av  Min: 16  Max: 22  SpO2  Av.1 %  Min: 91 %  Max: 100 %  I/O last 3 completed shifts:  In: -   Out: 200 [Urine:200]    Physical Examination:  GEN: AAOx3, NAD  HEENT: NCAT, MMM, PERRL, EOMI, oropharynx clear  CV: RRR, no m/r, no S3/S4  RESP: CTAB, no wheezes/crackles, no increased WOB, on 2L NC  ABD: soft, NTND, normoactive BS, no organomegaly  EXTR: no c/c/e, intact distal pulses x 4  NEURO: PERRL, EOMI, moving all four extremities, intact sensation to light touch, no focal deficits  SKIN: no rashes, lesions, or color changes  PSYCH: normal affect    Laboratory:  I have reviewed all pertinent lab results/findings within the past 24 hours.    Radiology:  I have reviewed all pertinent imaging results/findings within the past 24 hours.    Current Medications:     Infusions:       Scheduled:   benzonatate  100 mg Oral TID    guaiFENesin  1,200 mg Oral BID    sodium chloride 3%  4 mL Nebulization Q8H    sulfamethoxazole-trimethoprim 800-160mg  1 tablet Oral Every Mon, Wed, Fri        PRN:    Current Facility-Administered Medications:     acetaminophen, 650 mg, Oral, Q4H PRN    albuterol-ipratropium, 3 mL, Nebulization, Q6H PRN    bisacodyL, 10 mg, Rectal, Daily  PRN    dextrose 10%, 12.5 g, Intravenous, PRN    dextrose 10%, 25 g, Intravenous, PRN    glucagon (human recombinant), 1 mg, Intramuscular, PRN    glucose, 16 g, Oral, PRN    glucose, 24 g, Oral, PRN    hydrOXYzine HCL, 10 mg, Oral, TID PRN    iohexol, 15 mL, Oral, PRN    melatonin, 6 mg, Oral, Nightly PRN    ondansetron, 8 mg, Oral, Q8H PRN    oxyCODONE-acetaminophen, 1 tablet, Oral, Q8H PRN    polyethylene glycol, 17 g, Oral, Daily PRN    promethazine, 25 mg, Oral, Q6H PRN    Prior records reviewed.       Assessment/Plan:     Oscar Waldron is a 64 y.o.male with    Acute respiratory failure with hypoxia  Abnormal CT of lung  Metastatic lung adenocarcinoma  Left pleural effusion  - CT chest reviewed:  1. Solid spiculated nodule in the left lung apex and irregular consolidation area in the lingula, worrisome for lung cancer.   2. Ground-glass nodule in the right middle lobe, may represent infectious/inflammatory process versus primary lung cancer.  Consider attention on follow-up studies.   3. Bilateral irregular pulmonary nodules, may be sequela of infectious/inflammatory process versus neoplastic nodules.  Consider attention on follow-up.   4. Ill-defined hypoattenuation of the inferior hepatic lobe and few lucent lesions in the vertebral body of L1, nonspecific but worrisome for metastatic process.   5. Left adrenal nodule.     - CT abdomen reviewed:   Few heterogeneously hypodense liver lesions suspicious for metastasis.   Indeterminate indistinct lucent lesions and L1 vertebral body and right intertrochanteric region.  Metastasis not excluded.  Further evaluation could be obtained with contrast enhanced MRI.     - received rocephin/azithro/prednisone; prn duonebs  - acapella and CPT ordered  - pulm & ID consulted  - thora 5/9 with 950 ml removed; pleural fluid studies suggestive of exudative process. Repeat thora today with 1 L removed; repeat CXR.  - sputum cx - normal respiratory av  - mTB PCR negative  -  PJP PCP negative  - aspergillus antigen negative  - fungitell negative  - crypto antigen negative  - AFB smear negative x 3. Follow AFB cultures  - pleural fluid cultures negative  - pleural fluid cytology shows lung adenocarcinoma       - imaging suggestive of metastatic disease  - oncology consulted -  OP f/u on 5/22  - palliative care consulted - prn percocet ordered  - qualifies for home oxygen per 6 min walk test performed on 5/14; home oxygen order placed    Hypophosphatemia  - phos 1.5, replaced  - daily phos     High cholesterol  - not on statin  - lipid panel reviewed     HTN (hypertension)  - not on home meds  - controlled currently     Human immunodeficiency virus (HIV) disease  AIDS  - non-adherent to ART; off since 2020  - CD4 115  - ID consulted: starting Bactrim for ppx  - ID arranging HOP clinic follow up. I would prefer that the patient present to HOP clinic prior to restarting ART        Gail Aarujo MD  Hospital Medicine Staff  [facility]

## 2024-05-15 NOTE — PROCEDURES
"Oscar Waldron is a 64 y.o. male patient.    Temp: 98.5 °F (36.9 °C) (05/15/24 0748)  Pulse: 92 (05/15/24 1050)  Resp: (!) 22 (05/15/24 1050)  BP: (!) 151/90 (05/15/24 0748)  SpO2: 98 % (05/15/24 1050)  Weight: 68.8 kg (151 lb 10.8 oz) (05/10/24 0400)  Height: 5' 5" (165.1 cm) (24 0854)       Thoracentesis    Date/Time: 5/15/2024 11:49 AM  Location procedure was performed: [facility] PULMONARY MEDICINE    Performed by: Lucinda Farley MD  Authorized by: Lucinda Farley MD  Pre-operative diagnosis: Pleural Effusion  Post-operative diagnosis: Pleural Effusion  Consent Done: Yes  Consent: Verbal consent obtained. Written consent obtained.  Risks and benefits: risks, benefits and alternatives were discussed  Consent given by: patient  Required items: required blood products, implants, devices, and special equipment available  Patient identity confirmed: , name and MRN  Time out: Immediately prior to procedure a "time out" was called to verify the correct patient, procedure, equipment, support staff and site/side marked as required.  Procedure purpose: therapeutic  Indications: pleural effusion  Preparation: Patient was prepped and draped in the usual sterile fashion.  Local anesthesia used: yes    Anesthesia:  Local anesthesia used: yes  Local Anesthetic: lidocaine 1% without epinephrine  Anesthetic total: 8 mL    Patient sedated: no  Preparation: skin prepped with ChloraPrep  Patient position: sitting  Ultrasound guidance: yes  Location: left posterior  Intercostal space: 5th  Puncture method: through-the-needle catheter  Needle size: 16  Catheter size: 8 Mongolian  Number of attempts: 1  Drainage amount: 1000 ml  Drainage characteristics: serosanguinous and clear  Patient tolerance: Patient tolerated the procedure well with no immediate complications  Post-procedure x-ray ordered: Pending.  Drainage Tube: removed    Lucinda Farley MD PGY VI  [employer] Pulmonary/Critical Care Fellow     5/15/2024    "

## 2024-05-15 NOTE — PLAN OF CARE
Patient [id number]. No distress noted. Call light within reach. Bed in the lowest position.   Problem: Adult Inpatient Plan of Care  Goal: Plan of Care Review  Outcome: Progressing  Goal: Patient-Specific Goal (Individualized)  Outcome: Progressing  Goal: Absence of Hospital-Acquired Illness or Injury  Outcome: Progressing  Goal: Optimal Comfort and Wellbeing  Outcome: Progressing  Goal: Readiness for Transition of Care  Outcome: Progressing     Problem: COPD (Chronic Obstructive Pulmonary Disease)  Goal: Optimal Chronic Illness Coping  Outcome: Progressing  Goal: Optimal Level of Functional [place]  Outcome: Progressing  Goal: Absence of Infection Signs and Symptoms  Outcome: Progressing  Goal: Improved Oral Intake  Outcome: Progressing  Goal: Effective Oxygenation and Ventilation  Outcome: Progressing     Problem: Infection  Goal: Absence of Infection Signs and Symptoms  Outcome: Progressing     Problem: Coping Ineffective  Goal: Effective Coping  Outcome: Progressing

## 2024-05-15 NOTE — SUBJECTIVE & OBJECTIVE
Interval History: Chart reviewed including 24h medication use. Patient sitting on side of bed, coughing, s/p thoracentesis with copious serosanguinous fluid removed. He notes persistent chest discomfort and pain around the site of fluid removal. Patient unable to participate in significant discussion due to respiratory distress and discomfort s/p thora, requests that I return tomorrow    Palliative ROS:  PRNs: none  Pain + (chest discomfort, constant aching pain, worse with deep breathing; now with some new pain around thora site)  Dyspnea +  Nausea -  Anxiety -  Agitation -      No past medical history on file.    No past surgical history on file.    Review of patient's allergies indicates:  No Known Allergies    Medications:  Continuous Infusions:  Scheduled Meds:   benzonatate  100 mg Oral TID    guaiFENesin  1,200 mg Oral BID    sodium chloride 3%  4 mL Nebulization Q8H    sulfamethoxazole-trimethoprim 800-160mg  1 tablet Oral Every Mon, Wed, Fri     PRN Meds:  Current Facility-Administered Medications:     acetaminophen, 650 mg, Oral, Q4H PRN    albuterol-ipratropium, 3 mL, Nebulization, Q6H PRN    bisacodyL, 10 mg, Rectal, Daily PRN    dextrose 10%, 12.5 g, Intravenous, PRN    dextrose 10%, 25 g, Intravenous, PRN    glucagon (human recombinant), 1 mg, Intramuscular, PRN    glucose, 16 g, Oral, PRN    glucose, 24 g, Oral, PRN    hydrOXYzine HCL, 10 mg, Oral, TID PRN    iohexol, 15 mL, Oral, PRN    melatonin, 6 mg, Oral, Nightly PRN    ondansetron, 8 mg, Oral, Q8H PRN    oxyCODONE-acetaminophen, 1 tablet, Oral, Q8H PRN    polyethylene glycol, 17 g, Oral, Daily PRN    promethazine, 25 mg, Oral, Q6H PRN    Family History    None       Tobacco Use    Smoking status: Not on file    Smokeless tobacco: Not on file   Substance and Sexual Activity    Alcohol use: Not on file    Drug use: Not on file    Sexual activity: Not on file         Objective:     Vital Signs (Most Recent):  Temp: 98.9 °F (37.2 °C) (05/15/24  "1222)  Pulse: 91 (05/15/24 1222)  Resp: 18 (05/15/24 1442)  BP: 134/79 (05/15/24 1222)  SpO2: 96 % (05/15/24 1222) Vital Signs (24h Range):  Temp:  [98.2 °F (36.8 °C)-98.9 °F (37.2 °C)] 98.9 °F (37.2 °C)  Pulse:  [78-96] 91  Resp:  [16-22] 18  SpO2:  [91 %-100 %] 96 %  BP: (134-157)/(79-90) 134/79     Weight: 68.8 kg (151 lb 10.8 oz)  Body mass index is 25.24 kg/m².       Physical Exam  Vitals and nursing note reviewed.   Constitutional:       Comments: Older, thin, ill-appearing male, conversant; occasional grimacing    Eyes:      Extraocular Movements: Extraocular movements intact.   Pulmonary:      Comments: Mild respiratory distress s/p thora, able to speak in full sentences  Abdominal:      General: Abdomen is flat.      Palpations: Abdomen is soft.   Skin:     General: Skin is warm and dry.   Neurological:      General: No focal deficit present.      Mental Status: He is oriented to person, place, and time. Mental status is at baseline.   Psychiatric:         Mood and Affect: Mood normal.         Behavior: Behavior normal.         Thought Content: Thought content normal.         Judgment: Judgment normal.                       CBC:   Recent Labs   Lab 05/13/24  0653   WBC 11.08   HGB 16.4   HCT 52.6   MCV 90        BMP:  No results for input(s): "GLU", "NA", "K", "CL", "CO2", "BUN", "CREATININE", "CALCIUM", "MG" in the last 24 hours.  LFT:  Lab Results   Component Value Date    AST 27 05/08/2024    ALKPHOS 98 05/08/2024    BILITOT 0.2 05/08/2024     Albumin:   Albumin   Date Value Ref Range Status   05/08/2024 2.9 (L) 3.5 - 5.2 g/dL Final     Protein:   Total Protein   Date Value Ref Range Status   05/08/2024 6.9 6.0 - 8.4 g/dL Final     Lactic acid:   No results found for: "LACTATE"    Reviewed CBC with stable blood counts,       "

## 2024-05-15 NOTE — NURSING
Patient alert and oriented. Skin intact.  Patient denies any discomfort or distress at this time.  Call light within reach. Side rails x2.

## 2024-05-15 NOTE — PROGRESS NOTES
"Progress Note  Pulmonary & Critical Care Medicine    Attending: Dr. Downing  Fellow: Lucinda Farley  Admit Date: 5/8/2024  Today's Date: 05/15/2024      SUBJECTIVE:     NAEO. Patient continues to have MURILLO and significant cough. Pulm asked to evaluate for a follow up thoracentesis.    OBJECTIVE:     Vital Signs Trends/Hx Reviewed  Vitals:    05/15/24 0415 05/15/24 0748 05/15/24 1004 05/15/24 1050   BP: (!) 151/84 (!) 151/90     BP Location: Left arm      Patient Position: Lying      Pulse: 91 93  92   Resp: 20 18  (!) 22   Temp: 98.2 °F (36.8 °C) 98.5 °F (36.9 °C)     TempSrc: Oral Oral     SpO2: (!) 93% (!) 94% 100% 98%   Weight:       Height:           Ventilator settings:   No data recorded  Oxygen Concentration (%):  [28] 28        Physical Exam:  General: NAD, cooperative & interactive.  HEENT: AT/NC, PERRL, EOMI, oral and nasal mucosa moist.   Neck: Supple without JVD or palpable LAD.   Cardiac: normal rate, regular rhythm, with no MRG with brisk cap refill and symmetric pulses in distal extremities.  Respiratory: Normal inspection. Symmetric chest rise. Normal palpation and percussion. Auscultation clear on right. Decreased breath sounds on left base. No increased work of breathing noted.   Abdomen: Soft, NT/ND. +BS. No hepatosplenomegaly.   Extremities: No edema.   Neuro: Grossly intact to brief exam. Oriented x3 with appropriate mood/affect to situation.       Laboratory:  No results for input(s): "PH", "PCO2", "PO2", "HCO3", "POCSATURATED", "BE" in the last 24 hours.  No results for input(s): "WBC", "RBC", "HGB", "HCT", "PLT", "MCV", "MCH", "MCHC" in the last 24 hours.  No results for input(s): "GLUCOSE", "NA", "K", "CL", "CO2", "BUN", "CREATININE", "CALCIUM", "MG" in the last 24 hours.    Microbiology Data:   Microbiology Results (last 7 days)       Procedure Component Value Units Date/Time    Blood culture [[id number]] Collected: 05/09/24 1707    Order Status: Completed Specimen: Blood from Peripheral, " Antecubital, Right Updated: 05/14/24 2222     Blood Culture, Routine No growth after 5 days.    Blood culture [[id number]] Collected: 05/09/24 1745    Order Status: Completed Specimen: Blood from Peripheral, Antecubital, Left Updated: 05/14/24 2222     Blood Culture, Routine No growth after 5 days.    Culture, body fluid - Bactec [[id number]] Collected: 05/09/24 1211    Order Status: Completed Specimen: Body Fluid from Thoracentesis Fluid Updated: 05/14/24 1812     Body Fluid Culture, Sterile No growth after 5 days.    Blood culture (site 2) [[id number]] Collected: 05/08/24 2205    Order Status: Completed Specimen: Blood from Peripheral, Antecubital, Right Updated: 05/13/24 2312     Blood Culture, Routine No growth after 5 days.    Narrative:      Site # 2, aerobic only    AFB Culture & Smear [[id number]] Collected: 05/11/24 0451    Order Status: Completed Specimen: Sputum Updated: 05/13/24 1842     AFB Culture & Smear Culture in progress     AFB CULTURE STAIN No acid fast bacilli seen.    AFB Culture & Smear [[id number]] Collected: 05/10/24 0940    Order Status: Completed Specimen: Sputum Updated: 05/13/24 1842     AFB Culture & Smear Culture in progress     AFB CULTURE STAIN No acid fast bacilli seen.    AFB Culture & Smear [[id number]]     Order Status: Sent Specimen: Blood     Fungus culture [[id number]] Collected: 05/11/24 0451    Order Status: Completed Specimen: Sputum Updated: 05/13/24 1149     Fungus (Mycology) Culture Culture in progress    Fungus culture [[id number]] Collected: 05/09/24 1212    Order Status: Completed Specimen: Body Fluid from Pleural Fluid Updated: 05/13/24 1149     Fungus (Mycology) Culture Culture in progress    Cryptococcal antigen [[id number]] Collected: 05/10/24 1406    Order Status: Completed Specimen: Blood, Venous Updated: 05/11/24 1247     Cryptococcal Ag, Blood Negative    Blood culture (site 1) [[id number]]  (Abnormal) Collected: 05/08/24 2205    Order Status: Completed  Specimen: Blood from Peripheral, Antecubital, Right Updated: 05/11/24 1103     Blood Culture, Routine Gram stain aer bottle: Gram positive cocci in clusters resembling Staph      Results called to and read back by:Raven Dawson RN 05/09/2024  17:18      COAGULASE-NEGATIVE STAPHYLOCOCCUS SPECIES  Organism is a probable contaminant      Narrative:      Site # 1, aerobic and anaerobic    Culture, Respiratory with Gram Stain [[id number]] Collected: 05/09/24 1117    Order Status: Completed Specimen: Sputum, Expectorated Updated: 05/11/24 0938     Respiratory Culture Normal respiratory av      No S aureus or Pseudomonas isolated.     Gram Stain (Respiratory) <10 epithelial cells per low power field.     Gram Stain (Respiratory) No WBC's     Gram Stain (Respiratory) Rare Gram negative rods    AFB culture [[id number]] Collected: 05/09/24 1212    Order Status: Completed Specimen: Body Fluid from Pleural Fluid Updated: 05/10/24 2127     AFB Culture & Smear Culture in progress     AFB CULTURE STAIN No acid fast bacilli seen.    AFB Culture & Smear [[id number]] Collected: 05/09/24 1644    Order Status: Completed Specimen: Respiratory from Sputum, Expectorated Updated: 05/10/24 2127     AFB Culture & Smear Culture in progress     AFB CULTURE STAIN No acid fast bacilli seen.    AFB Culture & Smear [[id number]]     Order Status: Canceled Specimen: Respiratory from Sputum, Expectorated     AFB stain [[id number]] Collected: 05/09/24 1212    Order Status: Completed Specimen: Body Fluid from Pleural Fluid Updated: 05/09/24 1854     Direct Acid Fast No acid fast bacilli seen.    JORDAN prep [[id number]] Collected: 05/09/24 1212    Order Status: Completed Specimen: Body Fluid from Pleural Fluid Updated: 05/09/24 1828     KOH Prep No yeast or fungal elements seen    MRSA/SA Rapid ID by PCR from Blood culture [[id number]] Collected: 05/08/24 2205    Order Status: Completed Updated: 05/09/24 1812     Staph aureus ID by PCR Negative      Methicillin Resistant ID by PCR Negative    Narrative:      Site # 1, aerobic and anaerobic    Gram stain [[id number]] Collected: 05/09/24 1212    Order Status: Completed Specimen: Body Fluid from Pleural Fluid Updated: 05/09/24 1717     Gram Stain Result Rare WBC's      No organisms seen    AFB Culture & Smear [[id number]] Collected: 05/09/24 1644    Order Status: Canceled Specimen: Respiratory from Sputum, Expectorated     Aerobic culture [[id number]]     Order Status: Completed Specimen: Pleural Fluid     Culture, Anaerobe [[id number]]     Order Status: Completed Specimen: Body Fluid from Pleural Fluid     Culture, Anaerobe [[id number]]     Order Status: Canceled Specimen: Body Fluid from Pleural Fluid     Aerobic culture [[id number]]     Order Status: Canceled Specimen: Pleural Fluid              Chest Imaging:   X-Ray Chest AP Portable    Result Date: 5/15/2024  Increasing left pleural effusion Electronically signed by: Boris Gonzalez MD Date:    05/15/2024 Time:    07:52        Infusions:        Scheduled Medications:    benzonatate  100 mg Oral TID    guaiFENesin  1,200 mg Oral BID    sodium chloride 3%  4 mL Nebulization Q8H    sulfamethoxazole-trimethoprim 800-160mg  1 tablet Oral Every Mon, Wed, Fri       PRN Medications:     Current Facility-Administered Medications:     acetaminophen, 650 mg, Oral, Q4H PRN    albuterol-ipratropium, 3 mL, Nebulization, Q6H PRN    bisacodyL, 10 mg, Rectal, Daily PRN    dextrose 10%, 12.5 g, Intravenous, PRN    dextrose 10%, 25 g, Intravenous, PRN    glucagon (human recombinant), 1 mg, Intramuscular, PRN    glucose, 16 g, Oral, PRN    glucose, 24 g, Oral, PRN    hydrOXYzine HCL, 10 mg, Oral, TID PRN    iohexol, 15 mL, Oral, PRN    melatonin, 6 mg, Oral, Nightly PRN    ondansetron, 8 mg, Oral, Q8H PRN    oxyCODONE-acetaminophen, 1 tablet, Oral, Q8H PRN    polyethylene glycol, 17 g, Oral, Daily PRN    promethazine, 25 mg, Oral, Q6H PRN    Problem List:   Patient Active  Problem List   Diagnosis    Central spinal stenosis    Depression    AIDS (acquired immunodeficiency syndrome), CD4 <=200    Primary osteoarthritis of both knees    HTN (hypertension)    High cholesterol    Psychiatric disorder    Cervical spondylosis    Acute respiratory failure with hypoxia    Pleural effusion, left    Hypophosphatemia    Hypodense mass of liver    Adenocarcinoma, lung    Palliative care encounter       ASSESSMENT & RECOMMENDATIONS     64M with HIV and pulmonary nodules admitted for acute hypoxic respiratory failure in the setting of pneumonia and left pleural effusion. CT chest concerning for RML nodule, SUZANNA nodule, LLL consolidation, left pleural effusion, emphysematous changes. Nodules appear spiculated and concerning for possible malignancy. Pulmonology consulted for evaluation and management of pleural effusion.     Evaluated left pleural effusion with bedside ultrasound. Adequate pocket identified. Patient consented and bedside thoracentesis performed. Sent off thoracentesis labs including cytology and GMS stain.     Initial thoracentesis labs reveal positive Light's criteria. , glucose 115. Initial cultures negative. Most consistent with simple parapneumonic effusion.      RECOMMENDATIONS / PLAN:  -- Therapeutic thoracentesis performed today with 1L removed. No studies sent.        Thank you for allowing us to participate in the care of this patient. We will sign off. Please call with questions.    Lucinda Farley M.D., PGY-VI  [employer] Pulmonary/Critical Care Fellow
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
## 2024-05-16 LAB — M TB IFN-G BLD-IMP: NEGATIVE

## 2024-05-16 PROCEDURE — 94761 N-INVAS EAR/PLS OXIMETRY MLT: CPT

## 2024-05-16 PROCEDURE — 25000242 PHARM REV CODE 250 ALT 637 W/ HCPCS

## 2024-05-16 PROCEDURE — 21400001 HC TELEMETRY ROOM

## 2024-05-16 PROCEDURE — 99232 SBSQ HOSP IP/OBS MODERATE 35: CPT | Mod: ,,, | Performed by: STUDENT IN AN ORGANIZED HEALTH CARE EDUCATION/TRAINING PROGRAM

## 2024-05-16 PROCEDURE — 99900026 HC AIRWAY MAINTENANCE (STAT)

## 2024-05-16 PROCEDURE — 94640 AIRWAY INHALATION TREATMENT: CPT

## 2024-05-16 PROCEDURE — 25000242 PHARM REV CODE 250 ALT 637 W/ HCPCS: Performed by: HOSPITALIST

## 2024-05-16 PROCEDURE — 99900035 HC TECH TIME PER 15 MIN (STAT)

## 2024-05-16 PROCEDURE — 25000003 PHARM REV CODE 250: Performed by: HOSPITALIST

## 2024-05-16 PROCEDURE — 27000221 HC OXYGEN, UP TO 24 HOURS

## 2024-05-16 PROCEDURE — 25000003 PHARM REV CODE 250

## 2024-05-16 RX ADMIN — HYDROXYZINE HYDROCHLORIDE 10 MG: 10 TABLET ORAL at 05:05

## 2024-05-16 RX ADMIN — OXYCODONE HYDROCHLORIDE AND ACETAMINOPHEN 1 TABLET: 5; 325 TABLET ORAL at 05:05

## 2024-05-16 RX ADMIN — BENZONATATE 100 MG: 100 CAPSULE ORAL at 09:05

## 2024-05-16 RX ADMIN — GUAIFENESIN 1200 MG: 600 TABLET, EXTENDED RELEASE ORAL at 08:05

## 2024-05-16 RX ADMIN — SODIUM CHLORIDE SOLN NEBU 3% 4 ML: 3 NEBU SOLN at 08:05

## 2024-05-16 RX ADMIN — GUAIFENESIN 1200 MG: 600 TABLET, EXTENDED RELEASE ORAL at 09:05

## 2024-05-16 RX ADMIN — SODIUM CHLORIDE SOLN NEBU 3% 4 ML: 3 NEBU SOLN at 05:05

## 2024-05-16 RX ADMIN — IPRATROPIUM BROMIDE AND ALBUTEROL SULFATE 3 ML: 2.5; .5 SOLUTION RESPIRATORY (INHALATION) at 08:05

## 2024-05-16 RX ADMIN — BENZONATATE 100 MG: 100 CAPSULE ORAL at 03:05

## 2024-05-16 RX ADMIN — SODIUM CHLORIDE SOLN NEBU 3% 4 ML: 3 NEBU SOLN at 12:05

## 2024-05-16 RX ADMIN — IPRATROPIUM BROMIDE AND ALBUTEROL SULFATE 3 ML: 2.5; .5 SOLUTION RESPIRATORY (INHALATION) at 05:05

## 2024-05-16 RX ADMIN — OXYCODONE HYDROCHLORIDE AND ACETAMINOPHEN 1 TABLET: 5; 325 TABLET ORAL at 04:05

## 2024-05-16 RX ADMIN — BENZONATATE 100 MG: 100 CAPSULE ORAL at 08:05

## 2024-05-16 RX ADMIN — IPRATROPIUM BROMIDE AND ALBUTEROL SULFATE 3 ML: 2.5; .5 SOLUTION RESPIRATORY (INHALATION) at 04:05

## 2024-05-16 NOTE — ASSESSMENT & PLAN NOTE
Human immunodeficiency virus (HIV) disease  AIDS  - non-adherent to ART; off since 2020  - CD4 115  - ID consulted: starting Bactrim for ppx  - ID arranging HOP clinic follow up. I would prefer that the patient present to HOP clinic prior to restarting ART

## 2024-05-16 NOTE — PLAN OF CARE
Problem: Adult Inpatient Plan of Care  Goal: Plan of Care Review  Outcome: Progressing  Goal: Patient-Specific Goal (Individualized)  Outcome: Progressing  Goal: Absence of Hospital-Acquired Illness or Injury  Outcome: Progressing  Goal: Optimal Comfort and Wellbeing  Outcome: Progressing     Problem: COPD (Chronic Obstructive Pulmonary Disease)  Goal: Optimal Chronic Illness Coping  Outcome: Progressing  Goal: Optimal Level of Functional Lea  Outcome: Progressing  Goal: Absence of Infection Signs and Symptoms  Outcome: Progressing  Goal: Improved Oral Intake  Outcome: Progressing  Goal: Effective Oxygenation and Ventilation  Outcome: Progressing     Problem: Infection  Goal: Absence of Infection Signs and Symptoms  Outcome: Progressing     Problem: Coping Ineffective  Goal: Effective Coping  Outcome: Progressing

## 2024-05-16 NOTE — ASSESSMENT & PLAN NOTE
Acute respiratory failure with hypoxia  Abnormal CT of lung  Metastatic lung adenocarcinoma  Left pleural effusion  - CT chest reviewed:  1. Solid spiculated nodule in the left lung apex and irregular consolidation area in the lingula, worrisome for lung cancer.   2. Ground-glass nodule in the right middle lobe, may represent infectious/inflammatory process versus primary lung cancer.  Consider attention on follow-up studies.   3. Bilateral irregular pulmonary nodules, may be sequela of infectious/inflammatory process versus neoplastic nodules.  Consider attention on follow-up.   4. Ill-defined hypoattenuation of the inferior hepatic lobe and few lucent lesions in the vertebral body of L1, nonspecific but worrisome for metastatic process.   5. Left adrenal nodule.      - CT abdomen reviewed:   Few heterogeneously hypodense liver lesions suspicious for metastasis.   Indeterminate indistinct lucent lesions and L1 vertebral body and right intertrochanteric region.  Metastasis not excluded.  Further evaluation could be obtained with contrast enhanced MRI.      - received rocephin/azithro/prednisone; prn duonebs  - acapella and CPT ordered  - pulm & ID consulted  - thora 5/9 with 950 ml removed; pleural fluid studies suggestive of exudative process. Repeat thora today with 1 L removed; repeat CXR.  - sputum cx - normal respiratory av  - mTB PCR negative  - PJP PCP negative  - aspergillus antigen negative  - fungitell negative  - crypto antigen negative  - AFB smear negative x 3. Follow AFB cultures  - pleural fluid cultures negative  - pleural fluid cytology shows lung adenocarcinoma       - imaging suggestive of metastatic disease  - oncology consulted -  OP f/u on 5/22  - palliative care consulted - prn percocet ordered  - qualifies for home oxygen per 6 min walk test performed on 5/14; home oxygen order placed  - persistently hypoxic 5/16 - 78% on RA; repeat CXR

## 2024-05-16 NOTE — SUBJECTIVE & OBJECTIVE
"Interval History: Chart reviewed including 24h medication use. Patient sitting on side of bed, coughing, s/p thoracentesis with copious serosanguinous fluid removed. He notes persistent chest discomfort and pain around the site of fluid removal. Feeling better with symptom management. Dispo remains unclear. Also wants to wait on filling out MPOA document before speaking more with his daughter and sister    Palliative ROS:  PRNs: oxycodone 5-APAP x3 (22 OME)  Pain + (chest discomfort, constant aching pain, worse with deep breathing; now with some new pain around thora site; notes oxycodone helps but "only for 4 hours or so")  Dyspnea +  Nausea -  Anxiety -  Agitation -      No past medical history on file.    No past surgical history on file.    Review of patient's allergies indicates:  No Known Allergies    Medications:  Continuous Infusions:  Scheduled Meds:   benzonatate  100 mg Oral TID    guaiFENesin  1,200 mg Oral BID    sodium chloride 3%  4 mL Nebulization Q8H    sulfamethoxazole-trimethoprim 800-160mg  1 tablet Oral Every Mon, Wed, Fri     PRN Meds:  Current Facility-Administered Medications:     acetaminophen, 650 mg, Oral, Q4H PRN    albuterol-ipratropium, 3 mL, Nebulization, Q6H PRN    bisacodyL, 10 mg, Rectal, Daily PRN    dextrose 10%, 12.5 g, Intravenous, PRN    dextrose 10%, 25 g, Intravenous, PRN    glucagon (human recombinant), 1 mg, Intramuscular, PRN    glucose, 16 g, Oral, PRN    glucose, 24 g, Oral, PRN    hydrOXYzine HCL, 10 mg, Oral, TID PRN    iohexol, 15 mL, Oral, PRN    melatonin, 6 mg, Oral, Nightly PRN    ondansetron, 8 mg, Oral, Q8H PRN    oxyCODONE-acetaminophen, 1 tablet, Oral, Q8H PRN    polyethylene glycol, 17 g, Oral, Daily PRN    promethazine, 25 mg, Oral, Q6H PRN    Family History    None       Tobacco Use    Smoking status: Not on file    Smokeless tobacco: Not on file   Substance and Sexual Activity    Alcohol use: Not on file    Drug use: Not on file    Sexual activity: Not on " "file         Objective:     Vital Signs (Most Recent):  Temp: 97.9 °F (36.6 °C) (05/16/24 1434)  Pulse: 91 (05/16/24 1434)  Resp: 18 (05/16/24 1624)  BP: 131/78 (05/16/24 1434)  SpO2: 95 % (05/16/24 1434) Vital Signs (24h Range):  Temp:  [97.4 °F (36.3 °C)-99.5 °F (37.5 °C)] 97.9 °F (36.6 °C)  Pulse:  [72-97] 91  Resp:  [16-20] 18  SpO2:  [90 %-99 %] 95 %  BP: (101-135)/(59-90) 131/78     Weight: 68.8 kg (151 lb 10.8 oz)  Body mass index is 25.24 kg/m².       Physical Exam  Vitals and nursing note reviewed.   Constitutional:       Comments: Older, thin, ill-appearing male, conversant; occasional grimacing    Eyes:      Extraocular Movements: Extraocular movements intact.   Pulmonary:      Comments: Mild respiratory distress s/p thora, able to speak in full sentences  Abdominal:      General: Abdomen is flat.      Palpations: Abdomen is soft.   Skin:     General: Skin is warm and dry.   Neurological:      General: No focal deficit present.      Mental Status: He is oriented to person, place, and time. Mental status is at baseline.   Psychiatric:         Mood and Affect: Mood normal.         Behavior: Behavior normal.         Thought Content: Thought content normal.         Judgment: Judgment normal.                       CBC:   Recent Labs   Lab 05/13/24  0653   WBC 11.08   HGB 16.4   HCT 52.6   MCV 90        BMP:  No results for input(s): "GLU", "NA", "K", "CL", "CO2", "BUN", "CREATININE", "CALCIUM", "MG" in the last 24 hours.  LFT:  Lab Results   Component Value Date    AST 27 05/08/2024    ALKPHOS 98 05/08/2024    BILITOT 0.2 05/08/2024     Albumin:   Albumin   Date Value Ref Range Status   05/08/2024 2.9 (L) 3.5 - 5.2 g/dL Final     Protein:   Total Protein   Date Value Ref Range Status   05/08/2024 6.9 6.0 - 8.4 g/dL Final     Lactic acid:   No results found for: "LACTATE"    Reviewed CBC with stable blood counts    "
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
## 2024-05-16 NOTE — SUBJECTIVE & OBJECTIVE
Interval History:      Patient is objectively hypoxic at rest with measured saturations on RA 78%.  When upright he has a continuous cough.  Concerns for possible reaccumulation of pleural effusion based on exam of L lung field with decreased aeration throughout.  Repeat CXR, if futher reaccumulation may need to consider pleur-x.       Review of Systems   Constitutional:  Positive for fatigue. Negative for chills and fever.   HENT:  Negative for congestion and sore throat.    Eyes:  Negative for pain.   Respiratory:  Positive for cough and shortness of breath.    Cardiovascular:  Negative for chest pain, palpitations and leg swelling.   Gastrointestinal:  Negative for abdominal pain, constipation, diarrhea, nausea and vomiting.   Genitourinary:  Negative for difficulty urinating, dysuria and hematuria.   Musculoskeletal:  Negative for back pain.   Skin:  Negative for rash.   Neurological:  Negative for light-headedness and headaches.   Hematological:  Does not bruise/bleed easily.   Psychiatric/Behavioral:  Negative for agitation.      Objective:     Vital Signs (Most Recent):  Temp: 98 °F (36.7 °C) (05/16/24 1125)  Pulse: 95 (05/16/24 1125)  Resp: 18 (05/16/24 1125)  BP: (!) 135/90 (05/16/24 1125)  SpO2: 99 % (05/16/24 1125) Vital Signs (24h Range):  Temp:  [97.3 °F (36.3 °C)-99.5 °F (37.5 °C)] 98 °F (36.7 °C)  Pulse:  [72-97] 95  Resp:  [16-20] 18  SpO2:  [90 %-99 %] 99 %  BP: (101-135)/(59-90) 135/90     Weight: 68.8 kg (151 lb 10.8 oz)  Body mass index is 25.24 kg/m².    Intake/Output Summary (Last 24 hours) at 5/16/2024 1148  Last data filed at 5/16/2024 0554  Gross per 24 hour   Intake 660 ml   Output 650 ml   Net 10 ml         Physical Exam  Vitals and nursing note reviewed.   Constitutional:       Comments: Older, thin, ill-appearing male, conversant; occasional grimacing    Eyes:      Extraocular Movements: Extraocular movements intact.   Pulmonary:      Comments: frequent cough interrupting ability to  "speak in full sentences. Diminished breath sounds L all fields.  Bases more diminished   Abdominal:      General: Abdomen is flat.      Palpations: Abdomen is soft.   Skin:     General: Skin is warm and dry.   Neurological:      General: No focal deficit present.      Mental Status: He is oriented to person, place, and time. Mental status is at baseline.   Psychiatric:         Mood and Affect: Mood normal.         Behavior: Behavior normal.         Thought Content: Thought content normal.         Judgment: Judgment normal.             Significant Labs: All pertinent labs within the past 24 hours have been reviewed.  CBC: No results for input(s): "WBC", "HGB", "HCT", "PLT" in the last 48 hours.  CMP: No results for input(s): "NA", "K", "CL", "CO2", "GLU", "BUN", "CREATININE", "CALCIUM", "PROT", "ALBUMIN", "BILITOT", "ALKPHOS", "AST", "ALT", "ANIONGAP", "EGFRNONAA" in the last 48 hours.    Invalid input(s): "ESTGFAFR[place]"    Significant Imaging: I have reviewed all pertinent imaging results/findings within the past 24 hours.  "

## 2024-05-16 NOTE — PROGRESS NOTES
[facility] [address] [place] [address] Medicine  Progress Note    Patient Name: Oscar Waldron  MRN: [id number]  Patient Class: IP- Inpatient   Admission Date: 5/8/2024  Length of Stay: 7 days  Attending Physician: Jani Bravo MD  Primary Care Provider: Kenna, Primary Doctor        Subjective:     Principal Problem:Adenocarcinoma, lung        HPI:  Oscar Waldron is a 64 y.o. male with a PMHx of cocaine abuse, HIV not on HAART, tobacco abuse, HTN who present to [facility] for evaluation of shortness of breath. Patient reports progressive worsening of shortness of breath over the past few weeks. He now has a  productive cough with associated left sided chest/ left upper abdomen pain which only occurs during coughing fits. Shortness of breath worsens while lying flat and with minimal exertion. No improvement with albuterol inhaler that he borrowed from a family member. Admits to smoking crack cocaine 2 days ago. Has >40 year hx of tobacco abuse. He stopped taking his HIV medications a few years ago.      ED: tachycardic to , improved without intervention. Satting 96% on 3L NC. BNP <10, trop 0.010. EKG with non-specifically TWAs. CXR with cardiomegaly with pulmonary vascular congestion and bilateral interstitial opacities, moderate left-sided pleural effusion with associated compressive atelectasis. Given solumedrol 125mg, azithro and rocephin.     Overview/Hospital Course:  Oscar Waldron is placed in  Observation for management of acute respiratory failure with hypoxia. Requiring supplemental oxygen on admit. CXR reviewed. Received antibiotics and IV steroids in ED. Started on oral prednisone, continuing IV rocephin and azithromycin. CT chest reviewed, concern for lung cancer and metastatic process.     Interval History:      Patient is objectively hypoxic at rest with measured saturations on RA 78%.  When upright he has a continuous cough.  Concerns for possible reaccumulation of pleural effusion based on exam of L lung  field with decreased aeration throughout.  Repeat CXR, if futher reaccumulation may need to consider pleur-x.       Review of Systems   Constitutional:  Positive for fatigue. Negative for chills and fever.   HENT:  Negative for congestion and sore throat.    Eyes:  Negative for pain.   Respiratory:  Positive for cough and shortness of breath.    Cardiovascular:  Negative for chest pain, palpitations and leg swelling.   Gastrointestinal:  Negative for abdominal pain, constipation, diarrhea, nausea and vomiting.   Genitourinary:  Negative for difficulty urinating, dysuria and hematuria.   Musculoskeletal:  Negative for back pain.   Skin:  Negative for rash.   Neurological:  Negative for light-headedness and headaches.   Hematological:  Does not bruise/bleed easily.   Psychiatric/Behavioral:  Negative for agitation.      Objective:     Vital Signs (Most Recent):  Temp: 98 °F (36.7 °C) (05/16/24 1125)  Pulse: 95 (05/16/24 1125)  Resp: 18 (05/16/24 1125)  BP: (!) 135/90 (05/16/24 1125)  SpO2: 99 % (05/16/24 1125) Vital Signs (24h Range):  Temp:  [97.3 °F (36.3 °C)-99.5 °F (37.5 °C)] 98 °F (36.7 °C)  Pulse:  [72-97] 95  Resp:  [16-20] 18  SpO2:  [90 %-99 %] 99 %  BP: (101-135)/(59-90) 135/90     Weight: 68.8 kg (151 lb 10.8 oz)  Body mass index is 25.24 kg/m².    Intake/Output Summary (Last 24 hours) at 5/16/2024 1148  Last data filed at 5/16/2024 0554  Gross per 24 hour   Intake 660 ml   Output 650 ml   Net 10 ml         Physical Exam  Vitals and nursing note reviewed.   Constitutional:       Comments: Older, thin, ill-appearing male, conversant; occasional grimacing    Eyes:      Extraocular Movements: Extraocular movements intact.   Pulmonary:      Comments: frequent cough interrupting ability to speak in full sentences. Diminished breath sounds L all fields.  Bases more diminished   Abdominal:      General: Abdomen is flat.      Palpations: Abdomen is soft.   Skin:     General: Skin is warm and dry.   Neurological:     "  General: No focal deficit present.      Mental Status: He is oriented to person, place, and time. Mental status is at baseline.   Psychiatric:         Mood and Affect: Mood normal.         Behavior: Behavior normal.         Thought Content: Thought content normal.         Judgment: Judgment normal.             Significant Labs: All pertinent labs within the past 24 hours have been reviewed.  CBC: No results for input(s): "WBC", "HGB", "HCT", "PLT" in the last 48 hours.  CMP: No results for input(s): "NA", "K", "CL", "CO2", "GLU", "BUN", "CREATININE", "CALCIUM", "PROT", "ALBUMIN", "BILITOT", "ALKPHOS", "AST", "ALT", "ANIONGAP", "EGFRNONAA" in the last 48 hours.    Invalid input(s): "ESTGFA[place]"    Significant Imaging: I have reviewed all pertinent imaging results/findings within the past 24 hours.    Assessment/Plan:      Acute respiratory failure with hypoxia  Acute respiratory failure with hypoxia  Abnormal CT of lung  Metastatic lung adenocarcinoma  Left pleural effusion  - CT chest reviewed:  1. Solid spiculated nodule in the left lung apex and irregular consolidation area in the lingula, worrisome for lung cancer.   2. Ground-glass nodule in the right middle lobe, may represent infectious/inflammatory process versus primary lung cancer.  Consider attention on follow-up studies.   3. Bilateral irregular pulmonary nodules, may be sequela of infectious/inflammatory process versus neoplastic nodules.  Consider attention on follow-up.   4. Ill-defined hypoattenuation of the inferior hepatic lobe and few lucent lesions in the vertebral body of L1, nonspecific but worrisome for metastatic process.   5. Left adrenal nodule.      - CT abdomen reviewed:   Few heterogeneously hypodense liver lesions suspicious for metastasis.   Indeterminate indistinct lucent lesions and L1 vertebral body and right intertrochanteric region.  Metastasis not excluded.  Further evaluation could be obtained with contrast enhanced MRI.    "   - received rocephin/azithro/prednisone; prn duonebs  - acapella and CPT ordered  - pulm & ID consulted  - thora 5/9 with 950 ml removed; pleural fluid studies suggestive of exudative process. Repeat thora today with 1 L removed; repeat CXR.  - sputum cx - normal respiratory av  - mTB PCR negative  - PJP PCP negative  - aspergillus antigen negative  - fungitell negative  - crypto antigen negative  - AFB smear negative x 3. Follow AFB cultures  - pleural fluid cultures negative  - pleural fluid cytology shows lung adenocarcinoma       - imaging suggestive of metastatic disease  - oncology consulted -  OP f/u on 5/22  - palliative care consulted - prn percocet ordered  - qualifies for home oxygen per 6 min walk test performed on 5/14; home oxygen order placed  - persistently hypoxic 5/16 - 78% on RA; repeat CXR        Hypophosphatemia  Hypophosphatemia  - phos 1.5, replaced  - daily phos    Pleural effusion, left  - unclear cause >> new onset CHF vs infection vs malignancy  - BNP <10  - trial lasix 20mg IVP  - CT chest reviewed  - pulm consulted, appreciate assistance    High cholesterol  -High cholesterol  - not on statin  - lipid panel reviewed    HTN (hypertension)  HTN (hypertension)  - not on home meds  - controlled currently    AIDS (acquired immunodeficiency syndrome), CD4 <=200  Human immunodeficiency virus (HIV) disease  AIDS  - non-adherent to ART; off since 2020  - CD4 115  - ID consulted: starting Bactrim for ppx  - ID arranging HOP clinic follow up. I would prefer that the patient present to HOP clinic prior to restarting ART       VTE Risk Mitigation (From admission, onward)           Ordered     Place WILBER hose  Until discontinued         05/08/24 2112     IP VTE LOW RISK PATIENT  Once         05/08/24 2112                    Discharge Planning   MITCH: 5/19/2024     Code Status: Full Code   Is the patient medically ready for discharge?:     Reason for patient still in hospital (select all that apply):  Patient unstable  Discharge Plan A: Home with family                  Jani Bravo MD  Department of Hospital Medicine   [facility] 11H

## 2024-05-16 NOTE — NURSING
Patient complaining for shortness of breath. He has schedule respiratory treatment. Called Respiratory team for respiratory treatment.  Waiting for them to come.

## 2024-05-16 NOTE — PLAN OF CARE
Patient alert and oriented. No distress noted. Call light within reach. Bed at lowest position. Side rails within reach.     Problem: Adult Inpatient Plan of Care  Goal: Plan of Care Review  Outcome: Progressing  Goal: Patient-Specific Goal (Individualized)  Outcome: Progressing  Goal: Absence of Hospital-Acquired Illness or Injury  Outcome: Progressing  Goal: Optimal Comfort and Wellbeing  Outcome: Progressing  Goal: Readiness for Transition of Care  Outcome: Progressing     Problem: COPD (Chronic Obstructive Pulmonary Disease)  Goal: Optimal Chronic Illness Coping  Outcome: Progressing  Goal: Optimal Level of Functional [place]  Outcome: Progressing  Goal: Absence of Infection Signs and Symptoms  Outcome: Progressing  Goal: Improved Oral Intake  Outcome: Progressing  Goal: Effective Oxygenation and Ventilation  Outcome: Progressing     Problem: Infection  Goal: Absence of Infection Signs and Symptoms  Outcome: Progressing     Problem: Coping Ineffective  Goal: Effective Coping  Outcome: Progressing

## 2024-05-16 NOTE — NURSING
Patient AAOX4. Skin intact. Patient expresses pain level in the left posterior pain 8 to 10 in the pain level scale. PA notified. Call light within reach. Bed in the lowest position.

## 2024-05-17 PROCEDURE — 99232 SBSQ HOSP IP/OBS MODERATE 35: CPT | Mod: ,,, | Performed by: INTERNAL MEDICINE

## 2024-05-17 PROCEDURE — 94664 DEMO&/EVAL PT USE INHALER: CPT

## 2024-05-17 PROCEDURE — 25000242 PHARM REV CODE 250 ALT 637 W/ HCPCS: Performed by: INTERNAL MEDICINE

## 2024-05-17 PROCEDURE — 99900035 HC TECH TIME PER 15 MIN (STAT)

## 2024-05-17 PROCEDURE — 27000646 HC AEROBIKA DEVICE

## 2024-05-17 PROCEDURE — 25000242 PHARM REV CODE 250 ALT 637 W/ HCPCS

## 2024-05-17 PROCEDURE — 94640 AIRWAY INHALATION TREATMENT: CPT

## 2024-05-17 PROCEDURE — 25000003 PHARM REV CODE 250: Performed by: INTERNAL MEDICINE

## 2024-05-17 PROCEDURE — 94799 UNLISTED PULMONARY SVC/PX: CPT

## 2024-05-17 PROCEDURE — 25000242 PHARM REV CODE 250 ALT 637 W/ HCPCS: Performed by: HOSPITALIST

## 2024-05-17 PROCEDURE — 27000221 HC OXYGEN, UP TO 24 HOURS

## 2024-05-17 PROCEDURE — 99233 SBSQ HOSP IP/OBS HIGH 50: CPT | Mod: ,,, | Performed by: STUDENT IN AN ORGANIZED HEALTH CARE EDUCATION/TRAINING PROGRAM

## 2024-05-17 PROCEDURE — 94761 N-INVAS EAR/PLS OXIMETRY MLT: CPT

## 2024-05-17 PROCEDURE — 94668 MNPJ CHEST WALL SBSQ: CPT

## 2024-05-17 PROCEDURE — 25000003 PHARM REV CODE 250: Performed by: PHYSICIAN ASSISTANT

## 2024-05-17 PROCEDURE — 25000003 PHARM REV CODE 250

## 2024-05-17 PROCEDURE — 25000003 PHARM REV CODE 250: Performed by: HOSPITALIST

## 2024-05-17 PROCEDURE — 21400001 HC TELEMETRY ROOM

## 2024-05-17 RX ORDER — IPRATROPIUM BROMIDE AND ALBUTEROL SULFATE 2.5; .5 MG/3ML; MG/3ML
3 SOLUTION RESPIRATORY (INHALATION)
Status: DISCONTINUED | OUTPATIENT
Start: 2024-05-17 | End: 2024-05-18

## 2024-05-17 RX ORDER — OXYCODONE AND ACETAMINOPHEN 5; 325 MG/1; MG/1
1 TABLET ORAL EVERY 4 HOURS PRN
Status: DISCONTINUED | OUTPATIENT
Start: 2024-05-17 | End: 2024-05-23

## 2024-05-17 RX ADMIN — SODIUM CHLORIDE SOLN NEBU 3% 4 ML: 3 NEBU SOLN at 12:05

## 2024-05-17 RX ADMIN — BENZONATATE 100 MG: 100 CAPSULE ORAL at 05:05

## 2024-05-17 RX ADMIN — SODIUM CHLORIDE SOLN NEBU 3% 4 ML: 3 NEBU SOLN at 03:05

## 2024-05-17 RX ADMIN — OXYCODONE HYDROCHLORIDE AND ACETAMINOPHEN 1 TABLET: 5; 325 TABLET ORAL at 05:05

## 2024-05-17 RX ADMIN — MELATONIN TAB 3 MG 6 MG: 3 TAB at 12:05

## 2024-05-17 RX ADMIN — IPRATROPIUM BROMIDE AND ALBUTEROL SULFATE 3 ML: 2.5; .5 SOLUTION RESPIRATORY (INHALATION) at 03:05

## 2024-05-17 RX ADMIN — IPRATROPIUM BROMIDE AND ALBUTEROL SULFATE 3 ML: 2.5; .5 SOLUTION RESPIRATORY (INHALATION) at 04:05

## 2024-05-17 RX ADMIN — GUAIFENESIN 1200 MG: 600 TABLET, EXTENDED RELEASE ORAL at 08:05

## 2024-05-17 RX ADMIN — OXYCODONE HYDROCHLORIDE AND ACETAMINOPHEN 1 TABLET: 5; 325 TABLET ORAL at 12:05

## 2024-05-17 RX ADMIN — OXYCODONE HYDROCHLORIDE AND ACETAMINOPHEN 1 TABLET: 5; 325 TABLET ORAL at 10:05

## 2024-05-17 RX ADMIN — GUAIFENESIN 1200 MG: 600 TABLET, EXTENDED RELEASE ORAL at 09:05

## 2024-05-17 RX ADMIN — SULFAMETHOXAZOLE AND TRIMETHOPRIM 1 TABLET: 800; 160 TABLET ORAL at 05:05

## 2024-05-17 RX ADMIN — BENZONATATE 100 MG: 100 CAPSULE ORAL at 09:05

## 2024-05-17 RX ADMIN — SODIUM CHLORIDE SOLN NEBU 3% 4 ML: 3 NEBU SOLN at 08:05

## 2024-05-17 RX ADMIN — IPRATROPIUM BROMIDE AND ALBUTEROL SULFATE 3 ML: 2.5; .5 SOLUTION RESPIRATORY (INHALATION) at 08:05

## 2024-05-17 RX ADMIN — OXYCODONE HYDROCHLORIDE AND ACETAMINOPHEN 1 TABLET: 5; 325 TABLET ORAL at 09:05

## 2024-05-17 RX ADMIN — BENZONATATE 100 MG: 100 CAPSULE ORAL at 08:05

## 2024-05-17 NOTE — PLAN OF CARE
SINDI met with pt and cousin, Osman at bedside to discuss housing options for pt once he is medically stable for discharge. SW inquired if pt currently has any income available. Pt reported that he does receive disability. SINDI explained to pt that she could provide him with information regarding group homes that he may be able to go to. Pt reported that he would be interested in going to a group home.    SINDI contacted [facility] ([phone]) to get more information regarding her facility; however, had to leave a message for someone to call back.    SINDI received a call from Yina [facility] regarding her group home. SINDI went to bedside for pt to speak with Yina about the group home. Yina informed pt that it would be two people to a room and the monthly rate is $700. Pt informed Yina that he will speak about with his sister and follow up with her. SINDI provided pt with Yina's contact information, as well as the list of the other group homes for him to look into.    SINDI will continue to follow up.      Annabella Smith LMSW  [facility]  Ext. [phone]

## 2024-05-17 NOTE — SUBJECTIVE & OBJECTIVE
Interval History:      Not improved today, worsening symptoms with clear reaccumulation of pleural fluid.  Patient stable but more dypneic, difficulty speaking in sentences at times.  SO NC 3L.  Desats <80% on RA.  Pulm to place pleurx on 5/20.  Adjusting pain medications.  CM working on ultimate discharge plan possibly to group home.     Review of Systems  Objective:     Vital Signs (Most Recent):  Temp: 98.2 °F (36.8 °C) (05/17/24 1555)  Pulse: 100 (05/17/24 1555)  Resp: 18 (05/17/24 1730)  BP: 128/78 (05/17/24 1555)  SpO2: (!) 93 % (05/17/24 1555) Vital Signs (24h Range):  Temp:  [97.5 °F (36.4 °C)-98.4 °F (36.9 °C)] 98.2 °F (36.8 °C)  Pulse:  [] 100  Resp:  [16-20] 18  SpO2:  [93 %-96 %] 93 %  BP: (112-132)/(68-86) 128/78     Weight: 68 kg (149 lb 14.6 oz)  Body mass index is 24.95 kg/m².    Intake/Output Summary (Last 24 hours) at 5/17/2024 1737  Last data filed at 5/17/2024 1227  Gross per 24 hour   Intake 360 ml   Output 1150 ml   Net -790 ml         Physical Exam        Significant Labs: All pertinent labs within the past 24 hours have been reviewed.  Recent Lab Results       None            Significant Imaging: I have reviewed all pertinent imaging results/findings within the past 24 hours.

## 2024-05-17 NOTE — NURSING
Pt progressing towards goals. No distress notice. No falls or injuries during shift. Bed in lowest position, call light within reach, belonging at bedside. Safety precaution maintain.Plan of Care reviewed. Pt verbalized understanding.

## 2024-05-17 NOTE — PLAN OF CARE
Problem: Adult Inpatient Plan of Care  Goal: Plan of Care Review  Outcome: Progressing  Goal: Patient-Specific Goal (Individualized)  Outcome: Progressing  Goal: Absence of Hospital-Acquired Illness or Injury  Outcome: Progressing  Goal: Optimal Comfort and Wellbeing  Outcome: Progressing  Goal: Readiness for Transition of Care  Outcome: Progressing     Problem: COPD (Chronic Obstructive Pulmonary Disease)  Goal: Optimal Chronic Illness Coping  Outcome: Progressing  Goal: Optimal Level of Functional [place]  Outcome: Progressing  Goal: Absence of Infection Signs and Symptoms  Outcome: Progressing  Goal: Improved Oral Intake  Outcome: Progressing  Goal: Effective Oxygenation and Ventilation  Outcome: Progressing     Problem: Infection  Goal: Absence of Infection Signs and Symptoms  Outcome: Progressing     Problem: Coping Ineffective  Goal: Effective Coping  Outcome: Progressing     Problem: Oncology Care  Goal: Effective Coping  Outcome: Progressing  Goal: Improved Activity Tolerance  Outcome: Progressing  Goal: Optimal Oral Intake  Outcome: Progressing  Goal: Improved Oral Mucous Membrane Integrity  Outcome: Progressing  Goal: Optimal Pain Control and Function  Outcome: Progressing     Problem: Fall Injury Risk  Goal: Absence of Fall and Fall-Related Injury  Outcome: Progressing     Pt progressing towards goals. No distress notice. No falls or injuries during shift. Bed in lowest position, call light within reach, belonging at bedside. Safety precaution maintain.Plan of Care reviewed. Pt verbalized understanding.

## 2024-05-17 NOTE — PROGRESS NOTES
[facility] [address] [place] [address] Medicine  Progress Note    Patient Name: Oscar Waldron  MRN: [id number]  Patient Class: IP- Inpatient   Admission Date: 5/8/2024  Length of Stay: 8 days  Attending Physician: Jani Bravo MD  Primary Care Provider: Kenna, Primary Doctor        Subjective:     Principal Problem:Adenocarcinoma, lung        HPI:  Oscar Waldron is a 64 y.o. male with a PMHx of cocaine abuse, HIV not on HAART, tobacco abuse, HTN who present to [facility] for evaluation of shortness of breath. Patient reports progressive worsening of shortness of breath over the past few weeks. He now has a  productive cough with associated left sided chest/ left upper abdomen pain which only occurs during coughing fits. Shortness of breath worsens while lying flat and with minimal exertion. No improvement with albuterol inhaler that he borrowed from a family member. Admits to smoking crack cocaine 2 days ago. Has >40 year hx of tobacco abuse. He stopped taking his HIV medications a few years ago.      ED: tachycardic to , improved without intervention. Satting 96% on 3L NC. BNP <10, trop 0.010. EKG with non-specifically TWAs. CXR with cardiomegaly with pulmonary vascular congestion and bilateral interstitial opacities, moderate left-sided pleural effusion with associated compressive atelectasis. Given solumedrol 125mg, azithro and rocephin.     Overview/Hospital Course:  Oscar Waldron is placed in  Observation for management of acute respiratory failure with hypoxia. Requiring supplemental oxygen on admit. CXR reviewed. Received antibiotics and IV steroids in ED. Started on oral prednisone, continuing IV rocephin and azithromycin. CT chest reviewed, concern for lung cancer and metastatic process.     Interval History:      Not improved today, worsening symptoms with clear reaccumulation of pleural fluid.  Patient stable but more dypneic, difficulty speaking in sentences at times.  SO NC 3L.  Desats <80% on RA.  Pulm to  place pleurx on 5/20.  Adjusting pain medications.  CM working on ultimate discharge plan possibly to group home.     Review of Systems  Objective:     Vital Signs (Most Recent):  Temp: 98.2 °F (36.8 °C) (05/17/24 1555)  Pulse: 100 (05/17/24 1555)  Resp: 18 (05/17/24 1730)  BP: 128/78 (05/17/24 1555)  SpO2: (!) 93 % (05/17/24 1555) Vital Signs (24h Range):  Temp:  [97.5 °F (36.4 °C)-98.4 °F (36.9 °C)] 98.2 °F (36.8 °C)  Pulse:  [] 100  Resp:  [16-20] 18  SpO2:  [93 %-96 %] 93 %  BP: (112-132)/(68-86) 128/78     Weight: 68 kg (149 lb 14.6 oz)  Body mass index is 24.95 kg/m².    Intake/Output Summary (Last 24 hours) at 5/17/2024 1737  Last data filed at 5/17/2024 1227  Gross per 24 hour   Intake 360 ml   Output 1150 ml   Net -790 ml         Physical Exam        Significant Labs: All pertinent labs within the past 24 hours have been reviewed.  Recent Lab Results       None            Significant Imaging: I have reviewed all pertinent imaging results/findings within the past 24 hours.    Assessment/Plan:      Acute respiratory failure with hypoxia  Acute respiratory failure with hypoxia  Abnormal CT of lung  Metastatic lung adenocarcinoma  Left pleural effusion  - CT chest reviewed:  1. Solid spiculated nodule in the left lung apex and irregular consolidation area in the lingula, worrisome for lung cancer.   2. Ground-glass nodule in the right middle lobe, may represent infectious/inflammatory process versus primary lung cancer.  Consider attention on follow-up studies.   3. Bilateral irregular pulmonary nodules, may be sequela of infectious/inflammatory process versus neoplastic nodules.  Consider attention on follow-up.   4. Ill-defined hypoattenuation of the inferior hepatic lobe and few lucent lesions in the vertebral body of L1, nonspecific but worrisome for metastatic process.   5. Left adrenal nodule.      - CT abdomen reviewed:   Few heterogeneously hypodense liver lesions suspicious for metastasis.    Indeterminate indistinct lucent lesions and L1 vertebral body and right intertrochanteric region.  Metastasis not excluded.  Further evaluation could be obtained with contrast enhanced MRI.      - received rocephin/azithro/prednisone; prn duonebs  - acapella and CPT ordered  - pulm & ID consulted  - thora 5/9 with 950 ml removed; pleural fluid studies suggestive of exudative process. Repeat thora today with 1 L removed; repeat CXR.  - sputum cx - normal respiratory av  - mTB PCR negative  - PJP PCP negative  - aspergillus antigen negative  - fungitell negative  - crypto antigen negative  - AFB smear negative x 3. Follow AFB cultures  - pleural fluid cultures negative  - pleural fluid cytology shows lung adenocarcinoma       - imaging suggestive of metastatic disease  - oncology consulted -  OP f/u on 5/22  - palliative care consulted - prn percocet ordered  - qualifies for home oxygen per 6 min walk test performed on 5/14; home oxygen order placed  - persistently hypoxic 5/16 - 78% on RA; repeat CXR - worsened  - Pulmonary revisit 5/17 - plan for pleurx 5/20        Hypophosphatemia  Hypophosphatemia  - phos 1.5, replaced  - daily phos    Pleural effusion, left  - unclear cause >> new onset CHF vs infection vs malignancy  - BNP <10  - trial lasix 20mg IVP  - CT chest reviewed  - pulm consulted, appreciate assistance    High cholesterol  -High cholesterol  - not on statin  - lipid panel reviewed    HTN (hypertension)  HTN (hypertension)  - not on home meds  - controlled currently    AIDS (acquired immunodeficiency syndrome), CD4 <=200  Human immunodeficiency virus (HIV) disease  AIDS  - non-adherent to ART; off since 2020  - CD4 115  - ID consulted: starting Bactrim for ppx  - ID arranging HOP clinic follow up. I would prefer that the patient present to HOP clinic prior to restarting ART       VTE Risk Mitigation (From admission, onward)           Ordered     Place WILBER hose  Until discontinued         05/08/24  2112     IP VTE LOW RISK PATIENT  Once         05/08/24 2112                    Discharge Planning   MITCH: 5/19/2024     Code Status: Full Code   Is the patient medically ready for discharge?:     Reason for patient still in hospital (select all that apply): Patient unstable  Discharge Plan A: Home with family                  Jani Bravo MD  Department of Hospital Medicine   [facility]
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
## 2024-05-17 NOTE — SUBJECTIVE & OBJECTIVE
Interval History: Yesterday, patient was hypoxic at 78% on RA. Repeat CXR showed re accumulation of pleural fluid in left lung. Pulmonology reconsulted for consideration of Pleur-x. Patient reports improvement of his dyspnea with the thoracenteses.       Objective:     Vital Signs (Most Recent):  Temp: 98.1 °F (36.7 °C) (05/17/24 1106)  Pulse: 91 (05/17/24 1106)  Resp: 18 (05/17/24 1106)  BP: 127/86 (05/17/24 1106)  SpO2: (!) 93 % (05/17/24 1106) Vital Signs (24h Range):  Temp:  [97.5 °F (36.4 °C)-98.4 °F (36.9 °C)] 98.1 °F (36.7 °C)  Pulse:  [] 91  Resp:  [16-20] 18  SpO2:  [93 %-96 %] 93 %  BP: (112-132)/(68-86) 127/86     Weight: 68 kg (149 lb 14.6 oz)  Body mass index is 24.95 kg/m².      Intake/Output Summary (Last 24 hours) at 5/17/2024 1335  Last data filed at 5/17/2024 1227  Gross per 24 hour   Intake 360 ml   Output 1150 ml   Net -790 ml        Physical Exam  Vitals and nursing note reviewed.   Constitutional:       Appearance: Normal appearance.   HENT:      Head: Atraumatic.      Right Ear: External ear normal.      Left Ear: External ear normal.   Eyes:      Extraocular Movements: Extraocular movements intact.   Cardiovascular:      Rate and Rhythm: Normal rate and regular rhythm.      Heart sounds: Normal heart sounds.   Pulmonary:      Breath sounds: Examination of the left-lower field reveals decreased breath sounds. Decreased breath sounds present.   Abdominal:      General: Abdomen is flat.      Palpations: Abdomen is soft.   Musculoskeletal:         General: Normal range of motion.   Skin:     General: Skin is warm and dry.      Capillary Refill: Capillary refill takes less than 2 seconds.   Neurological:      General: No focal deficit present.      Mental Status: He is alert and oriented to person, place, and time.   Psychiatric:         Mood and Affect: Mood normal.         Behavior: Behavior normal.           Review of Systems   Respiratory:  Positive for cough and shortness of breath.   "  Cardiovascular:  Negative for chest pain and palpitations.   Gastrointestinal:  Positive for nausea.       Vents:  Oxygen Concentration (%): 28 (05/15/24 1611)    Lines/Drains/Airways       Peripheral Intravenous Line  Duration                  Peripheral IV - Single Lumen 05/08/24 18 G Right Wrist 9 days                    Significant Labs:    CBC/Anemia Profile:  No results for input(s): "WBC", "HGB", "HCT", "PLT", "MCV", "RDW", "IRON", "FERRITIN", "RETIC", "FOLATE", "[id number]", "OCCULTBLOOD" in the last 48 hours.     Chemistries:  No results for input(s): "NA", "K", "CL", "CO2", "BUN", "CREATININE", "CALCIUM", "ALBUMIN", "PROT", "BILITOT", "ALKPHOS", "ALT", "AST", "GLUCOSE", "MG", "PHOS" in the last 48 hours.    All pertinent labs within the past 24 hours have been reviewed.    Significant Imaging:  I have reviewed all pertinent imaging results/findings within the past 24 hours.  "
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
## 2024-05-17 NOTE — SUBJECTIVE & OBJECTIVE
Interval History: Chart reviewed including 24h medication use. Patient sitting on side of bed, coughing, He notes persistent chest discomfort and pain which is improved with PRN oxycodone. Dispo remains unclear.     Palliative ROS:  PRNs: oxycodone 5-APAP x3 (22 OME)  Pain + (chest discomfort, constant aching pain, worse with deep breathing; now with some new pain around thora site; notes oxycodone helps significantly)  Dyspnea +  Nausea -  Anxiety -  Agitation -      No past medical history on file.    No past surgical history on file.    Review of patient's allergies indicates:  No Known Allergies    Medications:  Continuous Infusions:  Scheduled Meds:   benzonatate  100 mg Oral TID    guaiFENesin  1,200 mg Oral BID    sodium chloride 3%  4 mL Nebulization Q8H    sulfamethoxazole-trimethoprim 800-160mg  1 tablet Oral Every Mon, Wed, Fri     PRN Meds:  Current Facility-Administered Medications:     albuterol-ipratropium, 3 mL, Nebulization, Q6H PRN    bisacodyL, 10 mg, Rectal, Daily PRN    dextrose 10%, 12.5 g, Intravenous, PRN    dextrose 10%, 25 g, Intravenous, PRN    glucagon (human recombinant), 1 mg, Intramuscular, PRN    glucose, 16 g, Oral, PRN    glucose, 24 g, Oral, PRN    hydrOXYzine HCL, 10 mg, Oral, TID PRN    iohexol, 15 mL, Oral, PRN    melatonin, 6 mg, Oral, Nightly PRN    ondansetron, 8 mg, Oral, Q8H PRN    oxyCODONE-acetaminophen, 1 tablet, Oral, Q4H PRN    polyethylene glycol, 17 g, Oral, Daily PRN    promethazine, 25 mg, Oral, Q6H PRN    Family History    None       Tobacco Use    Smoking status: Not on file    Smokeless tobacco: Not on file   Substance and Sexual Activity    Alcohol use: Not on file    Drug use: Not on file    Sexual activity: Not on file         Objective:     Vital Signs (Most Recent):  Temp: 98.1 °F (36.7 °C) (05/17/24 1106)  Pulse: 91 (05/17/24 1106)  Resp: 18 (05/17/24 1106)  BP: 127/86 (05/17/24 1106)  SpO2: (!) 93 % (05/17/24 1106) Vital Signs (24h Range):  Temp:  [97.5  "°F (36.4 °C)-98.4 °F (36.9 °C)] 98.1 °F (36.7 °C)  Pulse:  [] 91  Resp:  [16-20] 18  SpO2:  [93 %-96 %] 93 %  BP: (112-132)/(68-86) 127/86     Weight: 68 kg (149 lb 14.6 oz)  Body mass index is 24.95 kg/m².       Physical Exam  Vitals and nursing note reviewed.   Constitutional:       Comments: Older, thin, ill-appearing male, conversant; occasional grimacing    Eyes:      Extraocular Movements: Extraocular movements intact.   Pulmonary:      Comments: Mild respiratory distress s/p thora, able to speak in full sentences  Abdominal:      General: Abdomen is flat.      Palpations: Abdomen is soft.   Skin:     General: Skin is warm and dry.   Neurological:      General: No focal deficit present.      Mental Status: He is oriented to person, place, and time. Mental status is at baseline.   Psychiatric:         Mood and Affect: Mood normal.         Behavior: Behavior normal.         Thought Content: Thought content normal.         Judgment: Judgment normal.                       CBC:   Recent Labs   Lab 05/13/24  0653   WBC 11.08   HGB 16.4   HCT 52.6   MCV 90        BMP:  No results for input(s): "GLU", "NA", "K", "CL", "CO2", "BUN", "CREATININE", "CALCIUM", "MG" in the last 24 hours.  LFT:  Lab Results   Component Value Date    AST 27 05/08/2024    ALKPHOS 98 05/08/2024    BILITOT 0.2 05/08/2024     Albumin:   Albumin   Date Value Ref Range Status   05/08/2024 2.9 (L) 3.5 - 5.2 g/dL Final     Protein:   Total Protein   Date Value Ref Range Status   05/08/2024 6.9 6.0 - 8.4 g/dL Final     Lactic acid:   No results found for: "LACTATE"    Reviewed CBC with stable blood counts      "

## 2024-05-17 NOTE — PLAN OF CARE
Problem: Adult Inpatient Plan of Care  Goal: Plan of Care Review  Outcome: Progressing  Goal: Patient-Specific Goal (Individualized)  Outcome: Progressing  Goal: Absence of Hospital-Acquired Illness or Injury  Outcome: Progressing  Goal: Optimal Comfort and Wellbeing  Outcome: Progressing  Goal: Readiness for Transition of Care  Outcome: Progressing     Problem: COPD (Chronic Obstructive Pulmonary Disease)  Goal: Optimal Chronic Illness Coping  Outcome: Progressing  Goal: Optimal Level of Functional [place]  Outcome: Progressing  Goal: Absence of Infection Signs and Symptoms  Outcome: Progressing  Goal: Improved Oral Intake  Outcome: Progressing  Goal: Effective Oxygenation and Ventilation  Outcome: Progressing     Problem: Infection  Goal: Absence of Infection Signs and Symptoms  Outcome: Progressing     Problem: Coping Ineffective  Goal: Effective Coping  Outcome: Progressing     Problem: HIV/AIDS Infection  Goal: HIV/AIDS Symptom Control  Outcome: Progressing     Problem: Gas Exchange Impaired  Goal: Optimal Gas Exchange  Outcome: Progressing     Problem: Comorbidity Management  Goal: Blood Pressure in Desired Range  Outcome: Progressing     Problem: Electrolyte Imbalance  Goal: Electrolyte Balance  Outcome: Progressing     Problem: Oncology Care  Goal: Effective Coping  Outcome: Progressing  Goal: Improved Activity Tolerance  Outcome: Progressing  Goal: Optimal Oral Intake  Outcome: Progressing  Goal: Improved Oral Mucous Membrane Integrity  Outcome: Progressing  Goal: Optimal Pain Control and Function  Outcome: Progressing     Problem: Fall Injury Risk  Goal: Absence of Fall and Fall-Related Injury  Outcome: Progressing     Problem: Skin Injury Risk Increased  Goal: Skin Health and Integrity  Outcome: Progressing

## 2024-05-17 NOTE — NURSING
Pt is AAOx4.  Pt stable no complaints of any at this time,No distress noted. Has dressing from procedure old drainage. 2 liters  of O2.Call light in reach. Bed in low locked position. Side rails x2. Belongings at bedside.Pt free of fall and injuries Questions and concerns voiced and answered.Plan of care continues.

## 2024-05-17 NOTE — ASSESSMENT & PLAN NOTE
Acute respiratory failure with hypoxia  Abnormal CT of lung  Metastatic lung adenocarcinoma  Left pleural effusion  - CT chest reviewed:  1. Solid spiculated nodule in the left lung apex and irregular consolidation area in the lingula, worrisome for lung cancer.   2. Ground-glass nodule in the right middle lobe, may represent infectious/inflammatory process versus primary lung cancer.  Consider attention on follow-up studies.   3. Bilateral irregular pulmonary nodules, may be sequela of infectious/inflammatory process versus neoplastic nodules.  Consider attention on follow-up.   4. Ill-defined hypoattenuation of the inferior hepatic lobe and few lucent lesions in the vertebral body of L1, nonspecific but worrisome for metastatic process.   5. Left adrenal nodule.      - CT abdomen reviewed:   Few heterogeneously hypodense liver lesions suspicious for metastasis.   Indeterminate indistinct lucent lesions and L1 vertebral body and right intertrochanteric region.  Metastasis not excluded.  Further evaluation could be obtained with contrast enhanced MRI.      - received rocephin/azithro/prednisone; prn duonebs  - acapella and CPT ordered  - pulm & ID consulted  - thora 5/9 with 950 ml removed; pleural fluid studies suggestive of exudative process. Repeat thora today with 1 L removed; repeat CXR.  - sputum cx - normal respiratory av  - mTB PCR negative  - PJP PCP negative  - aspergillus antigen negative  - fungitell negative  - crypto antigen negative  - AFB smear negative x 3. Follow AFB cultures  - pleural fluid cultures negative  - pleural fluid cytology shows lung adenocarcinoma       - imaging suggestive of metastatic disease  - oncology consulted -  OP f/u on 5/22  - palliative care consulted - prn percocet ordered  - qualifies for home oxygen per 6 min walk test performed on 5/14; home oxygen order placed  - persistently hypoxic 5/16 - 78% on RA; repeat CXR - worsened  - Pulmonary revisit 5/17 - plan for  pleurx 5/20

## 2024-05-17 NOTE — ASSESSMENT & PLAN NOTE
64m with untreated HIV and tobacco abuse who has new diagnosis of advanced lung adenocarcinoma with likely liver and spine mets. Likely weight loss ~25lbs since 2020 per outside records.    5/17 pleural fluid continuing to reaccumulate, pulm reconsulted    -plans to establish with oncology, appt with Dr Camacho 5/22
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
## 2024-05-17 NOTE — PLAN OF CARE
Advance Care Planning   [facility]  Palliative Care       Patient Name: Oscar Waldron  MRN: [id number]  Admission Date: 5/8/2024  Hospital Length of Stay: 8 days  Code Status: Full Code   Attending Provider: Jani Bravo MD  Palliative Care Provider: Kyaw Owens MD  Primary Care Physician: No, Primary Doctor  Principal Problem:Adenocarcinoma, lung     Advance Care Planning     Date: 05/17/2024  Patient as selected his sister Ruby Beckman [phone] as his surrogate decision maker. Patient has selected Osman Ordonez [phone], first cousin, has an alternate surrogate decision maker in the even Ruby is unable to be contacted.  will follow for support.     Ariel Centeno LCSW  Palliative Medicine

## 2024-05-18 LAB — TROPONIN I SERPL DL<=0.01 NG/ML-MCNC: 0.08 NG/ML (ref 0–0.03)

## 2024-05-18 PROCEDURE — 25000242 PHARM REV CODE 250 ALT 637 W/ HCPCS: Performed by: HOSPITALIST

## 2024-05-18 PROCEDURE — 99900035 HC TECH TIME PER 15 MIN (STAT)

## 2024-05-18 PROCEDURE — 94640 AIRWAY INHALATION TREATMENT: CPT

## 2024-05-18 PROCEDURE — 94799 UNLISTED PULMONARY SVC/PX: CPT

## 2024-05-18 PROCEDURE — 27000646 HC AEROBIKA DEVICE

## 2024-05-18 PROCEDURE — 94664 DEMO&/EVAL PT USE INHALER: CPT

## 2024-05-18 PROCEDURE — 21400001 HC TELEMETRY ROOM

## 2024-05-18 PROCEDURE — 94761 N-INVAS EAR/PLS OXIMETRY MLT: CPT

## 2024-05-18 PROCEDURE — 94668 MNPJ CHEST WALL SBSQ: CPT

## 2024-05-18 PROCEDURE — 25000242 PHARM REV CODE 250 ALT 637 W/ HCPCS: Performed by: INTERNAL MEDICINE

## 2024-05-18 PROCEDURE — 93005 ELECTROCARDIOGRAM TRACING: CPT

## 2024-05-18 PROCEDURE — 94799 UNLISTED PULMONARY SVC/PX: CPT | Mod: XB

## 2024-05-18 PROCEDURE — 84484 ASSAY OF TROPONIN QUANT: CPT | Performed by: PHYSICIAN ASSISTANT

## 2024-05-18 PROCEDURE — 25000003 PHARM REV CODE 250

## 2024-05-18 PROCEDURE — 25000003 PHARM REV CODE 250: Performed by: HOSPITALIST

## 2024-05-18 PROCEDURE — 25000003 PHARM REV CODE 250: Performed by: INTERNAL MEDICINE

## 2024-05-18 PROCEDURE — 93010 ELECTROCARDIOGRAM REPORT: CPT | Mod: ,,, | Performed by: INTERNAL MEDICINE

## 2024-05-18 PROCEDURE — 27000221 HC OXYGEN, UP TO 24 HOURS

## 2024-05-18 PROCEDURE — 25000242 PHARM REV CODE 250 ALT 637 W/ HCPCS

## 2024-05-18 PROCEDURE — 36415 COLL VENOUS BLD VENIPUNCTURE: CPT | Performed by: PHYSICIAN ASSISTANT

## 2024-05-18 RX ORDER — IPRATROPIUM BROMIDE AND ALBUTEROL SULFATE 2.5; .5 MG/3ML; MG/3ML
3 SOLUTION RESPIRATORY (INHALATION) EVERY 6 HOURS PRN
Status: DISCONTINUED | OUTPATIENT
Start: 2024-05-18 | End: 2024-05-29 | Stop reason: HOSPADM

## 2024-05-18 RX ORDER — IPRATROPIUM BROMIDE AND ALBUTEROL SULFATE 2.5; .5 MG/3ML; MG/3ML
3 SOLUTION RESPIRATORY (INHALATION) EVERY 4 HOURS
Status: DISCONTINUED | OUTPATIENT
Start: 2024-05-18 | End: 2024-05-26

## 2024-05-18 RX ADMIN — GUAIFENESIN 1200 MG: 600 TABLET, EXTENDED RELEASE ORAL at 08:05

## 2024-05-18 RX ADMIN — SODIUM CHLORIDE SOLN NEBU 3% 4 ML: 3 NEBU SOLN at 12:05

## 2024-05-18 RX ADMIN — BENZONATATE 100 MG: 100 CAPSULE ORAL at 08:05

## 2024-05-18 RX ADMIN — IPRATROPIUM BROMIDE AND ALBUTEROL SULFATE 3 ML: 2.5; .5 SOLUTION RESPIRATORY (INHALATION) at 04:05

## 2024-05-18 RX ADMIN — OXYCODONE HYDROCHLORIDE AND ACETAMINOPHEN 1 TABLET: 5; 325 TABLET ORAL at 03:05

## 2024-05-18 RX ADMIN — IPRATROPIUM BROMIDE AND ALBUTEROL SULFATE 3 ML: 2.5; .5 SOLUTION RESPIRATORY (INHALATION) at 08:05

## 2024-05-18 RX ADMIN — HYDROXYZINE HYDROCHLORIDE 10 MG: 10 TABLET ORAL at 03:05

## 2024-05-18 RX ADMIN — OXYCODONE HYDROCHLORIDE AND ACETAMINOPHEN 1 TABLET: 5; 325 TABLET ORAL at 08:05

## 2024-05-18 RX ADMIN — BENZONATATE 100 MG: 100 CAPSULE ORAL at 03:05

## 2024-05-18 RX ADMIN — BENZONATATE 100 MG: 100 CAPSULE ORAL at 09:05

## 2024-05-18 RX ADMIN — IPRATROPIUM BROMIDE AND ALBUTEROL SULFATE 3 ML: 2.5; .5 SOLUTION RESPIRATORY (INHALATION) at 03:05

## 2024-05-18 RX ADMIN — SODIUM CHLORIDE SOLN NEBU 3% 4 ML: 3 NEBU SOLN at 03:05

## 2024-05-18 RX ADMIN — SODIUM CHLORIDE SOLN NEBU 3% 4 ML: 3 NEBU SOLN at 08:05

## 2024-05-18 NOTE — SUBJECTIVE & OBJECTIVE
Interval History:      Stable dyspnea, remains O2 dependent.  Nebs now scheduled around the clock due to need overnight.  NPO at [facility] tomorrow (Monday 0001) for pleurx cath.      Review of Systems   Constitutional:  Positive for fatigue. Negative for chills and fever.   HENT:  Negative for congestion and sore throat.    Eyes:  Negative for pain.   Respiratory:  Positive for cough and shortness of breath.    Cardiovascular:  Negative for chest pain, palpitations and leg swelling.   Gastrointestinal:  Negative for abdominal pain, constipation, diarrhea, nausea and vomiting.   Genitourinary:  Negative for difficulty urinating, dysuria and hematuria.   Musculoskeletal:  Negative for back pain.   Skin:  Negative for rash.   Neurological:  Negative for light-headedness and headaches.   Hematological:  Does not bruise/bleed easily.   Psychiatric/Behavioral:  Negative for agitation.      Objective:     Vital Signs (Most Recent):  Temp: 98.6 °F (37 °C) (05/18/24 1126)  Pulse: 94 (05/18/24 1126)  Resp: 17 (05/18/24 1126)  BP: 133/82 (05/18/24 1126)  SpO2: 96 % (05/18/24 0811) Vital Signs (24h Range):  Temp:  [97.8 °F (36.6 °C)-98.6 °F (37 °C)] 98.6 °F (37 °C)  Pulse:  [] 94  Resp:  [17-20] 17  SpO2:  [92 %-97 %] 96 %  BP: (128-151)/(78-94) 133/82     Weight: 68 kg (149 lb 14.6 oz)  Body mass index is 24.95 kg/m².    Intake/Output Summary (Last 24 hours) at 5/18/2024 1405  Last data filed at 5/18/2024 0642  Gross per 24 hour   Intake 330 ml   Output 600 ml   Net -270 ml         Physical Exam  Vitals and nursing note reviewed.   Constitutional:       Comments: Older, thin, ill-appearing male, conversant; occasional grimacing    Eyes:      Extraocular Movements: Extraocular movements intact.   Pulmonary:      Comments: frequent cough interrupting ability to speak in full sentences. Diminished breath sounds L all fields.  Bases more diminished   Abdominal:      General: Abdomen is flat.      Palpations: Abdomen is soft.    Skin:     General: Skin is warm and dry.   Neurological:      General: No focal deficit present.      Mental Status: He is oriented to person, place, and time. Mental status is at baseline.   Psychiatric:         Mood and Affect: Mood normal.         Behavior: Behavior normal.         Thought Content: Thought content normal.         Judgment: Judgment normal.             Significant Labs: All pertinent labs within the past 24 hours have been reviewed.  Recent Lab Results       None            Significant Imaging: I have reviewed all pertinent imaging results/findings within the past 24 hours.

## 2024-05-18 NOTE — PROGRESS NOTES
[facility] [address] [place] [address] Medicine  Progress Note    Patient Name: Oscar Waldron  MRN: [id number]  Patient Class: IP- Inpatient   Admission Date: 5/8/2024  Length of Stay: 9 days  Attending Physician: Jani Bravo MD  Primary Care Provider: Kenna, Primary Doctor        Subjective:     Principal Problem:Adenocarcinoma, lung        HPI:  Oscar Waldron is a 64 y.o. male with a PMHx of cocaine abuse, HIV not on HAART, tobacco abuse, HTN who present to [facility] for evaluation of shortness of breath. Patient reports progressive worsening of shortness of breath over the past few weeks. He now has a  productive cough with associated left sided chest/ left upper abdomen pain which only occurs during coughing fits. Shortness of breath worsens while lying flat and with minimal exertion. No improvement with albuterol inhaler that he borrowed from a family member. Admits to smoking crack cocaine 2 days ago. Has >40 year hx of tobacco abuse. He stopped taking his HIV medications a few years ago.      ED: tachycardic to , improved without intervention. Satting 96% on 3L NC. BNP <10, trop 0.010. EKG with non-specifically TWAs. CXR with cardiomegaly with pulmonary vascular congestion and bilateral interstitial opacities, moderate left-sided pleural effusion with associated compressive atelectasis. Given solumedrol 125mg, azithro and rocephin.     Overview/Hospital Course:  Oscar Waldron is placed in  Observation for management of acute respiratory failure with hypoxia. Requiring supplemental oxygen on admit. CXR reviewed. Received antibiotics and IV steroids in ED. Started on oral prednisone, continuing IV rocephin and azithromycin. CT chest reviewed, concern for lung cancer and metastatic process.     Interval History:      Stable dyspnea, remains O2 dependent.  Nebs now scheduled around the clock due to need overnight.  NPO at [facility] tomorrow (Monday 0001) for pleurx cath.      Review of Systems   Constitutional:  Positive  for fatigue. Negative for chills and fever.   HENT:  Negative for congestion and sore throat.    Eyes:  Negative for pain.   Respiratory:  Positive for cough and shortness of breath.    Cardiovascular:  Negative for chest pain, palpitations and leg swelling.   Gastrointestinal:  Negative for abdominal pain, constipation, diarrhea, nausea and vomiting.   Genitourinary:  Negative for difficulty urinating, dysuria and hematuria.   Musculoskeletal:  Negative for back pain.   Skin:  Negative for rash.   Neurological:  Negative for light-headedness and headaches.   Hematological:  Does not bruise/bleed easily.   Psychiatric/Behavioral:  Negative for agitation.      Objective:     Vital Signs (Most Recent):  Temp: 98.6 °F (37 °C) (05/18/24 1126)  Pulse: 94 (05/18/24 1126)  Resp: 17 (05/18/24 1126)  BP: 133/82 (05/18/24 1126)  SpO2: 96 % (05/18/24 0811) Vital Signs (24h Range):  Temp:  [97.8 °F (36.6 °C)-98.6 °F (37 °C)] 98.6 °F (37 °C)  Pulse:  [] 94  Resp:  [17-20] 17  SpO2:  [92 %-97 %] 96 %  BP: (128-151)/(78-94) 133/82     Weight: 68 kg (149 lb 14.6 oz)  Body mass index is 24.95 kg/m².    Intake/Output Summary (Last 24 hours) at 5/18/2024 1405  Last data filed at 5/18/2024 0642  Gross per 24 hour   Intake 330 ml   Output 600 ml   Net -270 ml         Physical Exam  Vitals and nursing note reviewed.   Constitutional:       Comments: Older, thin, ill-appearing male, conversant; occasional grimacing    Eyes:      Extraocular Movements: Extraocular movements intact.   Pulmonary:      Comments: frequent cough interrupting ability to speak in full sentences. Diminished breath sounds L all fields.  Bases more diminished   Abdominal:      General: Abdomen is flat.      Palpations: Abdomen is soft.   Skin:     General: Skin is warm and dry.   Neurological:      General: No focal deficit present.      Mental Status: He is oriented to person, place, and time. Mental status is at baseline.   Psychiatric:         Mood and  Affect: Mood normal.         Behavior: Behavior normal.         Thought Content: Thought content normal.         Judgment: Judgment normal.             Significant Labs: All pertinent labs within the past 24 hours have been reviewed.  Recent Lab Results       None            Significant Imaging: I have reviewed all pertinent imaging results/findings within the past 24 hours.    Assessment/Plan:      Acute respiratory failure with hypoxia  Acute respiratory failure with hypoxia  Abnormal CT of lung  Metastatic lung adenocarcinoma  Left pleural effusion  - CT chest reviewed:  1. Solid spiculated nodule in the left lung apex and irregular consolidation area in the lingula, worrisome for lung cancer.   2. Ground-glass nodule in the right middle lobe, may represent infectious/inflammatory process versus primary lung cancer.  Consider attention on follow-up studies.   3. Bilateral irregular pulmonary nodules, may be sequela of infectious/inflammatory process versus neoplastic nodules.  Consider attention on follow-up.   4. Ill-defined hypoattenuation of the inferior hepatic lobe and few lucent lesions in the vertebral body of L1, nonspecific but worrisome for metastatic process.   5. Left adrenal nodule.      - CT abdomen reviewed:   Few heterogeneously hypodense liver lesions suspicious for metastasis.   Indeterminate indistinct lucent lesions and L1 vertebral body and right intertrochanteric region.  Metastasis not excluded.  Further evaluation could be obtained with contrast enhanced MRI.      - received rocephin/azithro/prednisone; prn duonebs  - acapella and CPT ordered  - pulm & ID consulted  - thora 5/9 with 950 ml removed; pleural fluid studies suggestive of exudative process. Repeat thora today with 1 L removed; repeat CXR.  - sputum cx - normal respiratory av  - mTB PCR negative  - PJP PCP negative  - aspergillus antigen negative  - fungitell negative  - crypto antigen negative  - AFB smear negative x 3.  Follow AFB cultures  - pleural fluid cultures negative  - pleural fluid cytology shows lung adenocarcinoma       - imaging suggestive of metastatic disease  - oncology consulted -  OP f/u on 5/22  - palliative care consulted - prn percocet ordered  - qualifies for home oxygen per 6 min walk test performed on 5/14; home oxygen order placed  - persistently hypoxic 5/16 - 78% on RA; repeat CXR - worsened  - Pulmonary revisit 5/17 - plan for pleurx 5/20        Hypophosphatemia  Hypophosphatemia  - phos 1.5, replaced  - daily phos    Pleural effusion, left  - unclear cause >> new onset CHF vs infection vs malignancy  - BNP <10  - trial lasix 20mg IVP  - CT chest reviewed  - pulm consulted, appreciate assistance    High cholesterol  -High cholesterol  - not on statin  - lipid panel reviewed    HTN (hypertension)  HTN (hypertension)  - not on home meds  - controlled currently    AIDS (acquired immunodeficiency syndrome), CD4 <=200  Human immunodeficiency virus (HIV) disease  AIDS  - non-adherent to ART; off since 2020  - CD4 115  - ID consulted: starting Bactrim for ppx  - ID arranging HOP clinic follow up. I would prefer that the patient present to HOP clinic prior to restarting ART       VTE Risk Mitigation (From admission, onward)           Ordered     Place WILBER hose  Until discontinued         05/08/24 2112     IP VTE LOW RISK PATIENT  Once         05/08/24 2112                    Discharge Planning   MITCH: 5/19/2024     Code Status: Full Code   Is the patient medically ready for discharge?:     Reason for patient still in hospital (select all that apply): Patient unstable  Discharge Plan A: Home with family                  Jani Bravo MD  Department of Hospital Medicine   [facility]

## 2024-05-18 NOTE — PLAN OF CARE
Problem: Adult Inpatient Plan of Care  Goal: Plan of Care Review  Outcome: Progressing  Goal: Patient-Specific Goal (Individualized)  Outcome: Progressing  Goal: Absence of Hospital-Acquired Illness or Injury  Outcome: Progressing  Goal: Optimal Comfort and Wellbeing  Outcome: Progressing  Goal: Readiness for Transition of Care  Outcome: Progressing     Problem: COPD (Chronic Obstructive Pulmonary Disease)  Goal: Optimal Chronic Illness Coping  Outcome: Progressing  Goal: Optimal Level of Functional [place]  Outcome: Progressing  Goal: Absence of Infection Signs and Symptoms  Outcome: Progressing  Goal: Improved Oral Intake  Outcome: Progressing  Goal: Effective Oxygenation and Ventilation  Outcome: Progressing     Problem: Infection  Goal: Absence of Infection Signs and Symptoms  Outcome: Progressing     Problem: Coping Ineffective  Goal: Effective Coping  Outcome: Progressing     Problem: HIV/AIDS Infection  Goal: HIV/AIDS Symptom Control  Outcome: Progressing     Problem: Gas Exchange Impaired  Goal: Optimal Gas Exchange  Outcome: Progressing     Problem: Comorbidity Management  Goal: Blood Pressure in Desired Range  Outcome: Progressing     Problem: Electrolyte Imbalance  Goal: Electrolyte Balance  Outcome: Progressing     Problem: Oncology Care  Goal: Effective Coping  Outcome: Progressing  Goal: Improved Activity Tolerance  Outcome: Progressing  Goal: Optimal Oral Intake  Outcome: Progressing  Goal: Improved Oral Mucous Membrane Integrity  Outcome: Progressing  Goal: Optimal Pain Control and Function  Outcome: Progressing     Problem: Fall Injury Risk  Goal: Absence of Fall and Fall-Related Injury  Outcome: Progressing     Problem: Skin Injury Risk Increased  Goal: Skin Health and Integrity  Outcome: Progressing

## 2024-05-18 NOTE — NURSING
Pt requesting a Resp Treatment. RT are only ordered wa, his prn treatments were dced yesterday.Sweta PA notified for a 1x order.RT notified of need for Treatment.

## 2024-05-19 LAB
BASOPHILS # BLD AUTO: 0.07 K/UL (ref 0–0.2)
BASOPHILS NFR BLD: 0.6 % (ref 0–1.9)
DIFFERENTIAL METHOD BLD: ABNORMAL
EOSINOPHIL # BLD AUTO: 0.3 K/UL (ref 0–0.5)
EOSINOPHIL NFR BLD: 2 % (ref 0–8)
ERYTHROCYTE [DISTWIDTH] IN BLOOD BY AUTOMATED COUNT: 13.8 % (ref 11.5–14.5)
HCT VFR BLD AUTO: 49.2 % (ref 40–54)
HGB BLD-MCNC: 15.7 G/DL (ref 14–18)
IMM GRANULOCYTES # BLD AUTO: 0.1 K/UL (ref 0–0.04)
IMM GRANULOCYTES NFR BLD AUTO: 0.8 % (ref 0–0.5)
LACTATE SERPL-SCNC: 1.7 MMOL/L (ref 0.5–2.2)
LYMPHOCYTES # BLD AUTO: 1.3 K/UL (ref 1–4.8)
LYMPHOCYTES NFR BLD: 10.3 % (ref 18–48)
MCH RBC QN AUTO: 28.5 PG (ref 27–31)
MCHC RBC AUTO-ENTMCNC: 31.9 G/DL (ref 32–36)
MCV RBC AUTO: 89 FL (ref 82–98)
MONOCYTES # BLD AUTO: 1.4 K/UL (ref 0.3–1)
MONOCYTES NFR BLD: 11.4 % (ref 4–15)
NEUTROPHILS # BLD AUTO: 9.5 K/UL (ref 1.8–7.7)
NEUTROPHILS NFR BLD: 74.9 % (ref 38–73)
NRBC BLD-RTO: 0 /100 WBC
OHS QRS DURATION: 84 MS
OHS QTC CALCULATION: 417 MS
PLATELET # BLD AUTO: 183 K/UL (ref 150–450)
PMV BLD AUTO: 8.7 FL (ref 9.2–12.9)
RBC # BLD AUTO: 5.51 M/UL (ref 4.6–6.2)
TROPONIN I SERPL DL<=0.01 NG/ML-MCNC: 0.23 NG/ML (ref 0–0.03)
TROPONIN I SERPL DL<=0.01 NG/ML-MCNC: 0.25 NG/ML (ref 0–0.03)
WBC # BLD AUTO: 12.67 K/UL (ref 3.9–12.7)

## 2024-05-19 PROCEDURE — 0W9B3ZZ DRAINAGE OF LEFT PLEURAL CAVITY, PERCUTANEOUS APPROACH: ICD-10-PCS | Performed by: INTERNAL MEDICINE

## 2024-05-19 PROCEDURE — 83605 ASSAY OF LACTIC ACID: CPT | Performed by: INTERNAL MEDICINE

## 2024-05-19 PROCEDURE — 27000646 HC AEROBIKA DEVICE

## 2024-05-19 PROCEDURE — 94664 DEMO&/EVAL PT USE INHALER: CPT

## 2024-05-19 PROCEDURE — 94761 N-INVAS EAR/PLS OXIMETRY MLT: CPT

## 2024-05-19 PROCEDURE — 85025 COMPLETE CBC W/AUTO DIFF WBC: CPT | Performed by: STUDENT IN AN ORGANIZED HEALTH CARE EDUCATION/TRAINING PROGRAM

## 2024-05-19 PROCEDURE — 25000242 PHARM REV CODE 250 ALT 637 W/ HCPCS: Performed by: HOSPITALIST

## 2024-05-19 PROCEDURE — 99900035 HC TECH TIME PER 15 MIN (STAT)

## 2024-05-19 PROCEDURE — 36415 COLL VENOUS BLD VENIPUNCTURE: CPT | Mod: XB | Performed by: INTERNAL MEDICINE

## 2024-05-19 PROCEDURE — 36415 COLL VENOUS BLD VENIPUNCTURE: CPT | Performed by: PHYSICIAN ASSISTANT

## 2024-05-19 PROCEDURE — 25000003 PHARM REV CODE 250: Performed by: INTERNAL MEDICINE

## 2024-05-19 PROCEDURE — 25000242 PHARM REV CODE 250 ALT 637 W/ HCPCS: Performed by: STUDENT IN AN ORGANIZED HEALTH CARE EDUCATION/TRAINING PROGRAM

## 2024-05-19 PROCEDURE — 94640 AIRWAY INHALATION TREATMENT: CPT

## 2024-05-19 PROCEDURE — 94668 MNPJ CHEST WALL SBSQ: CPT

## 2024-05-19 PROCEDURE — 21400001 HC TELEMETRY ROOM

## 2024-05-19 PROCEDURE — 27000221 HC OXYGEN, UP TO 24 HOURS

## 2024-05-19 PROCEDURE — 25000003 PHARM REV CODE 250

## 2024-05-19 PROCEDURE — 36415 COLL VENOUS BLD VENIPUNCTURE: CPT | Performed by: STUDENT IN AN ORGANIZED HEALTH CARE EDUCATION/TRAINING PROGRAM

## 2024-05-19 PROCEDURE — 25000242 PHARM REV CODE 250 ALT 637 W/ HCPCS: Performed by: INTERNAL MEDICINE

## 2024-05-19 PROCEDURE — 84484 ASSAY OF TROPONIN QUANT: CPT | Mod: 91 | Performed by: PHYSICIAN ASSISTANT

## 2024-05-19 RX ORDER — IPRATROPIUM BROMIDE AND ALBUTEROL SULFATE 2.5; .5 MG/3ML; MG/3ML
3 SOLUTION RESPIRATORY (INHALATION) ONCE
Status: COMPLETED | OUTPATIENT
Start: 2024-05-19 | End: 2024-05-19

## 2024-05-19 RX ADMIN — IPRATROPIUM BROMIDE AND ALBUTEROL SULFATE 3 ML: 2.5; .5 SOLUTION RESPIRATORY (INHALATION) at 08:05

## 2024-05-19 RX ADMIN — SODIUM CHLORIDE SOLN NEBU 3% 4 ML: 3 NEBU SOLN at 02:05

## 2024-05-19 RX ADMIN — SODIUM CHLORIDE SOLN NEBU 3% 4 ML: 3 NEBU SOLN at 10:05

## 2024-05-19 RX ADMIN — OXYCODONE HYDROCHLORIDE AND ACETAMINOPHEN 1 TABLET: 5; 325 TABLET ORAL at 08:05

## 2024-05-19 RX ADMIN — BENZONATATE 100 MG: 100 CAPSULE ORAL at 09:05

## 2024-05-19 RX ADMIN — IPRATROPIUM BROMIDE AND ALBUTEROL SULFATE 3 ML: 2.5; .5 SOLUTION RESPIRATORY (INHALATION) at 04:05

## 2024-05-19 RX ADMIN — GUAIFENESIN 1200 MG: 600 TABLET, EXTENDED RELEASE ORAL at 09:05

## 2024-05-19 RX ADMIN — IPRATROPIUM BROMIDE AND ALBUTEROL SULFATE 3 ML: 2.5; .5 SOLUTION RESPIRATORY (INHALATION) at 10:05

## 2024-05-19 RX ADMIN — GUAIFENESIN 1200 MG: 600 TABLET, EXTENDED RELEASE ORAL at 08:05

## 2024-05-19 RX ADMIN — OXYCODONE HYDROCHLORIDE AND ACETAMINOPHEN 1 TABLET: 5; 325 TABLET ORAL at 09:05

## 2024-05-19 RX ADMIN — IPRATROPIUM BROMIDE AND ALBUTEROL SULFATE 3 ML: 2.5; .5 SOLUTION RESPIRATORY (INHALATION) at 02:05

## 2024-05-19 RX ADMIN — BENZONATATE 100 MG: 100 CAPSULE ORAL at 02:05

## 2024-05-19 RX ADMIN — BENZONATATE 100 MG: 100 CAPSULE ORAL at 08:05

## 2024-05-19 RX ADMIN — IPRATROPIUM BROMIDE AND ALBUTEROL SULFATE 3 ML: 2.5; .5 SOLUTION RESPIRATORY (INHALATION) at 12:05

## 2024-05-19 RX ADMIN — SODIUM CHLORIDE SOLN NEBU 3% 4 ML: 3 NEBU SOLN at 12:05

## 2024-05-19 NOTE — SUBJECTIVE & OBJECTIVE
Interval History:     Patient up to shower today without o2 in place - provoking severe dyspnea.  Soft sats - 90%- on 3L.  RR stable though more breathless, coughing, and difficulty speaking.  Breath sounds absent in L lung.  Repeating CXR, pulm contacted to see about possible thoracentesis today.  Pleurx tomorrow.     Review of Systems   Constitutional:  Positive for fatigue. Negative for chills and fever.   HENT:  Negative for congestion and sore throat.    Eyes:  Negative for pain.   Respiratory:  Positive for cough and shortness of breath.    Cardiovascular:  Negative for chest pain, palpitations and leg swelling.   Gastrointestinal:  Negative for abdominal pain, constipation, diarrhea, nausea and vomiting.   Genitourinary:  Negative for difficulty urinating, dysuria and hematuria.   Musculoskeletal:  Negative for back pain.   Skin:  Negative for rash.   Neurological:  Negative for light-headedness and headaches.   Hematological:  Does not bruise/bleed easily.   Psychiatric/Behavioral:  Negative for agitation.      Objective:     Vital Signs (Most Recent):  Temp: 96.6 °F (35.9 °C) (05/19/24 1131)  Pulse: 85 (05/19/24 1145)  Resp: 18 (05/19/24 1131)  BP: 138/88 (05/19/24 1131)  SpO2: (!) 92 % (05/19/24 1131) Vital Signs (24h Range):  Temp:  [96.6 °F (35.9 °C)-98.3 °F (36.8 °C)] 96.6 °F (35.9 °C)  Pulse:  [83-98] 85  Resp:  [16-20] 18  SpO2:  [90 %-98 %] 92 %  BP: (111-156)/(69-90) 138/88     Weight: 67.1 kg (148 lb)  Body mass index is 24.63 kg/m².    Intake/Output Summary (Last 24 hours) at 5/19/2024 1409  Last data filed at 5/19/2024 0416  Gross per 24 hour   Intake --   Output 600 ml   Net -600 ml         Physical Exam  Vitals and nursing note reviewed.   Constitutional:       Comments: Older, thin, ill-appearing male, conversant; occasional grimacing    Eyes:      Extraocular Movements: Extraocular movements intact.   Pulmonary:      Comments: frequent cough interrupting ability to speak in full sentences.  Diminished breath sounds L all fields.  Bases more diminished   Abdominal:      General: Abdomen is flat.      Palpations: Abdomen is soft.   Skin:     General: Skin is warm and dry.   Neurological:      General: No focal deficit present.      Mental Status: He is oriented to person, place, and time. Mental status is at baseline.   Psychiatric:         Mood and Affect: Mood normal.         Behavior: Behavior normal.         Thought Content: Thought content normal.         Judgment: Judgment normal.             Significant Labs: All pertinent labs within the past 24 hours have been reviewed.    Significant Imaging: I have reviewed all pertinent imaging results/findings within the past 24 hours.

## 2024-05-19 NOTE — PROGRESS NOTES
[facility] [address] [place] [address] Medicine  Progress Note    Patient Name: Oscar Waldron  MRN: [id number]  Patient Class: IP- Inpatient   Admission Date: 5/8/2024  Length of Stay: 10 days  Attending Physician: Jani Bravo MD  Primary Care Provider: Kenna, Primary Doctor        Subjective:     Principal Problem:Adenocarcinoma, lung        HPI:  Oscar Waldron is a 64 y.o. male with a PMHx of cocaine abuse, HIV not on HAART, tobacco abuse, HTN who present to [facility] for evaluation of shortness of breath. Patient reports progressive worsening of shortness of breath over the past few weeks. He now has a  productive cough with associated left sided chest/ left upper abdomen pain which only occurs during coughing fits. Shortness of breath worsens while lying flat and with minimal exertion. No improvement with albuterol inhaler that he borrowed from a family member. Admits to smoking crack cocaine 2 days ago. Has >40 year hx of tobacco abuse. He stopped taking his HIV medications a few years ago.      ED: tachycardic to , improved without intervention. Satting 96% on 3L NC. BNP <10, trop 0.010. EKG with non-specifically TWAs. CXR with cardiomegaly with pulmonary vascular congestion and bilateral interstitial opacities, moderate left-sided pleural effusion with associated compressive atelectasis. Given solumedrol 125mg, azithro and rocephin.     Overview/Hospital Course:  Oscar Waldron is placed in  Observation for management of acute respiratory failure with hypoxia. Requiring supplemental oxygen on admit. CXR reviewed. Received antibiotics and IV steroids in ED. Started on oral prednisone, continuing IV rocephin and azithromycin. CT chest reviewed, concern for lung cancer and metastatic process.     Interval History:     Patient up to shower today without o2 in place - provoking severe dyspnea.  Soft sats - 90%- on 3L.  RR stable though more breathless, coughing, and difficulty speaking.  Breath sounds absent in L  lung.  Repeating CXR, pulm contacted to see about possible thoracentesis today.  Pleurx tomorrow.     Review of Systems   Constitutional:  Positive for fatigue. Negative for chills and fever.   HENT:  Negative for congestion and sore throat.    Eyes:  Negative for pain.   Respiratory:  Positive for cough and shortness of breath.    Cardiovascular:  Negative for chest pain, palpitations and leg swelling.   Gastrointestinal:  Negative for abdominal pain, constipation, diarrhea, nausea and vomiting.   Genitourinary:  Negative for difficulty urinating, dysuria and hematuria.   Musculoskeletal:  Negative for back pain.   Skin:  Negative for rash.   Neurological:  Negative for light-headedness and headaches.   Hematological:  Does not bruise/bleed easily.   Psychiatric/Behavioral:  Negative for agitation.      Objective:     Vital Signs (Most Recent):  Temp: 96.6 °F (35.9 °C) (05/19/24 1131)  Pulse: 85 (05/19/24 1145)  Resp: 18 (05/19/24 1131)  BP: 138/88 (05/19/24 1131)  SpO2: (!) 92 % (05/19/24 1131) Vital Signs (24h Range):  Temp:  [96.6 °F (35.9 °C)-98.3 °F (36.8 °C)] 96.6 °F (35.9 °C)  Pulse:  [83-98] 85  Resp:  [16-20] 18  SpO2:  [90 %-98 %] 92 %  BP: (111-156)/(69-90) 138/88     Weight: 67.1 kg (148 lb)  Body mass index is 24.63 kg/m².    Intake/Output Summary (Last 24 hours) at 5/19/2024 1409  Last data filed at 5/19/2024 0416  Gross per 24 hour   Intake --   Output 600 ml   Net -600 ml         Physical Exam  Vitals and nursing note reviewed.   Constitutional:       Comments: Older, thin, ill-appearing male, conversant; occasional grimacing    Eyes:      Extraocular Movements: Extraocular movements intact.   Pulmonary:      Comments: frequent cough interrupting ability to speak in full sentences. Diminished breath sounds L all fields.  Bases more diminished   Abdominal:      General: Abdomen is flat.      Palpations: Abdomen is soft.   Skin:     General: Skin is warm and dry.   Neurological:      General: No focal  deficit present.      Mental Status: He is oriented to person, place, and time. Mental status is at baseline.   Psychiatric:         Mood and Affect: Mood normal.         Behavior: Behavior normal.         Thought Content: Thought content normal.         Judgment: Judgment normal.             Significant Labs: All pertinent labs within the past 24 hours have been reviewed.    Significant Imaging: I have reviewed all pertinent imaging results/findings within the past 24 hours.    Assessment/Plan:      Acute respiratory failure with hypoxia  Acute respiratory failure with hypoxia  Abnormal CT of lung  Metastatic lung adenocarcinoma  Left pleural effusion  - CT chest reviewed:  1. Solid spiculated nodule in the left lung apex and irregular consolidation area in the lingula, worrisome for lung cancer.   2. Ground-glass nodule in the right middle lobe, may represent infectious/inflammatory process versus primary lung cancer.  Consider attention on follow-up studies.   3. Bilateral irregular pulmonary nodules, may be sequela of infectious/inflammatory process versus neoplastic nodules.  Consider attention on follow-up.   4. Ill-defined hypoattenuation of the inferior hepatic lobe and few lucent lesions in the vertebral body of L1, nonspecific but worrisome for metastatic process.   5. Left adrenal nodule.      - CT abdomen reviewed:   Few heterogeneously hypodense liver lesions suspicious for metastasis.   Indeterminate indistinct lucent lesions and L1 vertebral body and right intertrochanteric region.  Metastasis not excluded.  Further evaluation could be obtained with contrast enhanced MRI.      - received rocephin/azithro/prednisone; prn duonebs  - acapella and CPT ordered  - pulm & ID consulted  - thora 5/9 with 950 ml removed; pleural fluid studies suggestive of exudative process. Repeat thora today with 1 L removed; repeat CXR.  - sputum cx - normal respiratory av  - mTB PCR negative  - PJP PCP negative  -  aspergillus antigen negative  - fungitell negative  - crypto antigen negative  - AFB smear negative x 3. Follow AFB cultures  - pleural fluid cultures negative  - pleural fluid cytology shows lung adenocarcinoma       - imaging suggestive of metastatic disease  - oncology consulted -  OP f/u on 5/22  - palliative care consulted - prn percocet ordered  - qualifies for home oxygen per 6 min walk test performed on 5/14; home oxygen order placed  - persistently hypoxic 5/16 - 78% on RA; repeat CXR - worsened  - Pulmonary revisit 5/17 - plan for pleurx 5/20        Hypophosphatemia  Hypophosphatemia  - phos 1.5, replaced  - daily phos    Pleural effusion, left  - unclear cause >> new onset CHF vs infection vs malignancy  - BNP <10  - trial lasix 20mg IVP  - CT chest reviewed  - pulm consulted, appreciate assistance    High cholesterol  -High cholesterol  - not on statin  - lipid panel reviewed    HTN (hypertension)  HTN (hypertension)  - not on home meds  - controlled currently    AIDS (acquired immunodeficiency syndrome), CD4 <=200  Human immunodeficiency virus (HIV) disease  AIDS  - non-adherent to ART; off since 2020  - CD4 115  - ID consulted: starting Bactrim for ppx  - ID arranging HOP clinic follow up. I would prefer that the patient present to HOP clinic prior to restarting ART       VTE Risk Mitigation (From admission, onward)           Ordered     Place WILBER hose  Until discontinued         05/08/24 2112     IP VTE LOW RISK PATIENT  Once         05/08/24 2112                    Discharge Planning   MITCH: 5/21/2024     Code Status: Full Code   Is the patient medically ready for discharge?:     Reason for patient still in hospital (select all that apply): Patient unstable  Discharge Plan A: Home with family                  Jani Bravo MD  Department of Hospital Medicine   [facility]

## 2024-05-19 NOTE — PROCEDURES
"Thoracentesis    Date/Time: 5/19/2024 3:43 PM  Location procedure was performed: [facility]BSERV[facility] 11H    Performed by: Bart George MD  Authorized by: Bart George MD  Assisting provider: Agnes Bertrand MD  Pre-operative diagnosis: Left pleural effusion  Post-operative diagnosis: Left pleural effusion  Consent Done: Yes  Consent: Written consent obtained.  Risks and benefits: risks, benefits and alternatives were discussed  Consent given by: patient  Patient understanding: patient states understanding of the procedure being performed  Patient consent: the patient's understanding of the procedure matches consent given  Procedure consent: procedure consent matches procedure scheduled  Relevant documents: relevant documents present and verified  Test results: test results available and properly labeled  Site marked: the operative site was marked  Imaging studies: imaging studies available  Required items: required blood products, implants, devices, and special equipment available  Patient identity confirmed: verbally with patient  Time out: Immediately prior to procedure a "time out" was called to verify the correct patient, procedure, equipment, support staff and site/side marked as required.  Procedure purpose: therapeutic  Indications: pleural effusion  Preparation: Patient was prepped and draped in the usual sterile fashion.  Local anesthesia used: yes    Anesthesia:  Local anesthesia used: yes  Local Anesthetic: lidocaine 1% without epinephrine  Anesthetic total: 5 mL    Patient sedated: no  Preparation: skin prepped with ChloraPrep  Patient position: sitting  Ultrasound guidance: yes  Location: left posterior  Intercostal space: 8th  Puncture method: over-the-needle catheter  Catheter size: 8 Bulgarian  Number of attempts: 4  Drainage amount: 550 ml  Drainage characteristics: serosanguinous  Patient tolerance: Patient tolerated the procedure well with no immediate complications  Chest x-ray " performed: no  Complications: No  Implants: No  Drainage Tube: removed    Bart George MD  [employer] Critical Care Fellow  5/19/2024 @ 3:44 PM

## 2024-05-19 NOTE — PLAN OF CARE
[facility]    Notified that patient with increased dyspnea today. No increase in NC requirement. Repeat CXR again showing L lung white out with no focal opacities on the R. Bedside US showing large L pleural effusion with occasional septation. Patient amenable to small-volume thoracentesis for symptomatic improvement. Is not on therapeutic anticoagulation.    Thoracentesis performed at bedside; tolerated well (see separate procedure note). 550cc pulled; still with notable effusion, which should aid with scheduled PleurX placement tomorrow. Serosanguinous output; will order evening CBC for 5PM. Primary team updated.    Bart George MD  [facility] Critical Care Fellow  5/19/2024 @ 3:39 PM

## 2024-05-19 NOTE — PLAN OF CARE
Problem: Adult Inpatient Plan of Care  Goal: Plan of Care Review  Outcome: Progressing  Goal: Patient-Specific Goal (Individualized)  Outcome: Progressing  Goal: Absence of Hospital-Acquired Illness or Injury  Outcome: Progressing  Goal: Optimal Comfort and Wellbeing  Outcome: Progressing  Goal: Readiness for Transition of Care  Outcome: Progressing     Problem: COPD (Chronic Obstructive Pulmonary Disease)  Goal: Optimal Chronic Illness Coping  Outcome: Progressing  Goal: Optimal Level of Functional [place]  Outcome: Progressing  Goal: Absence of Infection Signs and Symptoms  Outcome: Progressing  Goal: Improved Oral Intake  Outcome: Progressing  Goal: Effective Oxygenation and Ventilation  Outcome: Progressing     Problem: Infection  Goal: Absence of Infection Signs and Symptoms  Outcome: Progressing     Problem: Coping Ineffective  Goal: Effective Coping  Outcome: Progressing     Problem: HIV/AIDS Infection  Goal: HIV/AIDS Symptom Control  Outcome: Progressing     Problem: Gas Exchange Impaired  Goal: Optimal Gas Exchange  Outcome: Progressing     Problem: Comorbidity Management  Goal: Blood Pressure in Desired Range  Outcome: Progressing     Problem: Electrolyte Imbalance  Goal: Electrolyte Balance  Outcome: Progressing     Problem: Oncology Care  Goal: Effective Coping  Outcome: Progressing  Goal: Improved Activity Tolerance  Outcome: Progressing  Goal: Optimal Oral Intake  Outcome: Progressing  Goal: Improved Oral Mucous Membrane Integrity  Outcome: Progressing  Goal: Optimal Pain Control and Function  Outcome: Progressing     Problem: Fall Injury Risk  Goal: Absence of Fall and Fall-Related Injury  Outcome: Progressing     Problem: Skin Injury Risk Increased  Goal: Skin Health and Integrity  Outcome: Progressing

## 2024-05-19 NOTE — PLAN OF CARE
Problem: Adult Inpatient Plan of Care  Goal: Plan of Care Review  Outcome: Progressing  Goal: Patient-Specific Goal (Individualized)  Outcome: Progressing  Goal: Absence of Hospital-Acquired Illness or Injury  Outcome: Progressing  Goal: Optimal Comfort and Wellbeing  Outcome: Progressing  Goal: Readiness for Transition of Care  Outcome: Progressing     Problem: COPD (Chronic Obstructive Pulmonary Disease)  Goal: Optimal Chronic Illness Coping  Outcome: Progressing  Goal: Optimal Level of Functional [place]  Outcome: Progressing  Goal: Absence of Infection Signs and Symptoms  Outcome: Progressing  Goal: Improved Oral Intake  Outcome: Progressing  Goal: Effective Oxygenation and Ventilation  Outcome: Progressing     Problem: Infection  Goal: Absence of Infection Signs and Symptoms  Outcome: Progressing     Problem: Coping Ineffective  Goal: Effective Coping  Outcome: Progressing     Problem: HIV/AIDS Infection  Goal: HIV/AIDS Symptom Control  Outcome: Progressing     Problem: Gas Exchange Impaired  Goal: Optimal Gas Exchange  Outcome: Progressing     Problem: Comorbidity Management  Goal: Blood Pressure in Desired Range  Outcome: Progressing     Problem: Electrolyte Imbalance  Goal: Electrolyte Balance  Outcome: Progressing     Problem: Oncology Care  Goal: Effective Coping  Outcome: Progressing  Goal: Improved Activity Tolerance  Outcome: Progressing  Goal: Optimal Oral Intake  Outcome: Progressing  Goal: Improved Oral Mucous Membrane Integrity  Outcome: Progressing  Goal: Optimal Pain Control and Function  Outcome: Progressing     Problem: Fall Injury Risk  Goal: Absence of Fall and Fall-Related Injury  Outcome: Progressing     Problem: Skin Injury Risk Increased  Goal: Skin Health and Integrity  Outcome: Progressing

## 2024-05-20 PROBLEM — J91.0 MALIGNANT PLEURAL EFFUSION: Status: ACTIVE | Noted: 2024-05-20

## 2024-05-20 LAB
CREAT SERPL-MCNC: 0.8 MG/DL (ref 0.5–1.4)
DNA RANGE(S) EXAMINED NAR: NORMAL
EST. GFR  (NO RACE VARIABLE): >60 ML/MIN/1.73 M^2
GENE DIS ANL INTERP-IMP: NORMAL
GENE DIS ASSESSED: NORMAL
GENE MUT TESTED BLD/T: 6.3 M/MB
LACTATE SERPL-SCNC: 0.9 MMOL/L (ref 0.5–2.2)
MSI CA SPEC-IMP: NORMAL
PD-L1 BY 22C3 TISS IMSTN DOC: POSITIVE
REASON FOR STUDY: NORMAL
TEMPUS FUSIONADDENDUM: NORMAL
TEMPUS PD-L1 (22C3) COMBINED POSITIVE SCORE: 1
TEMPUS PD-L1 (22C3) TUMOR PROPORTION SCORE: 1 %
TEMPUS PERTINENTNEGATIVES: NORMAL
TEMPUS PORTAL: NORMAL
TEMPUS TRIAL1: NORMAL
TEMPUS TRIAL2: NORMAL
TEMPUS TRIAL3: NORMAL
TEMPUS TRIALCOUNT: 3

## 2024-05-20 PROCEDURE — 82565 ASSAY OF CREATININE: CPT | Performed by: INTERNAL MEDICINE

## 2024-05-20 PROCEDURE — 27000221 HC OXYGEN, UP TO 24 HOURS

## 2024-05-20 PROCEDURE — 99900035 HC TECH TIME PER 15 MIN (STAT)

## 2024-05-20 PROCEDURE — 83605 ASSAY OF LACTIC ACID: CPT | Performed by: INTERNAL MEDICINE

## 2024-05-20 PROCEDURE — C1729 CATH, DRAINAGE: HCPCS | Performed by: EMERGENCY MEDICINE

## 2024-05-20 PROCEDURE — 36415 COLL VENOUS BLD VENIPUNCTURE: CPT | Performed by: INTERNAL MEDICINE

## 2024-05-20 PROCEDURE — 63600175 PHARM REV CODE 636 W HCPCS: Performed by: INTERNAL MEDICINE

## 2024-05-20 PROCEDURE — 94640 AIRWAY INHALATION TREATMENT: CPT

## 2024-05-20 PROCEDURE — 21400001 HC TELEMETRY ROOM

## 2024-05-20 PROCEDURE — 25000003 PHARM REV CODE 250: Performed by: HOSPITALIST

## 2024-05-20 PROCEDURE — 63600175 PHARM REV CODE 636 W HCPCS: Performed by: EMERGENCY MEDICINE

## 2024-05-20 PROCEDURE — 99153 MOD SED SAME PHYS/QHP EA: CPT | Performed by: EMERGENCY MEDICINE

## 2024-05-20 PROCEDURE — 25000242 PHARM REV CODE 250 ALT 637 W/ HCPCS: Performed by: HOSPITALIST

## 2024-05-20 PROCEDURE — 27000646 HC AEROBIKA DEVICE

## 2024-05-20 PROCEDURE — 25000003 PHARM REV CODE 250

## 2024-05-20 PROCEDURE — 25000003 PHARM REV CODE 250: Performed by: INTERNAL MEDICINE

## 2024-05-20 PROCEDURE — 99152 MOD SED SAME PHYS/QHP 5/>YRS: CPT | Performed by: EMERGENCY MEDICINE

## 2024-05-20 PROCEDURE — 0W9B30Z DRAINAGE OF LEFT PLEURAL CAVITY WITH DRAINAGE DEVICE, PERCUTANEOUS APPROACH: ICD-10-PCS | Performed by: EMERGENCY MEDICINE

## 2024-05-20 PROCEDURE — 94761 N-INVAS EAR/PLS OXIMETRY MLT: CPT

## 2024-05-20 PROCEDURE — 25000242 PHARM REV CODE 250 ALT 637 W/ HCPCS: Performed by: INTERNAL MEDICINE

## 2024-05-20 PROCEDURE — 32550 INSERT PLEURAL CATH: CPT | Performed by: EMERGENCY MEDICINE

## 2024-05-20 PROCEDURE — 32556 INSERT CATH PLEURA W/O IMAGE: CPT | Performed by: EMERGENCY MEDICINE

## 2024-05-20 PROCEDURE — 94664 DEMO&/EVAL PT USE INHALER: CPT

## 2024-05-20 PROCEDURE — 11000001 HC ACUTE MED/SURG PRIVATE ROOM

## 2024-05-20 RX ORDER — FENTANYL CITRATE 50 UG/ML
INJECTION, SOLUTION INTRAMUSCULAR; INTRAVENOUS CODE/TRAUMA/SEDATION MEDICATION
Status: COMPLETED | OUTPATIENT
Start: 2024-05-20 | End: 2024-05-20

## 2024-05-20 RX ORDER — MIDAZOLAM HYDROCHLORIDE 5 MG/ML
INJECTION INTRAMUSCULAR; INTRAVENOUS CODE/TRAUMA/SEDATION MEDICATION
Status: COMPLETED | OUTPATIENT
Start: 2024-05-20 | End: 2024-05-20

## 2024-05-20 RX ORDER — MORPHINE SULFATE 4 MG/ML
2 INJECTION, SOLUTION INTRAMUSCULAR; INTRAVENOUS EVERY 4 HOURS PRN
Status: DISCONTINUED | OUTPATIENT
Start: 2024-05-20 | End: 2024-05-27

## 2024-05-20 RX ORDER — MORPHINE SULFATE 4 MG/ML
4 INJECTION, SOLUTION INTRAMUSCULAR; INTRAVENOUS ONCE
Status: COMPLETED | OUTPATIENT
Start: 2024-05-20 | End: 2024-05-20

## 2024-05-20 RX ADMIN — SODIUM CHLORIDE SOLN NEBU 3% 4 ML: 3 NEBU SOLN at 12:05

## 2024-05-20 RX ADMIN — OXYCODONE HYDROCHLORIDE AND ACETAMINOPHEN 1 TABLET: 5; 325 TABLET ORAL at 08:05

## 2024-05-20 RX ADMIN — MORPHINE SULFATE 4 MG: 4 INJECTION INTRAVENOUS at 12:05

## 2024-05-20 RX ADMIN — IPRATROPIUM BROMIDE AND ALBUTEROL SULFATE 3 ML: 2.5; .5 SOLUTION RESPIRATORY (INHALATION) at 04:05

## 2024-05-20 RX ADMIN — OXYCODONE HYDROCHLORIDE AND ACETAMINOPHEN 1 TABLET: 5; 325 TABLET ORAL at 11:05

## 2024-05-20 RX ADMIN — BENZONATATE 100 MG: 100 CAPSULE ORAL at 08:05

## 2024-05-20 RX ADMIN — MIDAZOLAM 1 MG: 5 INJECTION INTRAMUSCULAR; INTRAVENOUS at 09:05

## 2024-05-20 RX ADMIN — SODIUM CHLORIDE SOLN NEBU 3% 4 ML: 3 NEBU SOLN at 11:05

## 2024-05-20 RX ADMIN — GUAIFENESIN 1200 MG: 600 TABLET, EXTENDED RELEASE ORAL at 08:05

## 2024-05-20 RX ADMIN — OXYCODONE HYDROCHLORIDE AND ACETAMINOPHEN 1 TABLET: 5; 325 TABLET ORAL at 05:05

## 2024-05-20 RX ADMIN — IPRATROPIUM BROMIDE AND ALBUTEROL SULFATE 3 ML: 2.5; .5 SOLUTION RESPIRATORY (INHALATION) at 12:05

## 2024-05-20 RX ADMIN — MORPHINE SULFATE 4 MG: 4 INJECTION INTRAVENOUS at 04:05

## 2024-05-20 RX ADMIN — IPRATROPIUM BROMIDE AND ALBUTEROL SULFATE 3 ML: 2.5; .5 SOLUTION RESPIRATORY (INHALATION) at 11:05

## 2024-05-20 RX ADMIN — IPRATROPIUM BROMIDE AND ALBUTEROL SULFATE 3 ML: 2.5; .5 SOLUTION RESPIRATORY (INHALATION) at 07:05

## 2024-05-20 RX ADMIN — IPRATROPIUM BROMIDE AND ALBUTEROL SULFATE 3 ML: 2.5; .5 SOLUTION RESPIRATORY (INHALATION) at 08:05

## 2024-05-20 RX ADMIN — SODIUM CHLORIDE SOLN NEBU 3% 4 ML: 3 NEBU SOLN at 07:05

## 2024-05-20 RX ADMIN — SODIUM CHLORIDE SOLN NEBU 3% 4 ML: 3 NEBU SOLN at 04:05

## 2024-05-20 RX ADMIN — FENTANYL CITRATE 25 MCG: 0.05 INJECTION, SOLUTION INTRAMUSCULAR; INTRAVENOUS at 09:05

## 2024-05-20 RX ADMIN — SULFAMETHOXAZOLE AND TRIMETHOPRIM 1 TABLET: 800; 160 TABLET ORAL at 04:05

## 2024-05-20 RX ADMIN — BENZONATATE 100 MG: 100 CAPSULE ORAL at 04:05

## 2024-05-20 NOTE — PLAN OF CARE
Problem: Adult Inpatient Plan of Care  Goal: Plan of Care Review  Outcome: Progressing  Goal: Patient-Specific Goal (Individualized)  Outcome: Progressing  Goal: Absence of Hospital-Acquired Illness or Injury  Outcome: Progressing  Goal: Optimal Comfort and Wellbeing  Outcome: Progressing  Goal: Readiness for Transition of Care  Outcome: Progressing     Problem: COPD (Chronic Obstructive Pulmonary Disease)  Goal: Optimal Chronic Illness Coping  Outcome: Progressing  Goal: Optimal Level of Functional [place]  Outcome: Progressing  Goal: Absence of Infection Signs and Symptoms  Outcome: Progressing  Goal: Improved Oral Intake  Outcome: Progressing  Goal: Effective Oxygenation and Ventilation  Outcome: Progressing     Problem: Infection  Goal: Absence of Infection Signs and Symptoms  Outcome: Progressing     Problem: Coping Ineffective  Goal: Effective Coping  Outcome: Progressing     Problem: HIV/AIDS Infection  Goal: HIV/AIDS Symptom Control  Outcome: Progressing     Problem: Gas Exchange Impaired  Goal: Optimal Gas Exchange  Outcome: Progressing     Problem: Comorbidity Management  Goal: Blood Pressure in Desired Range  Outcome: Progressing     Problem: Electrolyte Imbalance  Goal: Electrolyte Balance  Outcome: Progressing     Problem: Oncology Care  Goal: Effective Coping  Outcome: Progressing  Goal: Improved Activity Tolerance  Outcome: Progressing  Goal: Optimal Oral Intake  Outcome: Progressing  Goal: Improved Oral Mucous Membrane Integrity  Outcome: Progressing  Goal: Optimal Pain Control and Function  Outcome: Progressing     Problem: Fall Injury Risk  Goal: Absence of Fall and Fall-Related Injury  Outcome: Progressing     Problem: Skin Injury Risk Increased  Goal: Skin Health and Integrity  Outcome: Progressing

## 2024-05-20 NOTE — PROGRESS NOTES
[facility]  Pulmonology  Progress Note    Patient Name: Oscar Waldron  MRN: [id number]  Admission Date: 5/8/2024  Hospital Length of Stay: 11 days  Code Status: Full Code  Attending Provider: Jani Bravo MD  Primary Care Provider: No, Primary Doctor   Principal Problem: Adenocarcinoma, lung    Subjective:     Interval History: Doing well this morning. Kept NPO overnight for procedure today. Reviewed VS. No acute events overnight.       Objective:     Vital Signs (Most Recent):  Temp: 98.5 °F (36.9 °C) (05/20/24 0739)  Pulse: 93 (05/20/24 1002)  Resp: 19 (05/20/24 1002)  BP: (!) 133/92 (05/20/24 1002)  SpO2: 97 % (05/20/24 1002) Vital Signs (24h Range):  Temp:  [96.6 °F (35.9 °C)-98.5 °F (36.9 °C)] 98.5 °F (36.9 °C)  Pulse:  [] 93  Resp:  [11-23] 19  SpO2:  [18 %-98 %] 97 %  BP: (119-157)/(75-94) 133/92     Weight: 66.1 kg (145 lb 11.6 oz)  Body mass index is 24.25 kg/m².      Intake/Output Summary (Last 24 hours) at 5/20/2024 1029  Last data filed at 5/20/2024 0504  Gross per 24 hour   Intake 390 ml   Output 825 ml   Net -435 ml        Physical Exam  Vitals and nursing note reviewed.   Constitutional:       Appearance: Normal appearance.   HENT:      Head: Atraumatic.      Right Ear: External ear normal.      Left Ear: External ear normal.   Eyes:      Extraocular Movements: Extraocular movements intact.   Cardiovascular:      Rate and Rhythm: Normal rate and regular rhythm.      Heart sounds: Normal heart sounds.   Pulmonary:      Breath sounds: Examination of the left-lower field reveals decreased breath sounds. Decreased breath sounds present.      Comments: Dullness to percussion LLL  Abdominal:      General: Abdomen is flat.      Palpations: Abdomen is soft.   Musculoskeletal:         General: Normal range of motion.   Skin:     General: Skin is warm and dry.      Capillary Refill: Capillary refill takes less than 2 seconds.   Neurological:      General: No focal deficit present.       "Mental Status: He is alert and oriented to person, place, and time.   Psychiatric:         Mood and Affect: Mood normal.         Behavior: Behavior normal.           Review of Systems   Respiratory:  Positive for cough. Negative for shortness of breath.    Cardiovascular:  Negative for chest pain and palpitations.   Gastrointestinal:  Positive for nausea.       Vents:  Oxygen Concentration (%): 28 (05/20/24 0726)    Lines/Drains/Airways       Peripheral Intravenous Line  Duration                  Peripheral IV - Single Lumen 05/18/24 0709 22 G Anterior;Right Forearm 2 days                    Significant Labs:    CBC/Anemia Profile:  Recent Labs   Lab 05/19/24  1753   WBC 12.67   HGB 15.7   HCT 49.2      MCV 89   RDW 13.8        Chemistries:  No results for input(s): "NA", "K", "CL", "CO2", "BUN", "CREATININE", "CALCIUM", "ALBUMIN", "PROT", "BILITOT", "ALKPHOS", "ALT", "AST", "GLUCOSE", "MG", "PHOS" in the last 48 hours.    All pertinent labs within the past 24 hours have been reviewed.    Significant Imaging:  I have reviewed all pertinent imaging results/findings within the past 24 hours.  Assessment/Plan:     Pulmonary  Pleural effusion, left  64M with HIV and pulmonary nodules admitted for acute hypoxic respiratory failure in the setting of pneumonia and left pleural effusion. CT chest concerning for RML nodule, SUZANNA nodule, LLL consolidation, left pleural effusion, emphysematous changes. Nodules appear spiculated and concerning for possible malignancy. Pulmonology consulted for evaluation and management of pleural effusion.    S/p thoracentesis x3 this admission. Sent off thoracentesis labs including cytology and GMS stain.  Exudative effusion in the setting of known malignancy, rapidly re-accumulating.     RECOMMENDATIONS / PLAN:  - patient agreeable for Pleur-x at this time  - Will plan for Pleur-x today.   - Provide bedside teaching tomorrow on how to use it tomorrow.   - Engage CM in obtaining draining " bottles for when he's discharged.          Austin Jacobs MD  Pulmonology  [facility] - Observation 11H

## 2024-05-20 NOTE — PLAN OF CARE
SW spoke to pt at the bedside re: dx and dispo plan. Pt is requesting to speak about preferred NH with Hospice care on tomorrow due to processing the need of hospice. SW will continue to f/u and re-visit the pt on tomorrow.     Holly Moser, BECKY  [facility]  Ext. [phone]

## 2024-05-20 NOTE — SUBJECTIVE & OBJECTIVE
Interval History:     S/P repeat thoracentesis on 5/19 with pleurx catheter today.  Modifying pain control.  Long discussion had re: goals of care with patient and sister.  Shared poor prognosis of Stage IV cancer complicated by recurrent effusions.  In light of this and social support issues which will complicate any non curative treatment patient has elected to comfort care.  DNR order placed and now agreeable to hospice.  SW aware.  Modifying orders in line with comfort focused treatment.      Review of Systems   Constitutional:  Positive for fatigue. Negative for chills and fever.   HENT:  Negative for congestion and sore throat.    Eyes:  Negative for pain.   Respiratory:  Positive for cough and shortness of breath.    Cardiovascular:  Negative for chest pain, palpitations and leg swelling.   Gastrointestinal:  Negative for abdominal pain, constipation, diarrhea, nausea and vomiting.   Genitourinary:  Negative for difficulty urinating, dysuria and hematuria.   Musculoskeletal:  Negative for back pain.   Skin:  Negative for rash.   Neurological:  Negative for light-headedness and headaches.   Hematological:  Does not bruise/bleed easily.   Psychiatric/Behavioral:  Negative for agitation.      Objective:     Vital Signs (Most Recent):  Temp: 97.6 °F (36.4 °C) (05/20/24 1539)  Pulse: 68 (05/20/24 1611)  Resp: 18 (05/20/24 1611)  BP: (!) 144/87 (05/20/24 1539)  SpO2: (!) 92 % (05/20/24 1611) Vital Signs (24h Range):  Temp:  [97.6 °F (36.4 °C)-98.5 °F (36.9 °C)] 97.6 °F (36.4 °C)  Pulse:  [] 68  Resp:  [11-23] 18  SpO2:  [18 %-97 %] 92 %  BP: (119-157)/(75-94) 144/87     Weight: 66.1 kg (145 lb 11.6 oz)  Body mass index is 24.25 kg/m².    Intake/Output Summary (Last 24 hours) at 5/20/2024 174  Last data filed at 5/20/2024 0504  Gross per 24 hour   Intake 390 ml   Output 825 ml   Net -435 ml         Physical Exam  Vitals and nursing note reviewed.   Constitutional:       Comments: Older, thin, ill-appearing  male, conversant; occasional grimacing    Eyes:      Extraocular Movements: Extraocular movements intact.   Pulmonary:      Comments: frequent cough interrupting ability to speak in full sentences. Diminished breath sounds L all fields.  Bases more diminished   Abdominal:      General: Abdomen is flat.      Palpations: Abdomen is soft.   Skin:     General: Skin is warm and dry.   Neurological:      General: No focal deficit present.      Mental Status: He is oriented to person, place, and time. Mental status is at baseline.   Psychiatric:         Mood and Affect: Mood normal.         Behavior: Behavior normal.         Thought Content: Thought content normal.         Judgment: Judgment normal.             Significant Labs: All pertinent labs within the past 24 hours have been reviewed.    Significant Imaging: I have reviewed all pertinent imaging results/findings within the past 24 hours.

## 2024-05-20 NOTE — SUBJECTIVE & OBJECTIVE
Interval History: Doing well this morning. Kept NPO overnight for procedure today. Reviewed VS. No acute events overnight.       Objective:     Vital Signs (Most Recent):  Temp: 98.5 °F (36.9 °C) (05/20/24 0739)  Pulse: 93 (05/20/24 1002)  Resp: 19 (05/20/24 1002)  BP: (!) 133/92 (05/20/24 1002)  SpO2: 97 % (05/20/24 1002) Vital Signs (24h Range):  Temp:  [96.6 °F (35.9 °C)-98.5 °F (36.9 °C)] 98.5 °F (36.9 °C)  Pulse:  [] 93  Resp:  [11-23] 19  SpO2:  [18 %-98 %] 97 %  BP: (119-157)/(75-94) 133/92     Weight: 66.1 kg (145 lb 11.6 oz)  Body mass index is 24.25 kg/m².      Intake/Output Summary (Last 24 hours) at 5/20/2024 1029  Last data filed at 5/20/2024 0504  Gross per 24 hour   Intake 390 ml   Output 825 ml   Net -435 ml        Physical Exam  Vitals and nursing note reviewed.   Constitutional:       Appearance: Normal appearance.   HENT:      Head: Atraumatic.      Right Ear: External ear normal.      Left Ear: External ear normal.   Eyes:      Extraocular Movements: Extraocular movements intact.   Cardiovascular:      Rate and Rhythm: Normal rate and regular rhythm.      Heart sounds: Normal heart sounds.   Pulmonary:      Breath sounds: Examination of the left-lower field reveals decreased breath sounds. Decreased breath sounds present.      Comments: Dullness to percussion LLL  Abdominal:      General: Abdomen is flat.      Palpations: Abdomen is soft.   Musculoskeletal:         General: Normal range of motion.   Skin:     General: Skin is warm and dry.      Capillary Refill: Capillary refill takes less than 2 seconds.   Neurological:      General: No focal deficit present.      Mental Status: He is alert and oriented to person, place, and time.   Psychiatric:         Mood and Affect: Mood normal.         Behavior: Behavior normal.           Review of Systems   Respiratory:  Positive for cough. Negative for shortness of breath.    Cardiovascular:  Negative for chest pain and palpitations.  "  Gastrointestinal:  Positive for nausea.       Vents:  Oxygen Concentration (%): 28 (05/20/24 0726)    Lines/Drains/Airways       Peripheral Intravenous Line  Duration                  Peripheral IV - Single Lumen 05/18/24 0709 22 G Anterior;Right Forearm 2 days                    Significant Labs:    CBC/Anemia Profile:  Recent Labs   Lab 05/19/24  1753   WBC 12.67   HGB 15.7   HCT 49.2      MCV 89   RDW 13.8        Chemistries:  No results for input(s): "NA", "K", "CL", "CO2", "BUN", "CREATININE", "CALCIUM", "ALBUMIN", "PROT", "BILITOT", "ALKPHOS", "ALT", "AST", "GLUCOSE", "MG", "PHOS" in the last 48 hours.    All pertinent labs within the past 24 hours have been reviewed.    Significant Imaging:  I have reviewed all pertinent imaging results/findings within the past 24 hours.  "

## 2024-05-20 NOTE — ED NOTES
Pleurx Catheter placed  L midaxillary. 600 ml serosanguineous pleural fluid drained, dressed with 4X4 and tegaderm.  No specimens obtained during Pleurx.  Verbal report given to Ethel Wallace RN to include documentation charted in procedural sedation documentation, CXR needed at bedside.  Moderate concious sedation was performed and cardiorespiratory functions were monitored the entire procedure by Roma Maldonado RN.  Sedation began at 0930  and concluded at 1010.  The patient tolerated the procedure well.  Roma Maldonado RN

## 2024-05-20 NOTE — PROGRESS NOTES
"  [facility]  Adult Nutrition  Progress Note    SUMMARY       Recommendations    1.) If and when medically able, recommend to ADAT, with goal of regular diet.     2.) If PO intake <50%, recommend Boost Plus (or alternate) TID to help meet needs.     3.) RD to monitor wt, PO intake, skin, labs.     Goals: to meet % of EEN/EPN by next RD f/u  Nutrition Goal Status: new  Communication of RD Recs:  (POC)    Assessment and Plan    Nutrition Problem  Increased PRO needs    Related to (etiology):   Increased physiological needs    Signs and Symptoms (as evidenced by):   Lung Ca     Interventions/Recommendations (treatment strategy):  Collaboration of nutritional care with other providers.     Nutrition Diagnosis Status:   New     Reason for Assessment    Reason For Assessment: length of stay  Diagnosis: cancer diagnosis/related complications (Adenocarcinoma, lung)  Relevant Medical History: AIDS, HTN, HLD  Interdisciplinary Rounds: did not attend    General Information Comments: Pt seen for LOS. Pt denies n/v/d/c. Pt denies chewing or swallowing issues. Pt endorses good appetite, consuming % of their meals during their stay. Pt is currently NPO for s/p thoracentesis and PleurX. Pt stated that they were hungry and was looking forward to their next meal. Pt states that UBW ~150#. #. Pt appears to be nourished, NFPE not warranted. RD to continue to monitor and f/u.     Nutrition Discharge Planning: adequate PO intake and ONS of choice    Nutrition Risk Screen    Nutrition Risk Screen: no indicators present    Nutrition/Diet History    Patient Reported Diet/Restrictions/Preferences: general  Typical Food/Fluid Intake: 2-4 meals/day  Spiritual, Cultural Beliefs, Restorationist Practices, Values that Affect Care: no  Food Allergies: shellfish    Anthropometrics    Temp: 97.6 °F (36.4 °C)  Height: 5' 5" (165.1 cm)  Height (inches): 65 in  Weight Method: Standard Scale  Weight: 66.1 kg (145 lb 11.6 " oz)  Weight (lb): 145.73 lb  Ideal Body Weight (IBW), Male: 136 lb  % Ideal Body Weight, Male (lb): 111.85 %  BMI (Calculated): 24.2    Lab/Procedures/Meds    Pertinent Labs Reviewed: reviewed  Pertinent Labs Comments: No new metabolic labs    Pertinent Medications Reviewed: reviewed  Pertinent Medications Comments: Abx, NaCl    Estimated/Assessed Needs    Weight Used For Calorie Calculations: 66.1 kg (145 lb 11.6 oz)  Energy Calorie Requirements (kcal): 1983 kcal  Energy Need Method: Kcal/kg (30 kcal/kg)    Protein Requirements: 79- 99g (1.2-1.5g/kg)  Weight Used For Protein Calculations: 66.1 kg (145 lb 11.6 oz)    Fluid Requirements (mL): 1ml/1kcal or per MD  Estimated Fluid Requirement Method: RDA Method  RDA Method (mL): 1983    Nutrition Prescription Ordered    Current Diet Order: NPO (for procedure)    Evaluation of Received Nutrient/Fluid Intake    I/O: -5.3L since 5/10  Energy Calories Required: not meeting needs  Protein Required: not meeting needs  Fluid Required:  (as per MD)  Comments: LBM 5/17  % Intake of Estimated Energy Needs: 0 - 25 %  % Meal Intake: NPO    Nutrition Risk    Level of Risk/Frequency of Follow-up:  (RD to f/u x 1/week)     Monitor and Evaluation    Food and Nutrient Intake: energy intake, food and beverage intake  Food and Nutrient Adminstration: diet order  Anthropometric Measurements: weight, weight change, body mass index  Biochemical Data, Medical Tests and Procedures: electrolyte and renal panel, gastrointestinal profile, glucose/endocrine profile, inflammatory profile, lipid profile  Nutrition-Focused Physical Findings: overall appearance, skin     Nutrition Follow-Up    RD Follow-up?: Yes

## 2024-05-20 NOTE — ED NOTES
H and P updated-inpatient, patient placed on cardiac monitor, anesthesia Plan:  Conscious sedation, ASA verified-yes, Airway exam performed-yes, Personal or Family history of anesthesia complications-No  Consent signed and witnessed, Roma Maldonado RN

## 2024-05-20 NOTE — ASSESSMENT & PLAN NOTE
Acute respiratory failure with hypoxia  Abnormal CT of lung  Metastatic lung adenocarcinoma  Left pleural effusion  - CT chest reviewed:  1. Solid spiculated nodule in the left lung apex and irregular consolidation area in the lingula, worrisome for lung cancer.   2. Ground-glass nodule in the right middle lobe, may represent infectious/inflammatory process versus primary lung cancer.  Consider attention on follow-up studies.   3. Bilateral irregular pulmonary nodules, may be sequela of infectious/inflammatory process versus neoplastic nodules.  Consider attention on follow-up.   4. Ill-defined hypoattenuation of the inferior hepatic lobe and few lucent lesions in the vertebral body of L1, nonspecific but worrisome for metastatic process.   5. Left adrenal nodule.      - CT abdomen reviewed:   Few heterogeneously hypodense liver lesions suspicious for metastasis.   Indeterminate indistinct lucent lesions and L1 vertebral body and right intertrochanteric region.  Metastasis not excluded.  Further evaluation could be obtained with contrast enhanced MRI.      - received rocephin/azithro/prednisone; prn duonebs  - acapella and CPT ordered  - pulm & ID consulted  - thora 5/9 with 950 ml removed; pleural fluid studies suggestive of exudative process.  - sputum cx - normal respiratory av  - mTB PCR negative  - PJP PCP negative  - aspergillus antigen negative  - fungitell negative  - crypto antigen negative  - AFB smear negative x 3. Follow AFB cultures  - pleural fluid cultures negative  - pleural fluid cytology shows lung adenocarcinoma       - imaging suggestive of metastatic disease  - oncology consulted -  OP f/u on 5/22  - palliative care consulted - prn percocet ordered  - qualifies for home oxygen per 6 min walk test performed on 5/14; home oxygen order placed  - persistently hypoxic 5/16 - 78% on RA; repeat CXR - worsened  - Pulmonary revisit 5/17, 5/19 for thoracentesis  - Pleurx 5/20   - DNR, election to  comfort care plan with hospice as of 5/20.  SW assisting.

## 2024-05-20 NOTE — ASSESSMENT & PLAN NOTE
64M with HIV and pulmonary nodules admitted for acute hypoxic respiratory failure in the setting of pneumonia and left pleural effusion. CT chest concerning for RML nodule, SUZANNA nodule, LLL consolidation, left pleural effusion, emphysematous changes. Nodules appear spiculated and concerning for possible malignancy. Pulmonology consulted for evaluation and management of pleural effusion.    S/p thoracentesis x3 this admission. Sent off thoracentesis labs including cytology and GMS stain.  Exudative effusion in the setting of known malignancy, rapidly re-accumulating.     RECOMMENDATIONS / PLAN:  - patient agreeable for Pleur-x at this time  - Will plan for Pleur-x today.   - Provide bedside teaching tomorrow on how to use it tomorrow.   - Engage CM in obtaining draining bottles for when he's discharged.

## 2024-05-20 NOTE — PROCEDURES
"Oscar Waldron is a 64 y.o. male patient.    Temp: 98.5 °F (36.9 °C) (24 0739)  Pulse: 93 (24)  Resp: 19 (24)  BP: (!) 133/92 (24)  SpO2: 97 % (24)  Weight: 66.1 kg (145 lb 11.6 oz) (24 06)  Height: 5' 5" (165.1 cm) (24 0854)     PleurX catheter Insertion  Chest Tube Insertion    Date/Time: 2024 10:12 AM  Location procedure was performed: [facility]    Performed by: Austin Arteaga MD  Authorized by: Austin Arteaga MD  Assisting provider: Greg Macario MD  Post-operative diagnosis: Same  Pre-operative diagnosis: Malignant Pleural effusion  Consent Done: Yes  Consent: Verbal consent obtained. Written consent obtained.  Risks and benefits: risks, benefits and alternatives were discussed  Consent given by: patient  Patient understanding: patient states understanding of the procedure being performed  Patient consent: the patient's understanding of the procedure matches consent given  Procedure consent: procedure consent matches procedure scheduled  Relevant documents: relevant documents present and verified  Test results: test results available and properly labeled  Site marked: the operative site was marked  Imaging studies: imaging studies available  Required items: required blood products, implants, devices, and special equipment available  Patient identity confirmed: , MRN, name and verbally with patient  Time out: Immediately prior to procedure a "time out" was called to verify the correct patient, procedure, equipment, support staff and site/side marked as required.  Indications: pleural effusion  Indications comments: Malignant, recurrent pleural effusion    Patient sedated: yes  Sedation type: moderate (conscious) sedation    Sedatives: see MAR for details  Anesthesia: local infiltration    Anesthesia:  Local Anesthetic: lidocaine 1% without epinephrine  Anesthetic total: 20 mL  Preparation: skin prepped " with ChloraPrep  Placement location: left lateral  Tube size (Grenadian): PleurX catheter.  Tension pneumothorax heard: no  Drainage characteristics: serosanguinous  Drainage amount: 600 ml  Dressing: 4x4 sterile gauze (Tegaderm)  Patient tolerance: Patient tolerated the procedure well with no immediate complications  Technical procedures used: Seldinger technique  Significant surgical tasks conducted by the assistant(s): supervised  Complications: No  Estimated blood loss (mL): 5  Specimens: No  Implants: No  Comments: Entered the pleural space and placed guide wire, used scalpel to make skin incision in the entry site and exit site, 5 cm anteriorly. Created tunneled tract using provided tunneling instrument. Passed the pleurX catheter into the tract. Inserted dilator through the guidewire and then, using the provided insertion tool, inserted catheter into the pleural space. Sutured the skin in the entry site and secured the catheter in place at the exit site. Dressed with provided dressing covered by 4x4 gauze and tegaderm. 600 ml of fluid drained.        Austin Jacobs MD  Pulmonary Critical Care Medicine Fellow  5/20/2024

## 2024-05-20 NOTE — PLAN OF CARE
Problem: Adult Inpatient Plan of Care  Goal: Plan of Care Review  Outcome: Progressing  Goal: Patient-Specific Goal (Individualized)  Outcome: Progressing  Goal: Absence of Hospital-Acquired Illness or Injury  Outcome: Progressing  Goal: Optimal Comfort and Wellbeing  Outcome: Progressing  Goal: Readiness for Transition of Care  Outcome: Progressing     Problem: COPD (Chronic Obstructive Pulmonary Disease)  Goal: Optimal Chronic Illness Coping  Outcome: Progressing  Goal: Optimal Level of Functional [place]  Outcome: Progressing  Goal: Absence of Infection Signs and Symptoms  Outcome: Progressing  Goal: Improved Oral Intake  Outcome: Progressing  Goal: Effective Oxygenation and Ventilation  Outcome: Progressing     Problem: Infection  Goal: Absence of Infection Signs and Symptoms  Outcome: Progressing     Problem: Coping Ineffective  Goal: Effective Coping  Outcome: Progressing     Problem: HIV/AIDS Infection  Goal: HIV/AIDS Symptom Control  Outcome: Progressing     Problem: Gas Exchange Impaired  Goal: Optimal Gas Exchange  Outcome: Progressing     Problem: Comorbidity Management  Goal: Blood Pressure in Desired Range  Outcome: Progressing     Problem: Electrolyte Imbalance  Goal: Electrolyte Balance  Outcome: Progressing     Problem: Oncology Care  Goal: Effective Coping  Outcome: Progressing  Goal: Improved Activity Tolerance  Outcome: Progressing  Goal: Optimal Oral Intake  Outcome: Progressing  Goal: Improved Oral Mucous Membrane Integrity  Outcome: Progressing  Goal: Optimal Pain Control and Function  Outcome: Progressing     Problem: Fall Injury Risk  Goal: Absence of Fall and Fall-Related Injury  Outcome: Progressing     Problem: Skin Injury Risk Increased  Goal: Skin Health and Integrity  Outcome: Progressing

## 2024-05-20 NOTE — PLAN OF CARE
Recommendations     1.) If and when medically able, recommend to ADAT, with goal of regular diet.      2.) If PO intake <50%, recommend Boost Plus (or alternate) TID to help meet needs.      3.) RD to monitor wt, PO intake, skin, labs.      Goals: to meet % of EEN/EPN by next RD f/u  Nutrition Goal Status: new  Communication of RD Recs:  (POC)

## 2024-05-20 NOTE — PROGRESS NOTES
[facility] [address] [place] [address] Medicine  Progress Note    Patient Name: Oscar Waldron  MRN: [id number]  Patient Class: IP- Inpatient   Admission Date: 5/8/2024  Length of Stay: 11 days  Attending Physician: Jani Bravo MD  Primary Care Provider: Kenna, Primary Doctor        Subjective:     Principal Problem:Adenocarcinoma, lung        HPI:  Oscar Waldron is a 64 y.o. male with a PMHx of cocaine abuse, HIV not on HAART, tobacco abuse, HTN who present to [facility] for evaluation of shortness of breath. Patient reports progressive worsening of shortness of breath over the past few weeks. He now has a  productive cough with associated left sided chest/ left upper abdomen pain which only occurs during coughing fits. Shortness of breath worsens while lying flat and with minimal exertion. No improvement with albuterol inhaler that he borrowed from a family member. Admits to smoking crack cocaine 2 days ago. Has >40 year hx of tobacco abuse. He stopped taking his HIV medications a few years ago.      ED: tachycardic to , improved without intervention. Satting 96% on 3L NC. BNP <10, trop 0.010. EKG with non-specifically TWAs. CXR with cardiomegaly with pulmonary vascular congestion and bilateral interstitial opacities, moderate left-sided pleural effusion with associated compressive atelectasis. Given solumedrol 125mg, azithro and rocephin.     Overview/Hospital Course:  Oscar Waldron is placed in  Observation for management of acute respiratory failure with hypoxia. Requiring supplemental oxygen on admit. CXR reviewed. Received antibiotics and IV steroids in ED. Started on oral prednisone, continuing IV rocephin and azithromycin. CT chest reviewed, concern for lung cancer and metastatic process.     Interval History:     S/P repeat thoracentesis on 5/19 with pleurx catheter today.  Modifying pain control.  Long discussion had re: goals of care with patient and sister.  Shared poor prognosis of Stage IV cancer  complicated by recurrent effusions.  In light of this and social support issues which will complicate any non curative treatment patient has elected to comfort care.  DNR order placed and now agreeable to hospice.  SW aware.  Modifying orders in line with comfort focused treatment.      Review of Systems   Constitutional:  Positive for fatigue. Negative for chills and fever.   HENT:  Negative for congestion and sore throat.    Eyes:  Negative for pain.   Respiratory:  Positive for cough and shortness of breath.    Cardiovascular:  Negative for chest pain, palpitations and leg swelling.   Gastrointestinal:  Negative for abdominal pain, constipation, diarrhea, nausea and vomiting.   Genitourinary:  Negative for difficulty urinating, dysuria and hematuria.   Musculoskeletal:  Negative for back pain.   Skin:  Negative for rash.   Neurological:  Negative for light-headedness and headaches.   Hematological:  Does not bruise/bleed easily.   Psychiatric/Behavioral:  Negative for agitation.      Objective:     Vital Signs (Most Recent):  Temp: 97.6 °F (36.4 °C) (05/20/24 1539)  Pulse: 68 (05/20/24 1611)  Resp: 18 (05/20/24 1611)  BP: (!) 144/87 (05/20/24 1539)  SpO2: (!) 92 % (05/20/24 1611) Vital Signs (24h Range):  Temp:  [97.6 °F (36.4 °C)-98.5 °F (36.9 °C)] 97.6 °F (36.4 °C)  Pulse:  [] 68  Resp:  [11-23] 18  SpO2:  [18 %-97 %] 92 %  BP: (119-157)/(75-94) 144/87     Weight: 66.1 kg (145 lb 11.6 oz)  Body mass index is 24.25 kg/m².    Intake/Output Summary (Last 24 hours) at 5/20/2024 1743  Last data filed at 5/20/2024 0504  Gross per 24 hour   Intake 390 ml   Output 825 ml   Net -435 ml         Physical Exam  Vitals and nursing note reviewed.   Constitutional:       Comments: Older, thin, ill-appearing male, conversant; occasional grimacing    Eyes:      Extraocular Movements: Extraocular movements intact.   Pulmonary:      Comments: frequent cough interrupting ability to speak in full sentences. Diminished breath  sounds L all fields.  Bases more diminished   Abdominal:      General: Abdomen is flat.      Palpations: Abdomen is soft.   Skin:     General: Skin is warm and dry.   Neurological:      General: No focal deficit present.      Mental Status: He is oriented to person, place, and time. Mental status is at baseline.   Psychiatric:         Mood and Affect: Mood normal.         Behavior: Behavior normal.         Thought Content: Thought content normal.         Judgment: Judgment normal.             Significant Labs: All pertinent labs within the past 24 hours have been reviewed.    Significant Imaging: I have reviewed all pertinent imaging results/findings within the past 24 hours.    Assessment/Plan:      Acute respiratory failure with hypoxia  Acute respiratory failure with hypoxia  Abnormal CT of lung  Metastatic lung adenocarcinoma  Left pleural effusion  - CT chest reviewed:  1. Solid spiculated nodule in the left lung apex and irregular consolidation area in the lingula, worrisome for lung cancer.   2. Ground-glass nodule in the right middle lobe, may represent infectious/inflammatory process versus primary lung cancer.  Consider attention on follow-up studies.   3. Bilateral irregular pulmonary nodules, may be sequela of infectious/inflammatory process versus neoplastic nodules.  Consider attention on follow-up.   4. Ill-defined hypoattenuation of the inferior hepatic lobe and few lucent lesions in the vertebral body of L1, nonspecific but worrisome for metastatic process.   5. Left adrenal nodule.      - CT abdomen reviewed:   Few heterogeneously hypodense liver lesions suspicious for metastasis.   Indeterminate indistinct lucent lesions and L1 vertebral body and right intertrochanteric region.  Metastasis not excluded.  Further evaluation could be obtained with contrast enhanced MRI.      - received rocephin/azithro/prednisone; prn duonebs  - acapella and CPT ordered  - pulm & ID consulted  - dank 5/9 with 950  ml removed; pleural fluid studies suggestive of exudative process.  - sputum cx - normal respiratory av  - mTB PCR negative  - PJP PCP negative  - aspergillus antigen negative  - fungitell negative  - crypto antigen negative  - AFB smear negative x 3. Follow AFB cultures  - pleural fluid cultures negative  - pleural fluid cytology shows lung adenocarcinoma       - imaging suggestive of metastatic disease  - oncology consulted -  OP f/u on 5/22  - palliative care consulted - prn percocet ordered  - qualifies for home oxygen per 6 min walk test performed on 5/14; home oxygen order placed  - persistently hypoxic 5/16 - 78% on RA; repeat CXR - worsened  - Pulmonary revisit 5/17, 5/19 for thoracentesis  - Pleurx 5/20   - DNR, election to comfort care plan with hospice as of 5/20.  SW assisting.         Hypophosphatemia  Hypophosphatemia  - phos 1.5, replaced  - daily phos    Pleural effusion, left  - unclear cause >> new onset CHF vs infection vs malignancy  - BNP <10  - trial lasix 20mg IVP  - CT chest reviewed  - pulm consulted, appreciate assistance    High cholesterol  -High cholesterol  - not on statin  - lipid panel reviewed    HTN (hypertension)  HTN (hypertension)  - not on home meds  - controlled currently    AIDS (acquired immunodeficiency syndrome), CD4 <=200  Human immunodeficiency virus (HIV) disease  AIDS  - non-adherent to ART; off since 2020  - CD4 115  - ID consulted: starting Bactrim for ppx  - ID arranging HOP clinic follow up. I would prefer that the patient present to HOP clinic prior to restarting ART       VTE Risk Mitigation (From admission, onward)           Ordered     Place WILBER hose  Until discontinued         05/08/24 2112     IP VTE LOW RISK PATIENT  Once         05/08/24 2112                    Discharge Planning   MITCH: 5/21/2024     Code Status: DNR   Is the patient medically ready for discharge?:     Reason for patient still in hospital (select all that apply): Pending  disposition  Discharge Plan A: [facility] hospice                 Jani Bravo MD  Department of Hospital Medicine   [facility] - Observation 11H

## 2024-05-21 ENCOUNTER — TELEPHONE (OUTPATIENT)
Dept: HEMATOLOGY/ONCOLOGY | Facility: CLINIC | Age: 64
End: 2024-05-21
Payer: MEDICAID

## 2024-05-21 PROCEDURE — 25000003 PHARM REV CODE 250

## 2024-05-21 PROCEDURE — 94640 AIRWAY INHALATION TREATMENT: CPT

## 2024-05-21 PROCEDURE — 25000242 PHARM REV CODE 250 ALT 637 W/ HCPCS: Performed by: HOSPITALIST

## 2024-05-21 PROCEDURE — 25000242 PHARM REV CODE 250 ALT 637 W/ HCPCS

## 2024-05-21 PROCEDURE — 11000001 HC ACUTE MED/SURG PRIVATE ROOM

## 2024-05-21 PROCEDURE — 63600175 PHARM REV CODE 636 W HCPCS: Performed by: INTERNAL MEDICINE

## 2024-05-21 PROCEDURE — 99900035 HC TECH TIME PER 15 MIN (STAT)

## 2024-05-21 PROCEDURE — 94799 UNLISTED PULMONARY SVC/PX: CPT

## 2024-05-21 PROCEDURE — 94664 DEMO&/EVAL PT USE INHALER: CPT

## 2024-05-21 PROCEDURE — 27000221 HC OXYGEN, UP TO 24 HOURS

## 2024-05-21 PROCEDURE — 27000646 HC AEROBIKA DEVICE

## 2024-05-21 PROCEDURE — 25000003 PHARM REV CODE 250: Performed by: INTERNAL MEDICINE

## 2024-05-21 PROCEDURE — 25000242 PHARM REV CODE 250 ALT 637 W/ HCPCS: Performed by: INTERNAL MEDICINE

## 2024-05-21 PROCEDURE — 94668 MNPJ CHEST WALL SBSQ: CPT

## 2024-05-21 PROCEDURE — 94761 N-INVAS EAR/PLS OXIMETRY MLT: CPT

## 2024-05-21 PROCEDURE — 99233 SBSQ HOSP IP/OBS HIGH 50: CPT | Mod: ,,, | Performed by: STUDENT IN AN ORGANIZED HEALTH CARE EDUCATION/TRAINING PROGRAM

## 2024-05-21 PROCEDURE — 99497 ADVNCD CARE PLAN 30 MIN: CPT | Mod: ,,, | Performed by: STUDENT IN AN ORGANIZED HEALTH CARE EDUCATION/TRAINING PROGRAM

## 2024-05-21 RX ORDER — MORPHINE SULFATE 4 MG/ML
2 INJECTION, SOLUTION INTRAMUSCULAR; INTRAVENOUS EVERY 4 HOURS
Status: DISCONTINUED | OUTPATIENT
Start: 2024-05-21 | End: 2024-05-23

## 2024-05-21 RX ORDER — MECLIZINE HCL 12.5 MG 12.5 MG/1
25 TABLET ORAL 2 TIMES DAILY
Status: DISCONTINUED | OUTPATIENT
Start: 2024-05-21 | End: 2024-05-29 | Stop reason: HOSPADM

## 2024-05-21 RX ORDER — DEXAMETHASONE SODIUM PHOSPHATE 4 MG/ML
2 INJECTION, SOLUTION INTRA-ARTICULAR; INTRALESIONAL; INTRAMUSCULAR; INTRAVENOUS; SOFT TISSUE EVERY 12 HOURS
Status: DISCONTINUED | OUTPATIENT
Start: 2024-05-21 | End: 2024-05-23

## 2024-05-21 RX ADMIN — BENZONATATE 100 MG: 100 CAPSULE ORAL at 08:05

## 2024-05-21 RX ADMIN — BENZONATATE 100 MG: 100 CAPSULE ORAL at 04:05

## 2024-05-21 RX ADMIN — MORPHINE SULFATE 2 MG: 4 INJECTION INTRAVENOUS at 04:05

## 2024-05-21 RX ADMIN — IPRATROPIUM BROMIDE AND ALBUTEROL SULFATE 3 ML: 2.5; .5 SOLUTION RESPIRATORY (INHALATION) at 11:05

## 2024-05-21 RX ADMIN — DEXAMETHASONE SODIUM PHOSPHATE 2 MG: 4 INJECTION INTRA-ARTICULAR; INTRALESIONAL; INTRAMUSCULAR; INTRAVENOUS; SOFT TISSUE at 04:05

## 2024-05-21 RX ADMIN — IPRATROPIUM BROMIDE AND ALBUTEROL SULFATE 3 ML: 2.5; .5 SOLUTION RESPIRATORY (INHALATION) at 05:05

## 2024-05-21 RX ADMIN — IPRATROPIUM BROMIDE AND ALBUTEROL SULFATE 3 ML: 2.5; .5 SOLUTION RESPIRATORY (INHALATION) at 07:05

## 2024-05-21 RX ADMIN — GUAIFENESIN 1200 MG: 600 TABLET, EXTENDED RELEASE ORAL at 09:05

## 2024-05-21 RX ADMIN — BENZONATATE 100 MG: 100 CAPSULE ORAL at 09:05

## 2024-05-21 RX ADMIN — GUAIFENESIN 1200 MG: 600 TABLET, EXTENDED RELEASE ORAL at 08:05

## 2024-05-21 RX ADMIN — SODIUM CHLORIDE SOLN NEBU 3% 4 ML: 3 NEBU SOLN at 03:05

## 2024-05-21 RX ADMIN — SODIUM CHLORIDE SOLN NEBU 3% 4 ML: 3 NEBU SOLN at 07:05

## 2024-05-21 RX ADMIN — IPRATROPIUM BROMIDE AND ALBUTEROL SULFATE 3 ML: 2.5; .5 SOLUTION RESPIRATORY (INHALATION) at 03:05

## 2024-05-21 RX ADMIN — MORPHINE SULFATE 2 MG: 4 INJECTION INTRAVENOUS at 09:05

## 2024-05-21 RX ADMIN — IPRATROPIUM BROMIDE AND ALBUTEROL SULFATE 3 ML: 2.5; .5 SOLUTION RESPIRATORY (INHALATION) at 08:05

## 2024-05-21 RX ADMIN — MECLIZINE 25 MG: 12.5 TABLET ORAL at 04:05
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
## 2024-05-21 NOTE — PLAN OF CARE
Problem: Adult Inpatient Plan of Care  Goal: Plan of Care Review  Outcome: Progressing  Goal: Patient-Specific Goal (Individualized)  Outcome: Progressing  Goal: Absence of Hospital-Acquired Illness or Injury  Outcome: Progressing  Goal: Optimal Comfort and Wellbeing  Outcome: Progressing  Goal: Readiness for Transition of Care  Outcome: Progressing     Problem: COPD (Chronic Obstructive Pulmonary Disease)  Goal: Optimal Chronic Illness Coping  Outcome: Progressing  Goal: Optimal Level of Functional [place]  Outcome: Progressing  Goal: Absence of Infection Signs and Symptoms  Outcome: Progressing  Goal: Improved Oral Intake  Outcome: Progressing  Goal: Effective Oxygenation and Ventilation  Outcome: Progressing     Problem: Infection  Goal: Absence of Infection Signs and Symptoms  Outcome: Progressing     Problem: Coping Ineffective  Goal: Effective Coping  Outcome: Progressing     Problem: HIV/AIDS Infection  Goal: HIV/AIDS Symptom Control  Outcome: Progressing     Problem: Gas Exchange Impaired  Goal: Optimal Gas Exchange  Outcome: Progressing     Problem: Comorbidity Management  Goal: Blood Pressure in Desired Range  Outcome: Progressing     Problem: Electrolyte Imbalance  Goal: Electrolyte Balance  Outcome: Progressing     Problem: Oncology Care  Goal: Effective Coping  Outcome: Progressing  Goal: Improved Activity Tolerance  Outcome: Progressing  Goal: Optimal Oral Intake  Outcome: Progressing  Goal: Improved Oral Mucous Membrane Integrity  Outcome: Progressing  Goal: Optimal Pain Control and Function  Outcome: Progressing     Problem: Fall Injury Risk  Goal: Absence of Fall and Fall-Related Injury  Outcome: Progressing     Problem: Skin Injury Risk Increased  Goal: Skin Health and Integrity  Outcome: Progressing

## 2024-05-21 NOTE — SUBJECTIVE & OBJECTIVE
Interval History: Chart reviewed including 24h medication use. Patient sitting on side of bed, short of breath after using the bathroom. Notes no BM in the past several days    Palliative ROS:  PRNs: oxycodone 5-APAP x3 (22 OME), morphine 4mg x2 (24 OME)  Pain + (chest discomfort, constant aching pain, worse with deep breathing; notes oxycodone helps significantly with pain and dyspnea)  Dyspnea +  Nausea -  Anxiety -  Agitation -  Constipation -      No past medical history on file.    No past surgical history on file.    Review of patient's allergies indicates:   Allergen Reactions    Shellfish containing products Anaphylaxis     Allergic to all fish       Medications:  Continuous Infusions:  Scheduled Meds:   albuterol-ipratropium  3 mL Nebulization Q4H    benzonatate  100 mg Oral TID    guaiFENesin  1,200 mg Oral BID    sodium chloride 3%  4 mL Nebulization Q8H    sulfamethoxazole-trimethoprim 800-160mg  1 tablet Oral Every Mon, Wed, Fri     PRN Meds:  Current Facility-Administered Medications:     albuterol-ipratropium, 3 mL, Nebulization, Q6H PRN    bisacodyL, 10 mg, Rectal, Daily PRN    dextrose 10%, 12.5 g, Intravenous, PRN    dextrose 10%, 25 g, Intravenous, PRN    glucagon (human recombinant), 1 mg, Intramuscular, PRN    glucose, 16 g, Oral, PRN    glucose, 24 g, Oral, PRN    hydrOXYzine HCL, 10 mg, Oral, TID PRN    iohexol, 15 mL, Oral, PRN    melatonin, 6 mg, Oral, Nightly PRN    morphine, 2 mg, Intravenous, Q4H PRN    ondansetron, 8 mg, Oral, Q8H PRN    oxyCODONE-acetaminophen, 1 tablet, Oral, Q4H PRN    polyethylene glycol, 17 g, Oral, Daily PRN    promethazine, 25 mg, Oral, Q6H PRN    Family History    None       Tobacco Use    Smoking status: Not on file    Smokeless tobacco: Not on file   Substance and Sexual Activity    Alcohol use: Not on file    Drug use: Not on file    Sexual activity: Not on file         Objective:     Vital Signs (Most Recent):  Temp: 98.5 °F (36.9 °C) (05/21/24 0736)  Pulse:  "88 (05/21/24 0758)  Resp: 18 (05/21/24 0758)  BP: 133/77 (05/21/24 0736)  SpO2: 99 % (05/21/24 0758) Vital Signs (24h Range):  Temp:  [96.6 °F (35.9 °C)-98.6 °F (37 °C)] 98.5 °F (36.9 °C)  Pulse:  [68-99] 88  Resp:  [16-20] 18  SpO2:  [91 %-99 %] 99 %  BP: (114-144)/(77-87) 133/77     Weight: 66.1 kg (145 lb 11.6 oz)  Body mass index is 24.25 kg/m².       Physical Exam  Vitals and nursing note reviewed.   Constitutional:       Comments: Older, thin, ill-appearing male, conversant; occasional grimacing    Eyes:      Extraocular Movements: Extraocular movements intact.   Pulmonary:      Comments: Significant dyspnea on mild exertion, able to speak in sentences at rest; productive cough with sanna sputum  Abdominal:      General: Abdomen is flat.      Palpations: Abdomen is soft.   Skin:     General: Skin is warm and dry.   Neurological:      General: No focal deficit present.      Mental Status: He is oriented to person, place, and time. Mental status is at baseline.   Psychiatric:         Mood and Affect: Mood normal.         Behavior: Behavior normal.         Thought Content: Thought content normal.         Judgment: Judgment normal.                       CBC:   Recent Labs   Lab 05/19/24  1753   WBC 12.67   HGB 15.7   HCT 49.2   MCV 89        BMP:  No results for input(s): "GLU", "NA", "K", "CL", "CO2", "BUN", "CREATININE", "CALCIUM", "MG" in the last 24 hours.  LFT:  Lab Results   Component Value Date    AST 27 05/08/2024    ALKPHOS 98 05/08/2024    BILITOT 0.2 05/08/2024     Albumin:   Albumin   Date Value Ref Range Status   05/08/2024 2.9 (L) 3.5 - 5.2 g/dL Final     Protein:   Total Protein   Date Value Ref Range Status   05/08/2024 6.9 6.0 - 8.4 g/dL Final     Lactic acid:   Lab Results   Component Value Date    LACTATE 0.9 05/20/2024    LACTATE 1.7 05/19/2024       Reviewed CBC with stable blood counts    Advance Directives:   Do Not Resuscitate Status: Yes    Medical Power of : Yes  " "    Decision Making:  Patient answered questions and Family answered questions  Goals of Care: Met with patient and his cousin at bedside. Briefly reviewed prior conversations with myself and Dr Bravo. Patient shares that despite previously wanting to explore treatment options, now believed the burden (logistically and symptomatically) of receiving palliative cancer treatments likely outweighs the benefits for him. He shares that he feel like the Lord is "waving him home" and that he "doesn't want anyone to get in the way". I reviewed the philosophy and scope of hospice care which aligns with his values. I also emphasized the importance of continue symptom management and support. Patient appreciative of visit.    "
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
## 2024-05-21 NOTE — ASSESSMENT & PLAN NOTE
64m with untreated HIV and tobacco abuse who has new diagnosis of advanced lung adenocarcinoma with likely liver and spine mets. Likely weight loss ~25lbs since 2020 per outside records.    5/21 pleural fluid continuing to reaccumulate, pulm reconsulted, placed pleur-x drain    -now patient opting to pursue hospice and comfort-focused care rather than seek out palliative chemo/radiation

## 2024-05-21 NOTE — PLAN OF CARE
Problem: Adult Inpatient Plan of Care  Goal: Plan of Care Review  Outcome: Progressing  Goal: Patient-Specific Goal (Individualized)  Outcome: Progressing  Goal: Absence of Hospital-Acquired Illness or Injury  Outcome: Progressing  Goal: Optimal Comfort and Wellbeing  Outcome: Progressing  Goal: Readiness for Transition of Care  Outcome: Progressing     Problem: COPD (Chronic Obstructive Pulmonary Disease)  Goal: Optimal Chronic Illness Coping  Outcome: Progressing  Goal: Optimal Level of Functional [place]  Outcome: Progressing  Goal: Absence of Infection Signs and Symptoms  Outcome: Progressing  Goal: Improved Oral Intake  Outcome: Progressing  Goal: Effective Oxygenation and Ventilation  Outcome: Progressing     Problem: Infection  Goal: Absence of Infection Signs and Symptoms  Outcome: Progressing     Problem: Coping Ineffective  Goal: Effective Coping  Outcome: Progressing     Problem: HIV/AIDS Infection  Goal: HIV/AIDS Symptom Control  Outcome: Progressing     Problem: Gas Exchange Impaired  Goal: Optimal Gas Exchange  Outcome: Progressing     Problem: Comorbidity Management  Goal: Blood Pressure in Desired Range  Outcome: Progressing     Problem: Electrolyte Imbalance  Goal: Electrolyte Balance  Outcome: Progressing     Problem: Oncology Care  Goal: Effective Coping  Outcome: Progressing  Goal: Improved Activity Tolerance  Outcome: Progressing  Goal: Optimal Oral Intake  Outcome: Progressing  Goal: Improved Oral Mucous Membrane Integrity  Outcome: Progressing  Goal: Optimal Pain Control and Function  Outcome: Progressing     Problem: Fall Injury Risk  Goal: Absence of Fall and Fall-Related Injury  Outcome: Progressing     Problem: Skin Injury Risk Increased  Goal: Skin Health and Integrity  Outcome: Progressing

## 2024-05-21 NOTE — SUBJECTIVE & OBJECTIVE
"Interval History:     Increasing pain meds to scheduled - 2mg IV morphine q4h.  Starting dexmethasone 2mg IV BID for adjuvant pain control.  Start meclizine for dizziness.   Discussed possibility of lung cancer mets to brain and possible need for imaging if warranted. Patient does not desire for further imaging - "I do not want to go in any other machines."   Steroids will confer benefit if brain mets.       Review of Systems   Constitutional:  Positive for fatigue. Negative for chills and fever.   HENT:  Positive for tinnitus. Negative for congestion and sore throat.    Eyes:  Negative for pain.   Respiratory:  Positive for cough and shortness of breath.    Cardiovascular:  Negative for chest pain, palpitations and leg swelling.   Gastrointestinal:  Negative for abdominal pain, constipation, diarrhea, nausea and vomiting.   Genitourinary:  Negative for difficulty urinating, dysuria and hematuria.   Musculoskeletal:  Negative for back pain.   Skin:  Negative for rash.   Neurological:  Negative for light-headedness and headaches.   Hematological:  Does not bruise/bleed easily.   Psychiatric/Behavioral:  Negative for agitation.      Objective:     Vital Signs (Most Recent):  Temp: 98.4 °F (36.9 °C) (05/21/24 1635)  Pulse: 96 (05/21/24 1635)  Resp: 18 (05/21/24 1635)  BP: 122/85 (05/21/24 1635)  SpO2: (!) 93 % (05/21/24 1635) Vital Signs (24h Range):  Temp:  [96.6 °F (35.9 °C)-98.6 °F (37 °C)] 98.4 °F (36.9 °C)  Pulse:  [84-98] 96  Resp:  [17-20] 18  SpO2:  [92 %-99 %] 93 %  BP: (114-143)/(77-88) 122/85     Weight: 66.1 kg (145 lb 11.6 oz)  Body mass index is 24.25 kg/m².    Intake/Output Summary (Last 24 hours) at 5/21/2024 1653  Last data filed at 5/21/2024 1548  Gross per 24 hour   Intake 120 ml   Output 750 ml   Net -630 ml         Physical Exam  Vitals and nursing note reviewed.   Constitutional:       Comments: Older, thin, ill-appearing male, conversant; occasional grimacing    Eyes:      Extraocular Movements: " Extraocular movements intact.   Pulmonary:      Comments: frequent cough interrupting ability to speak in full sentences. Diminished breath sounds L all fields.  Bases more diminished   Abdominal:      General: Abdomen is flat.      Palpations: Abdomen is soft.   Skin:     General: Skin is warm and dry.   Neurological:      General: No focal deficit present.      Mental Status: He is oriented to person, place, and time. Mental status is at baseline.   Psychiatric:         Mood and Affect: Mood normal.         Behavior: Behavior normal.         Thought Content: Thought content normal.         Judgment: Judgment normal.             Significant Labs: All pertinent labs within the past 24 hours have been reviewed.    Significant Imaging: I have reviewed all pertinent imaging results/findings within the past 24 hours.

## 2024-05-21 NOTE — PROGRESS NOTES
[facility] [address] [place] [address] Medicine  Progress Note    Patient Name: Oscar Waldron  MRN: [id number]  Patient Class: IP- Inpatient   Admission Date: 5/8/2024  Length of Stay: 12 days  Attending Physician: Jani Bravo MD  Primary Care Provider: Kenna, Primary Doctor        Subjective:     Principal Problem:Adenocarcinoma, lung        HPI:  Oscar Waldron is a 64 y.o. male with a PMHx of cocaine abuse, HIV not on HAART, tobacco abuse, HTN who present to [facility] for evaluation of shortness of breath. Patient reports progressive worsening of shortness of breath over the past few weeks. He now has a  productive cough with associated left sided chest/ left upper abdomen pain which only occurs during coughing fits. Shortness of breath worsens while lying flat and with minimal exertion. No improvement with albuterol inhaler that he borrowed from a family member. Admits to smoking crack cocaine 2 days ago. Has >40 year hx of tobacco abuse. He stopped taking his HIV medications a few years ago.      ED: tachycardic to , improved without intervention. Satting 96% on 3L NC. BNP <10, trop 0.010. EKG with non-specifically TWAs. CXR with cardiomegaly with pulmonary vascular congestion and bilateral interstitial opacities, moderate left-sided pleural effusion with associated compressive atelectasis. Given solumedrol 125mg, azithro and rocephin.     Overview/Hospital Course:  Oscar Waldron is placed in  Observation for management of acute respiratory failure with hypoxia. Requiring supplemental oxygen on admit. CXR reviewed. Received antibiotics and IV steroids in ED. Started on oral prednisone, continuing IV rocephin and azithromycin. CT chest reviewed, concern for lung cancer and metastatic process.     Interval History:     Increasing pain meds to scheduled - 2mg IV morphine q4h.  Starting dexmethasone 2mg IV BID for adjuvant pain control.  Start meclizine for dizziness.   Discussed possibility of lung cancer mets to  "brain and possible need for imaging if warranted. Patient does not desire for further imaging - "I do not want to go in any other machines."   Steroids will confer benefit if brain mets.       Review of Systems   Constitutional:  Positive for fatigue. Negative for chills and fever.   HENT:  Positive for tinnitus. Negative for congestion and sore throat.    Eyes:  Negative for pain.   Respiratory:  Positive for cough and shortness of breath.    Cardiovascular:  Negative for chest pain, palpitations and leg swelling.   Gastrointestinal:  Negative for abdominal pain, constipation, diarrhea, nausea and vomiting.   Genitourinary:  Negative for difficulty urinating, dysuria and hematuria.   Musculoskeletal:  Negative for back pain.   Skin:  Negative for rash.   Neurological:  Negative for light-headedness and headaches.   Hematological:  Does not bruise/bleed easily.   Psychiatric/Behavioral:  Negative for agitation.      Objective:     Vital Signs (Most Recent):  Temp: 98.4 °F (36.9 °C) (05/21/24 1635)  Pulse: 96 (05/21/24 1635)  Resp: 18 (05/21/24 1635)  BP: 122/85 (05/21/24 1635)  SpO2: (!) 93 % (05/21/24 1635) Vital Signs (24h Range):  Temp:  [96.6 °F (35.9 °C)-98.6 °F (37 °C)] 98.4 °F (36.9 °C)  Pulse:  [84-98] 96  Resp:  [17-20] 18  SpO2:  [92 %-99 %] 93 %  BP: (114-143)/(77-88) 122/85     Weight: 66.1 kg (145 lb 11.6 oz)  Body mass index is 24.25 kg/m².    Intake/Output Summary (Last 24 hours) at 5/21/2024 1653  Last data filed at 5/21/2024 1548  Gross per 24 hour   Intake 120 ml   Output 750 ml   Net -630 ml         Physical Exam  Vitals and nursing note reviewed.   Constitutional:       Comments: Older, thin, ill-appearing male, conversant; occasional grimacing    Eyes:      Extraocular Movements: Extraocular movements intact.   Pulmonary:      Comments: frequent cough interrupting ability to speak in full sentences. Diminished breath sounds L all fields.  Bases more diminished   Abdominal:      General: Abdomen " is flat.      Palpations: Abdomen is soft.   Skin:     General: Skin is warm and dry.   Neurological:      General: No focal deficit present.      Mental Status: He is oriented to person, place, and time. Mental status is at baseline.   Psychiatric:         Mood and Affect: Mood normal.         Behavior: Behavior normal.         Thought Content: Thought content normal.         Judgment: Judgment normal.             Significant Labs: All pertinent labs within the past 24 hours have been reviewed.    Significant Imaging: I have reviewed all pertinent imaging results/findings within the past 24 hours.    Assessment/Plan:      Acute respiratory failure with hypoxia  Acute respiratory failure with hypoxia  Abnormal CT of lung  Metastatic lung adenocarcinoma  Left pleural effusion  - CT chest reviewed:  1. Solid spiculated nodule in the left lung apex and irregular consolidation area in the lingula, worrisome for lung cancer.   2. Ground-glass nodule in the right middle lobe, may represent infectious/inflammatory process versus primary lung cancer.  Consider attention on follow-up studies.   3. Bilateral irregular pulmonary nodules, may be sequela of infectious/inflammatory process versus neoplastic nodules.  Consider attention on follow-up.   4. Ill-defined hypoattenuation of the inferior hepatic lobe and few lucent lesions in the vertebral body of L1, nonspecific but worrisome for metastatic process.   5. Left adrenal nodule.      - CT abdomen reviewed:   Few heterogeneously hypodense liver lesions suspicious for metastasis.   Indeterminate indistinct lucent lesions and L1 vertebral body and right intertrochanteric region.  Metastasis not excluded.  Further evaluation could be obtained with contrast enhanced MRI.      - received rocephin/azithro/prednisone; prn duonebs  - acapella and CPT ordered  - pulm & ID consulted  - thora 5/9 with 950 ml removed; pleural fluid studies suggestive of exudative process.  - sputum cx  - normal respiratory av  - mTB PCR negative  - PJP PCP negative  - aspergillus antigen negative  - fungitell negative  - crypto antigen negative  - AFB smear negative x 3. Follow AFB cultures  - pleural fluid cultures negative  - pleural fluid cytology shows lung adenocarcinoma       - imaging suggestive of metastatic disease  - oncology consulted -  OP f/u on 5/22  - palliative care consulted - prn percocet ordered  - qualifies for home oxygen per 6 min walk test performed on 5/14; home oxygen order placed  - persistently hypoxic 5/16 - 78% on RA; repeat CXR - worsened  - Pulmonary revisit 5/17, 5/19 for thoracentesis  - Pleurx 5/20   - DNR, election to comfort care plan with hospice as of 5/20.  SW assisting.         Hypophosphatemia  Hypophosphatemia  - phos 1.5, replaced  - daily phos    Pleural effusion, left  - unclear cause >> new onset CHF vs infection vs malignancy  - BNP <10  - trial lasix 20mg IVP  - CT chest reviewed  - pulm consulted, appreciate assistance    High cholesterol  -High cholesterol  - not on statin  - lipid panel reviewed    HTN (hypertension)  HTN (hypertension)  - not on home meds  - controlled currently    AIDS (acquired immunodeficiency syndrome), CD4 <=200  Human immunodeficiency virus (HIV) disease  AIDS  - non-adherent to ART; off since 2020  - CD4 115  - ID consulted: starting Bactrim for ppx  - ID arranging HOP clinic follow up. I would prefer that the patient present to HOP clinic prior to restarting ART       VTE Risk Mitigation (From admission, onward)           Ordered     Place WILBER hose  Until discontinued         05/08/24 2112     IP VTE LOW RISK PATIENT  Once         05/08/24 2112                    Discharge Planning   MITCH: 5/23/2024     Code Status: DNR   Is the patient medically ready for discharge?:     Reason for patient still in hospital (select all that apply): Pending disposition  Discharge Plan A: Home with family   Discharge Delays: None known at this  time              Jani Bravo MD  Department of Hospital Medicine   [facility] - Observation 11H

## 2024-05-21 NOTE — DISCHARGE INSTRUCTIONS
For Instructions on PleurX drainage: https://www.ASOCStube.com/watch?v=[email or web address] (Official  instructions)  - Drain your chest twice weekly with the bottles provided.

## 2024-05-21 NOTE — PLAN OF CARE
05/21/24 1455   Post-Acute Status   Post-Acute Authorization Placement;Hospice   Post-Acute Placement Status Referrals Sent   Hospice Status Referrals Sent   Hospital Resources/Appts/Education Provided Provided patient/caregiver with written discharge plan information   Patient choice form signed by patient/caregiver List with quality metrics by geographic area provided   Discharge Delays None known at this time     Met with pt to review discharge recommendation of Nursing Home with Hospice Care and is agreeable to plan    Patient/family provided list of facilities in-network with patient's payor plan. Providers that are owned, operated, or affiliated with [facility] are included on the list.     Notified that referral sent to below listed facilities from in-network list based on proximity to home/family support:   [facility]   2.   [place]   3.   [facility]   4.  5. (can send more than 5)    Patient/family instructed to identify preference.    Preferred Facility: (if more than 1, listed in order of descending preference)  No preference, but preferred [place] location if possible     If an additional preferred facility not listed above is identified, additional referral to be sent. If above facilities unable to accept, will send additional referrals to in-network providers.      Holly Moser, BECKY  [facility]  Ext. [phone]

## 2024-05-22 PROCEDURE — 63600175 PHARM REV CODE 636 W HCPCS: Performed by: INTERNAL MEDICINE

## 2024-05-22 PROCEDURE — 25000242 PHARM REV CODE 250 ALT 637 W/ HCPCS: Performed by: HOSPITALIST

## 2024-05-22 PROCEDURE — 11000001 HC ACUTE MED/SURG PRIVATE ROOM

## 2024-05-22 PROCEDURE — 25000242 PHARM REV CODE 250 ALT 637 W/ HCPCS: Performed by: INTERNAL MEDICINE

## 2024-05-22 PROCEDURE — 25000003 PHARM REV CODE 250: Performed by: HOSPITALIST

## 2024-05-22 PROCEDURE — 99232 SBSQ HOSP IP/OBS MODERATE 35: CPT | Mod: ,,, | Performed by: STUDENT IN AN ORGANIZED HEALTH CARE EDUCATION/TRAINING PROGRAM

## 2024-05-22 PROCEDURE — 25000003 PHARM REV CODE 250: Performed by: INTERNAL MEDICINE

## 2024-05-22 PROCEDURE — 94640 AIRWAY INHALATION TREATMENT: CPT

## 2024-05-22 PROCEDURE — 99900035 HC TECH TIME PER 15 MIN (STAT)

## 2024-05-22 PROCEDURE — 94668 MNPJ CHEST WALL SBSQ: CPT

## 2024-05-22 PROCEDURE — 25000003 PHARM REV CODE 250

## 2024-05-22 PROCEDURE — 94664 DEMO&/EVAL PT USE INHALER: CPT

## 2024-05-22 PROCEDURE — 94761 N-INVAS EAR/PLS OXIMETRY MLT: CPT

## 2024-05-22 PROCEDURE — 27000221 HC OXYGEN, UP TO 24 HOURS

## 2024-05-22 RX ORDER — OXYCODONE AND ACETAMINOPHEN 5; 325 MG/1; MG/1
1 TABLET ORAL EVERY 4 HOURS PRN
Start: 2024-05-22 | End: 2024-05-28 | Stop reason: HOSPADM

## 2024-05-22 RX ORDER — DEXAMETHASONE SODIUM PHOSPHATE 4 MG/ML
2 INJECTION, SOLUTION INTRA-ARTICULAR; INTRALESIONAL; INTRAMUSCULAR; INTRAVENOUS; SOFT TISSUE EVERY 12 HOURS
Start: 2024-05-22 | End: 2024-05-27 | Stop reason: HOSPADM

## 2024-05-22 RX ORDER — MECLIZINE HYDROCHLORIDE 25 MG/1
25 TABLET ORAL 2 TIMES DAILY
Start: 2024-05-22 | End: 2024-05-27 | Stop reason: HOSPADM

## 2024-05-22 RX ORDER — IPRATROPIUM BROMIDE AND ALBUTEROL SULFATE 2.5; .5 MG/3ML; MG/3ML
3 SOLUTION RESPIRATORY (INHALATION) EVERY 6 HOURS PRN
Qty: 90 ML | Refills: 0 | Status: SHIPPED | OUTPATIENT
Start: 2024-05-22 | End: 2025-05-22

## 2024-05-22 RX ADMIN — SODIUM CHLORIDE SOLN NEBU 3% 4 ML: 3 NEBU SOLN at 12:05

## 2024-05-22 RX ADMIN — MORPHINE SULFATE 2 MG: 4 INJECTION INTRAVENOUS at 02:05

## 2024-05-22 RX ADMIN — IPRATROPIUM BROMIDE AND ALBUTEROL SULFATE 3 ML: 2.5; .5 SOLUTION RESPIRATORY (INHALATION) at 08:05

## 2024-05-22 RX ADMIN — MORPHINE SULFATE 2 MG: 4 INJECTION INTRAVENOUS at 09:05

## 2024-05-22 RX ADMIN — MORPHINE SULFATE 2 MG: 4 INJECTION INTRAVENOUS at 05:05

## 2024-05-22 RX ADMIN — IPRATROPIUM BROMIDE AND ALBUTEROL SULFATE 3 ML: 2.5; .5 SOLUTION RESPIRATORY (INHALATION) at 12:05

## 2024-05-22 RX ADMIN — BENZONATATE 100 MG: 100 CAPSULE ORAL at 02:05

## 2024-05-22 RX ADMIN — IPRATROPIUM BROMIDE AND ALBUTEROL SULFATE 3 ML: 2.5; .5 SOLUTION RESPIRATORY (INHALATION) at 07:05

## 2024-05-22 RX ADMIN — MORPHINE SULFATE 2 MG: 4 INJECTION INTRAVENOUS at 06:05

## 2024-05-22 RX ADMIN — BENZONATATE 100 MG: 100 CAPSULE ORAL at 09:05

## 2024-05-22 RX ADMIN — GUAIFENESIN 1200 MG: 600 TABLET, EXTENDED RELEASE ORAL at 09:05

## 2024-05-22 RX ADMIN — IPRATROPIUM BROMIDE AND ALBUTEROL SULFATE 3 ML: 2.5; .5 SOLUTION RESPIRATORY (INHALATION) at 03:05

## 2024-05-22 RX ADMIN — MECLIZINE 25 MG: 12.5 TABLET ORAL at 09:05

## 2024-05-22 RX ADMIN — SODIUM CHLORIDE SOLN NEBU 3% 4 ML: 3 NEBU SOLN at 07:05

## 2024-05-22 RX ADMIN — SODIUM CHLORIDE SOLN NEBU 3% 4 ML: 3 NEBU SOLN at 03:05

## 2024-05-22 RX ADMIN — DEXAMETHASONE SODIUM PHOSPHATE 2 MG: 4 INJECTION INTRA-ARTICULAR; INTRALESIONAL; INTRAMUSCULAR; INTRAVENOUS; SOFT TISSUE at 09:05

## 2024-05-22 RX ADMIN — IPRATROPIUM BROMIDE AND ALBUTEROL SULFATE 3 ML: 2.5; .5 SOLUTION RESPIRATORY (INHALATION) at 04:05

## 2024-05-22 RX ADMIN — SULFAMETHOXAZOLE AND TRIMETHOPRIM 1 TABLET: 800; 160 TABLET ORAL at 05:05

## 2024-05-22 NOTE — PLAN OF CARE
[facility]  Department of Hospital Medicine  [address] [place] [address]  [phone] after hours  [phone] fax    HOSPICE  ORDERS    05/22/2024    Admit to Hospice:  Home / Nursing Home    Diagnoses:   Active Hospital Problems    Diagnosis  POA    *Adenocarcinoma, lung [C34.90]  Yes    Acute respiratory failure with hypoxia [J96.01]  Yes     Priority: 1 - High    Malignant pleural effusion [J91.0]  Yes    Palliative care encounter [Z51.5]  Not Applicable    Hypodense mass of liver [R16.0]  Yes    Hypophosphatemia [E83.39]  No    Pleural effusion, left [J90]  Yes    HTN (hypertension) [I10]  Yes    High cholesterol [E78.00]  Yes    AIDS (acquired immunodeficiency syndrome), CD4 <=200 [B20]  Yes     dx update  Formatting of this note might be different from the original.   dx update        Resolved Hospital Problems   No resolved problems to display.       Hospice Qualifying Diagnoses: Lung Cancer       Patient has a life expectancy < 6 months due to:  Primary Hospice Diagnosis:  Lung Cancer    Vital Signs: Routine per Hospice Protocol.    Code Status:  DNR    Allergies:   Review of patient's allergies indicates:   Allergen Reactions    Shellfish containing products Anaphylaxis     Allergic to all fish       Diet: regular  Activities: As tolerated    Goals of Care Treatment Preferences:  Code Status: DNR      Nursing: Per Hospice Routine.      Routine Skin for Bedridden Patients: Apply moisture barrier cream to all skin folds and   wet areas in perineal area daily and after baths and all bowel movements.      Oxygen:  3L NC or to maintain sats >90%    Other Miscellaneous Care:     Pleurx drainage: remove 1000 ml twice weekly as tolerated             Medication List        START taking these medications      oxyCODONE-acetaminophen 5-325 mg per tablet  Commonly known as: PERCOCET  Take 1 tablet by mouth every 12 (twelve) hours as needed (severe pain only).      sulfamethoxazole-trimethoprim 800-160mg 800-160 mg Tab  Commonly known as: BACTRIM DS  Take 1 tablet by mouth every Mon, Wed, Fri.                Future Orders:  Hospice Medical Director may dictate new orders for comfortable care measures & sign death certificate.        _________________________________  Jani Bravo MD  05/22/2024
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
## 2024-05-22 NOTE — PLAN OF CARE
Pulmonology Plan of Care:    Reviewed patient's chart. Plans for hospice placement.    Provided bedside teaching on appropriate sterile technique to drain the pleurX catheter.   Drained 600 ml of fluid this morning.   Moving forward, would recommend draining up to 1000 ml twice weekly (as tolerated since patient had significant cough after removing 600 ml and asked to stop).   Dr. Macario signed paperwork for pleurX supplies. Appreciate CM help in securing that on discharge.     Placed instructions on the discharge tab, including link for video with patient instructions from the .     Austin Jacobs MD  Pulmonary Critical Care Medicine Fellow

## 2024-05-22 NOTE — PLAN OF CARE
SINDI faxed NH referrals to additional facilities for review.     11:13 AM SINDI spoke to [facility] with the Office of Aging and Adult Services to complete LOCET. Faxed, awaiting for approval. PASRR scanned to [facility].     3:29  scanned to [facility], still pending NH, additional referrals sent to plantation management (4) facilities.     Holly Moser, BECKY  [facility]  Ext. [phone]

## 2024-05-22 NOTE — PLAN OF CARE
Problem: Adult Inpatient Plan of Care  Goal: Plan of Care Review  Outcome: Progressing  Goal: Patient-Specific Goal (Individualized)  Outcome: Progressing  Goal: Absence of Hospital-Acquired Illness or Injury  Outcome: Progressing  Goal: Optimal Comfort and Wellbeing  Outcome: Progressing  Goal: Readiness for Transition of Care  Outcome: Progressing     Problem: COPD (Chronic Obstructive Pulmonary Disease)  Goal: Optimal Chronic Illness Coping  Outcome: Progressing  Goal: Optimal Level of Functional [place]  Outcome: Progressing  Goal: Absence of Infection Signs and Symptoms  Outcome: Progressing  Goal: Improved Oral Intake  Outcome: Progressing  Goal: Effective Oxygenation and Ventilation  Outcome: Progressing     Problem: Infection  Goal: Absence of Infection Signs and Symptoms  Outcome: Progressing     Problem: Coping Ineffective  Goal: Effective Coping  Outcome: Progressing     Problem: HIV/AIDS Infection  Goal: HIV/AIDS Symptom Control  Outcome: Progressing     Problem: Gas Exchange Impaired  Goal: Optimal Gas Exchange  Outcome: Progressing     Problem: Comorbidity Management  Goal: Blood Pressure in Desired Range  Outcome: Progressing     Problem: Electrolyte Imbalance  Goal: Electrolyte Balance  Outcome: Progressing     Problem: Oncology Care  Goal: Effective Coping  Outcome: Progressing  Goal: Improved Activity Tolerance  Outcome: Progressing  Goal: Optimal Oral Intake  Outcome: Progressing  Goal: Improved Oral Mucous Membrane Integrity  Outcome: Progressing  Goal: Optimal Pain Control and Function  Outcome: Progressing     Problem: Fall Injury Risk  Goal: Absence of Fall and Fall-Related Injury  Outcome: Progressing     Problem: Skin Injury Risk Increased  Goal: Skin Health and Integrity  Outcome: Progressing
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
## 2024-05-22 NOTE — PLAN OF CARE
Problem: Adult Inpatient Plan of Care  Goal: Plan of Care Review  Outcome: Progressing  Goal: Patient-Specific Goal (Individualized)  Outcome: Progressing  Goal: Absence of Hospital-Acquired Illness or Injury  Outcome: Progressing  Goal: Optimal Comfort and Wellbeing  Outcome: Progressing  Goal: Readiness for Transition of Care  Outcome: Progressing     Problem: COPD (Chronic Obstructive Pulmonary Disease)  Goal: Optimal Chronic Illness Coping  Outcome: Progressing  Goal: Optimal Level of Functional [place]  Outcome: Progressing  Goal: Absence of Infection Signs and Symptoms  Outcome: Progressing  Goal: Improved Oral Intake  Outcome: Progressing  Goal: Effective Oxygenation and Ventilation  Outcome: Progressing

## 2024-05-22 NOTE — SUBJECTIVE & OBJECTIVE
Interval History:     Pain controlled on current regiment.  Patient believes morphine is causing dizziness, but is tolerable due to pain control.  Affirms desire for no further imaging.  Hospice orders written, pleurx and pain medications will need to be reviewed with SW.       Review of Systems   Constitutional:  Positive for fatigue. Negative for chills and fever.   HENT:  Positive for tinnitus. Negative for congestion and sore throat.    Eyes:  Negative for pain.   Respiratory:  Positive for cough and shortness of breath.    Cardiovascular:  Negative for chest pain, palpitations and leg swelling.   Gastrointestinal:  Negative for abdominal pain, constipation, diarrhea, nausea and vomiting.   Genitourinary:  Negative for difficulty urinating, dysuria and hematuria.   Musculoskeletal:  Negative for back pain.   Skin:  Negative for rash.   Neurological:  Negative for light-headedness and headaches.   Hematological:  Does not bruise/bleed easily.   Psychiatric/Behavioral:  Negative for agitation.      Objective:     Vital Signs (Most Recent):  Temp: 97.9 °F (36.6 °C) (05/22/24 1628)  Pulse: 101 (05/22/24 1628)  Resp: 18 (05/22/24 1744)  BP: 131/84 (05/22/24 1628)  SpO2: (!) 93 % (05/22/24 1628) Vital Signs (24h Range):  Temp:  [97.9 °F (36.6 °C)-98.3 °F (36.8 °C)] 97.9 °F (36.6 °C)  Pulse:  [] 101  Resp:  [17-19] 18  SpO2:  [92 %-96 %] 93 %  BP: (130-147)/(76-88) 131/84     Weight: 66 kg (145 lb 8.1 oz)  Body mass index is 24.21 kg/m².    Intake/Output Summary (Last 24 hours) at 5/22/2024 6108  Last data filed at 5/22/2024 0426  Gross per 24 hour   Intake 180 ml   Output 400 ml   Net -220 ml         Physical Exam  Vitals and nursing note reviewed.   Constitutional:       Comments: Older, thin, ill-appearing male, conversant; occasional grimacing    Eyes:      Extraocular Movements: Extraocular movements intact.   Pulmonary:      Comments: frequent cough interrupting ability to speak in full sentences.  Diminished breath sounds L all fields.  Bases more diminished   Abdominal:      General: Abdomen is flat.      Palpations: Abdomen is soft.   Skin:     General: Skin is warm and dry.   Neurological:      General: No focal deficit present.      Mental Status: He is oriented to person, place, and time. Mental status is at baseline.   Psychiatric:         Mood and Affect: Mood normal.         Behavior: Behavior normal.         Thought Content: Thought content normal.         Judgment: Judgment normal.             Significant Labs: All pertinent labs within the past 24 hours have been reviewed.    Significant Imaging: I have reviewed all pertinent imaging results/findings within the past 24 hours.

## 2024-05-22 NOTE — SUBJECTIVE & OBJECTIVE
Interval History: Chart reviewed including 24h medication use. Patient lying comfortably in bed, awake and conversant, no family at bedside. Notes he feels dexamethasone helping with dyspnea and appetite    Palliative ROS:  PRNs: none  Pain + (chest discomfort, constant aching pain, worse with deep breathing; notes oxycodone helps significantly with pain and dyspnea)  Dyspnea +  Nausea -  Anxiety -  Agitation -  Constipation + (last BM 5/19)      No past medical history on file.    No past surgical history on file.    Review of patient's allergies indicates:   Allergen Reactions    Shellfish containing products Anaphylaxis     Allergic to all fish       Medications:  Continuous Infusions:  Scheduled Meds:   albuterol-ipratropium  3 mL Nebulization Q4H    benzonatate  100 mg Oral TID    dexAMETHasone  2 mg Intravenous Q12H    guaiFENesin  1,200 mg Oral BID    meclizine  25 mg Oral BID    morphine  2 mg Intravenous Q4H    sodium chloride 3%  4 mL Nebulization Q8H    sulfamethoxazole-trimethoprim 800-160mg  1 tablet Oral Every Mon, Wed, Fri     PRN Meds:  Current Facility-Administered Medications:     albuterol-ipratropium, 3 mL, Nebulization, Q6H PRN    bisacodyL, 10 mg, Rectal, Daily PRN    dextrose 10%, 12.5 g, Intravenous, PRN    dextrose 10%, 25 g, Intravenous, PRN    glucagon (human recombinant), 1 mg, Intramuscular, PRN    glucose, 16 g, Oral, PRN    glucose, 24 g, Oral, PRN    hydrOXYzine HCL, 10 mg, Oral, TID PRN    iohexol, 15 mL, Oral, PRN    melatonin, 6 mg, Oral, Nightly PRN    morphine, 2 mg, Intravenous, Q4H PRN    ondansetron, 8 mg, Oral, Q8H PRN    oxyCODONE-acetaminophen, 1 tablet, Oral, Q4H PRN    polyethylene glycol, 17 g, Oral, Daily PRN    promethazine, 25 mg, Oral, Q6H PRN    Family History    None       Tobacco Use    Smoking status: Not on file    Smokeless tobacco: Not on file   Substance and Sexual Activity    Alcohol use: Not on file    Drug use: Not on file    Sexual activity: Not on file  "        Objective:     Vital Signs (Most Recent):  Temp: 97.9 °F (36.6 °C) (05/22/24 1135)  Pulse: 77 (05/22/24 1206)  Resp: 17 (05/22/24 1206)  BP: (!) 147/85 (05/22/24 1135)  SpO2: 96 % (05/22/24 1206) Vital Signs (24h Range):  Temp:  [97.9 °F (36.6 °C)-98.4 °F (36.9 °C)] 97.9 °F (36.6 °C)  Pulse:  [68-96] 77  Resp:  [17-19] 17  SpO2:  [92 %-97 %] 96 %  BP: (122-147)/(76-88) 147/85     Weight: 66 kg (145 lb 8.1 oz)  Body mass index is 24.21 kg/m².       Physical Exam  Vitals and nursing note reviewed.   Constitutional:       Comments: Older, thin, ill-appearing male, conversant; occasional grimacing    Eyes:      Extraocular Movements: Extraocular movements intact.   Pulmonary:      Comments: Significant dyspnea on mild exertion, able to speak in sentences at rest; productive cough with sanna sputum  Abdominal:      General: Abdomen is flat.      Palpations: Abdomen is soft.   Skin:     General: Skin is warm and dry.   Neurological:      General: No focal deficit present.      Mental Status: He is oriented to person, place, and time. Mental status is at baseline.   Psychiatric:         Mood and Affect: Mood normal.         Behavior: Behavior normal.         Thought Content: Thought content normal.         Judgment: Judgment normal.                       CBC:   Recent Labs   Lab 05/19/24  1753   WBC 12.67   HGB 15.7   HCT 49.2   MCV 89        BMP:  No results for input(s): "GLU", "NA", "K", "CL", "CO2", "BUN", "CREATININE", "CALCIUM", "MG" in the last 24 hours.  LFT:  Lab Results   Component Value Date    AST 27 05/08/2024    ALKPHOS 98 05/08/2024    BILITOT 0.2 05/08/2024     Albumin:   Albumin   Date Value Ref Range Status   05/08/2024 2.9 (L) 3.5 - 5.2 g/dL Final     Protein:   Total Protein   Date Value Ref Range Status   05/08/2024 6.9 6.0 - 8.4 g/dL Final     Lactic acid:   Lab Results   Component Value Date    LACTATE 0.9 05/20/2024    LACTATE 1.7 05/19/2024       "

## 2024-05-23 PROCEDURE — 27000221 HC OXYGEN, UP TO 24 HOURS

## 2024-05-23 PROCEDURE — 99900035 HC TECH TIME PER 15 MIN (STAT)

## 2024-05-23 PROCEDURE — 94640 AIRWAY INHALATION TREATMENT: CPT

## 2024-05-23 PROCEDURE — 25000003 PHARM REV CODE 250: Performed by: HOSPITALIST

## 2024-05-23 PROCEDURE — 27000646 HC AEROBIKA DEVICE

## 2024-05-23 PROCEDURE — 25000242 PHARM REV CODE 250 ALT 637 W/ HCPCS: Performed by: INTERNAL MEDICINE

## 2024-05-23 PROCEDURE — 25000242 PHARM REV CODE 250 ALT 637 W/ HCPCS: Performed by: HOSPITALIST

## 2024-05-23 PROCEDURE — 94664 DEMO&/EVAL PT USE INHALER: CPT

## 2024-05-23 PROCEDURE — 25000003 PHARM REV CODE 250: Performed by: INTERNAL MEDICINE

## 2024-05-23 PROCEDURE — 94761 N-INVAS EAR/PLS OXIMETRY MLT: CPT

## 2024-05-23 PROCEDURE — 11000001 HC ACUTE MED/SURG PRIVATE ROOM

## 2024-05-23 PROCEDURE — 25000003 PHARM REV CODE 250

## 2024-05-23 PROCEDURE — 63600175 PHARM REV CODE 636 W HCPCS: Performed by: INTERNAL MEDICINE

## 2024-05-23 PROCEDURE — 94799 UNLISTED PULMONARY SVC/PX: CPT

## 2024-05-23 PROCEDURE — 94668 MNPJ CHEST WALL SBSQ: CPT

## 2024-05-23 PROCEDURE — 63600175 PHARM REV CODE 636 W HCPCS: Performed by: HOSPITALIST

## 2024-05-23 RX ORDER — DEXAMETHASONE 4 MG/1
4 TABLET ORAL DAILY
Status: DISCONTINUED | OUTPATIENT
Start: 2024-05-23 | End: 2024-05-23

## 2024-05-23 RX ORDER — POLYETHYLENE GLYCOL 3350 17 G/17G
17 POWDER, FOR SOLUTION ORAL DAILY
Status: DISCONTINUED | OUTPATIENT
Start: 2024-05-23 | End: 2024-05-29 | Stop reason: HOSPADM

## 2024-05-23 RX ORDER — OXYCODONE AND ACETAMINOPHEN 5; 325 MG/1; MG/1
1 TABLET ORAL 2 TIMES DAILY
Status: DISCONTINUED | OUTPATIENT
Start: 2024-05-23 | End: 2024-05-27

## 2024-05-23 RX ORDER — AMOXICILLIN 250 MG
1 CAPSULE ORAL DAILY
Status: DISCONTINUED | OUTPATIENT
Start: 2024-05-23 | End: 2024-05-29 | Stop reason: HOSPADM

## 2024-05-23 RX ADMIN — IPRATROPIUM BROMIDE AND ALBUTEROL SULFATE 3 ML: 2.5; .5 SOLUTION RESPIRATORY (INHALATION) at 07:05

## 2024-05-23 RX ADMIN — MECLIZINE 25 MG: 12.5 TABLET ORAL at 08:05

## 2024-05-23 RX ADMIN — MORPHINE SULFATE 2 MG: 4 INJECTION INTRAVENOUS at 02:05

## 2024-05-23 RX ADMIN — DEXAMETHASONE 5 MG: 4 TABLET ORAL at 09:05

## 2024-05-23 RX ADMIN — IPRATROPIUM BROMIDE AND ALBUTEROL SULFATE 3 ML: 2.5; .5 SOLUTION RESPIRATORY (INHALATION) at 03:05

## 2024-05-23 RX ADMIN — SODIUM CHLORIDE SOLN NEBU 3% 4 ML: 3 NEBU SOLN at 07:05

## 2024-05-23 RX ADMIN — GUAIFENESIN 1200 MG: 600 TABLET, EXTENDED RELEASE ORAL at 08:05

## 2024-05-23 RX ADMIN — BENZONATATE 100 MG: 100 CAPSULE ORAL at 08:05

## 2024-05-23 RX ADMIN — DEXAMETHASONE SODIUM PHOSPHATE 2 MG: 4 INJECTION INTRA-ARTICULAR; INTRALESIONAL; INTRAMUSCULAR; INTRAVENOUS; SOFT TISSUE at 08:05

## 2024-05-23 RX ADMIN — IPRATROPIUM BROMIDE AND ALBUTEROL SULFATE 3 ML: 2.5; .5 SOLUTION RESPIRATORY (INHALATION) at 11:05

## 2024-05-23 RX ADMIN — MORPHINE SULFATE 2 MG: 4 INJECTION INTRAVENOUS at 06:05

## 2024-05-23 RX ADMIN — SODIUM CHLORIDE SOLN NEBU 3% 4 ML: 3 NEBU SOLN at 03:05

## 2024-05-23 RX ADMIN — BENZONATATE 100 MG: 100 CAPSULE ORAL at 09:05

## 2024-05-23 RX ADMIN — OXYCODONE HYDROCHLORIDE AND ACETAMINOPHEN 1 TABLET: 5; 325 TABLET ORAL at 09:05

## 2024-05-23 RX ADMIN — SODIUM CHLORIDE SOLN NEBU 3% 4 ML: 3 NEBU SOLN at 12:05

## 2024-05-23 RX ADMIN — IPRATROPIUM BROMIDE AND ALBUTEROL SULFATE 3 ML: 2.5; .5 SOLUTION RESPIRATORY (INHALATION) at 04:05

## 2024-05-23 RX ADMIN — MECLIZINE 25 MG: 12.5 TABLET ORAL at 09:05

## 2024-05-23 RX ADMIN — BENZONATATE 100 MG: 100 CAPSULE ORAL at 03:05

## 2024-05-23 RX ADMIN — GUAIFENESIN 1200 MG: 600 TABLET, EXTENDED RELEASE ORAL at 09:05

## 2024-05-23 RX ADMIN — IPRATROPIUM BROMIDE AND ALBUTEROL SULFATE 3 ML: 2.5; .5 SOLUTION RESPIRATORY (INHALATION) at 12:05

## 2024-05-23 NOTE — PLAN OF CARE
Problem: Adult Inpatient Plan of Care  Goal: Plan of Care Review  Outcome: Progressing  Goal: Patient-Specific Goal (Individualized)  Outcome: Progressing  Goal: Absence of Hospital-Acquired Illness or Injury  Outcome: Progressing  Goal: Optimal Comfort and Wellbeing  Outcome: Progressing  Goal: Readiness for Transition of Care  Outcome: Progressing     Problem: COPD (Chronic Obstructive Pulmonary Disease)  Goal: Optimal Chronic Illness Coping  Outcome: Progressing  Goal: Optimal Level of Functional [place]  Outcome: Progressing  Goal: Absence of Infection Signs and Symptoms  Outcome: Progressing  Goal: Improved Oral Intake  Outcome: Progressing  Goal: Effective Oxygenation and Ventilation  Outcome: Progressing     Problem: Infection  Goal: Absence of Infection Signs and Symptoms  Outcome: Progressing     Problem: Coping Ineffective  Goal: Effective Coping  Outcome: Progressing     Problem: HIV/AIDS Infection  Goal: HIV/AIDS Symptom Control  Outcome: Progressing     Problem: Gas Exchange Impaired  Goal: Optimal Gas Exchange  Outcome: Progressing     Problem: Comorbidity Management  Goal: Blood Pressure in Desired Range  Outcome: Progressing     Problem: Electrolyte Imbalance  Goal: Electrolyte Balance  Outcome: Progressing     Problem: Oncology Care  Goal: Effective Coping  Outcome: Progressing  Goal: Improved Activity Tolerance  Outcome: Progressing  Goal: Optimal Oral Intake  Outcome: Progressing  Goal: Improved Oral Mucous Membrane Integrity  Outcome: Progressing  Goal: Optimal Pain Control and Function  Outcome: Progressing     Problem: Fall Injury Risk  Goal: Absence of Fall and Fall-Related Injury  Outcome: Progressing     Problem: Skin Injury Risk Increased  Goal: Skin Health and Integrity  Outcome: Progressing

## 2024-05-23 NOTE — PLAN OF CARE
Problem: Adult Inpatient Plan of Care  Goal: Plan of Care Review  Outcome: Progressing  Goal: Patient-Specific Goal (Individualized)  Outcome: Progressing  Goal: Absence of Hospital-Acquired Illness or Injury  Outcome: Progressing  Goal: Optimal Comfort and Wellbeing  Outcome: Progressing  Goal: Readiness for Transition of Care  Outcome: Progressing     Problem: COPD (Chronic Obstructive Pulmonary Disease)  Goal: Optimal Chronic Illness Coping  Outcome: Progressing  Goal: Optimal Level of Functional [place]  Outcome: Progressing  Goal: Absence of Infection Signs and Symptoms  Outcome: Progressing  Goal: Improved Oral Intake  Outcome: Progressing  Goal: Effective Oxygenation and Ventilation  Outcome: Progressing     Problem: Infection  Goal: Absence of Infection Signs and Symptoms  Outcome: Progressing     Problem: Coping Ineffective  Goal: Effective Coping  Outcome: Progressing     Problem: HIV/AIDS Infection  Goal: HIV/AIDS Symptom Control  Outcome: Progressing     Problem: Gas Exchange Impaired  Goal: Optimal Gas Exchange  Outcome: Progressing     Problem: Comorbidity Management  Goal: Blood Pressure in Desired Range  Outcome: Progressing     Problem: Electrolyte Imbalance  Goal: Electrolyte Balance  Outcome: Progressing     Problem: Oncology Care  Goal: Effective Coping  Outcome: Progressing  Goal: Improved Activity Tolerance  Outcome: Progressing  Goal: Optimal Oral Intake  Outcome: Progressing  Goal: Improved Oral Mucous Membrane Integrity  Outcome: Progressing  Goal: Optimal Pain Control and Function  Outcome: Progressing     Problem: Fall Injury Risk  Goal: Absence of Fall and Fall-Related Injury  Outcome: Progressing     Problem: Skin Injury Risk Increased  Goal: Skin Health and Integrity  Outcome: Progressing

## 2024-05-23 NOTE — PROGRESS NOTES
[facility] Hospital Medicine  Progress Note    Primary Team: [facility] HOSP MED O  Admit Date: 5/8/2024    Subjective:      HPI:  Oscar Waldron is a 64 y.o. male with a PMHx of cocaine abuse, HIV not on HAART, tobacco abuse, HTN who present to [facility] for evaluation of shortness of breath. Patient reports progressive worsening of shortness of breath over the past few weeks. He now has a  productive cough with associated left sided chest/ left upper abdomen pain which only occurs during coughing fits. Shortness of breath worsens while lying flat and with minimal exertion. No improvement with albuterol inhaler that he borrowed from a family member. Admits to smoking crack cocaine 2 days ago. Has >40 year hx of tobacco abuse. He stopped taking his HIV medications a few years ago.       ED: tachycardic to , improved without intervention. Satting 96% on 3L NC. BNP <10, trop 0.010. EKG with non-specifically TWAs. CXR with cardiomegaly with pulmonary vascular congestion and bilateral interstitial opacities, moderate left-sided pleural effusion with associated compressive atelectasis. Given solumedrol 125mg, azithro and rocephin.      Overview/Hospital Course:  Oscar Waldron is placed in  Observation for management of acute respiratory failure with hypoxia. Requiring supplemental oxygen on admit. CT chest reviewed, concern for malignant/metastatic process vs infectious process. Started on IV rocephin and azithromycin. Pulmonology and ID consulted. S/p thoracentesis for L pleural effusion 5/9 with 950 mL removed & showing exudative process. Infectious workup negative. Pleural fluid cytology unfortunately showing adenocarcinoma. Oncology consulted and have set an oncology appt on 5/22. ID is also setting him up in HOP clinic where he will need to present to start ART. Palliative Care following Repeat therapeutic thora done 5/15 with 1 L removed.  Repeat thora 5/17, 5/19. PleurX placed 5/20. Pt has elected to  do NH placement w/ hospice; awaiting placement.     Interval History:  Symptoms stable. Awaiting placement.     ROS:  As per HPI  General: no fever, no chills  Cardiovascular:  no orthopnea  Respiratory: + cough, shortness of breath, chest pain  All other systems reviewed & are negative.     No past medical history on file.  No past surgical history on file.      Objective:   Last 24 Hour Vital Signs:  BP  Min: 127/78  Max: 147/78  Temp  Av.1 °F (36.7 °C)  Min: 97.9 °F (36.6 °C)  Max: 98.4 °F (36.9 °C)  Pulse  Av.7  Min: 58  Max: 101  Resp  Av.7  Min: 17  Max: 20  SpO2  Av.2 %  Min: 91 %  Max: 97 %  Weight  Av.7 kg (149 lb 4 oz)  Min: 67.7 kg (149 lb 4 oz)  Max: 67.7 kg (149 lb 4 oz)  I/O last 3 completed shifts:  In: 280 [P.O.:280]  Out: 800 [Urine:800]    Physical Examination:  GEN: AAOx3, NAD  HEENT: NCAT, MMM, PERRL, EOMI, oropharynx clear  CV: RRR, no m/r, no S3/S4  RESP: CTAB, no wheezes/crackles, no increased WOB, on 2L NC  ABD: soft, NTND, normoactive BS, no organomegaly  EXTR: no c/c/e, intact distal pulses x 4  NEURO: PERRL, EOMI, moving all four extremities, intact sensation to light touch, no focal deficits  SKIN: no rashes, lesions, or color changes  PSYCH: normal affect    Laboratory:  I have reviewed all pertinent lab results/findings within the past 24 hours.    Radiology:  I have reviewed all pertinent imaging results/findings within the past 24 hours.    Current Medications:     Infusions:       Scheduled:   albuterol-ipratropium  3 mL Nebulization Q4H    benzonatate  100 mg Oral TID    dexAMETHasone  5 mg Oral Daily    guaiFENesin  1,200 mg Oral BID    meclizine  25 mg Oral BID    oxyCODONE-acetaminophen  1 tablet Oral BID    polyethylene glycol  17 g Oral Daily    senna-docusate 8.6-50 mg  1 tablet Oral Daily    sodium chloride 3%  4 mL Nebulization Q8H    sulfamethoxazole-trimethoprim 800-160mg  1 tablet Oral Every Mon, Wed, Fri        PRN:    Current  Facility-Administered Medications:     albuterol-ipratropium, 3 mL, Nebulization, Q6H PRN    bisacodyL, 10 mg, Rectal, Daily PRN    dextrose 10%, 12.5 g, Intravenous, PRN    dextrose 10%, 25 g, Intravenous, PRN    glucagon (human recombinant), 1 mg, Intramuscular, PRN    glucose, 16 g, Oral, PRN    glucose, 24 g, Oral, PRN    hydrOXYzine HCL, 10 mg, Oral, TID PRN    iohexol, 15 mL, Oral, PRN    melatonin, 6 mg, Oral, Nightly PRN    morphine, 2 mg, Intravenous, Q4H PRN    ondansetron, 8 mg, Oral, Q8H PRN    polyethylene glycol, 17 g, Oral, Daily PRN    promethazine, 25 mg, Oral, Q6H PRN    Prior records reviewed.       Assessment/Plan:     Oscar Waldron is a 64 y.o.male with    Acute respiratory failure with hypoxia  Abnormal CT of lung  Metastatic lung adenocarcinoma  Left pleural effusion  - CT chest reviewed:  1. Solid spiculated nodule in the left lung apex and irregular consolidation area in the lingula, worrisome for lung cancer.   2. Ground-glass nodule in the right middle lobe, may represent infectious/inflammatory process versus primary lung cancer.  Consider attention on follow-up studies.   3. Bilateral irregular pulmonary nodules, may be sequela of infectious/inflammatory process versus neoplastic nodules.  Consider attention on follow-up.   4. Ill-defined hypoattenuation of the inferior hepatic lobe and few lucent lesions in the vertebral body of L1, nonspecific but worrisome for metastatic process.   5. Left adrenal nodule.     - CT abdomen reviewed:   Few heterogeneously hypodense liver lesions suspicious for metastasis.   Indeterminate indistinct lucent lesions and L1 vertebral body and right intertrochanteric region.  Metastasis not excluded.  Further evaluation could be obtained with contrast enhanced MRI.     - received rocephin/azithro/prednisone; prn duonebs  - acapella and CPT ordered  - pulm & ID consulted  - thora 5/9 with 950 ml removed; pleural fluid studies suggestive of exudative process.  Repeat thora with 1 L removed; repeat CXR.  - sputum cx - normal respiratory av  - mTB PCR negative  - PJP PCP negative  - aspergillus antigen negative  - fungitell negative  - crypto antigen negative  - AFB smear negative x 3. Follow AFB cultures  - pleural fluid cultures negative  - pleural fluid cytology shows lung adenocarcinoma       - imaging suggestive of metastatic disease  - oncology consulted -  OP f/u on 5/22  - palliative care consulted - prn percocet ordered  - qualifies for home oxygen per 6 min walk test performed on 5/14; home oxygen order placed  - persistently hypoxic 5/16 - 78% on RA; repeat CXR - worsened  - Pulmonary revisit 5/17, 5/19 for thoracentesis  - Pleurx 5/20   - DNR, election to comfort care plan with hospice as of 5/20.  SW assisting.      Hypophosphatemia  - phos 1.5, replaced  - daily phos     High cholesterol  - not on statin  - lipid panel reviewed     HTN (hypertension)  - not on home meds  - controlled currently     Human immunodeficiency virus (HIV) disease  AIDS  - non-adherent to ART; off since 2020  - CD4 115  - ID consulted: starting Bactrim for ppx  - ID arranging HOP clinic follow up. I would prefer that the patient present to HOP clinic prior to restarting ART      Goals of care  Advance Care Planning     Date: 05/23/2024    Code Status  In light of the patients advanced and life limiting illness,I engaged the the patient in a voluntary conversation about the patient's preferences for care  at the very end of life. The patient wishes to have a natural, peaceful death.  Along those lines, the patient does not wish to have CPR or other invasive treatments performed when his heart and/or breathing stops. I communicated to the patient that a DNR order would be placed in his medical record to reflect this preference.  I spent a total of 10 minutes engaging the patient in this advance care planning discussion.           Gail Jacome MD  Hospital Medicine Staff  [facility]

## 2024-05-23 NOTE — PLAN OF CARE
11:43 AM NH placement is still pending with [facility]. SINDI sent pt's information to [facility] to assist with placement. Pt still has a few pending facilities and will continue ton f/u.     11:53 AM SINDI received a call from Vanesa with [facility] who stated that they do not have any beds at this time, but she will send it to her (3) sister facilities for review. SW will continue to f/u.     12:17 PM SW received notification from [facility] that info has been received.     2:36 PM W received a call from Vanesa with [facility] who informed that sister facilities will not accept the pt due to his PleurX in place. Sindi notified B[facility]  [facility]  Ext. [phone]

## 2024-05-24 PROCEDURE — 99900035 HC TECH TIME PER 15 MIN (STAT)

## 2024-05-24 PROCEDURE — 25000003 PHARM REV CODE 250: Performed by: HOSPITALIST

## 2024-05-24 PROCEDURE — 94761 N-INVAS EAR/PLS OXIMETRY MLT: CPT

## 2024-05-24 PROCEDURE — 25000242 PHARM REV CODE 250 ALT 637 W/ HCPCS: Performed by: HOSPITALIST

## 2024-05-24 PROCEDURE — 63600175 PHARM REV CODE 636 W HCPCS: Performed by: INTERNAL MEDICINE

## 2024-05-24 PROCEDURE — 25000003 PHARM REV CODE 250

## 2024-05-24 PROCEDURE — 27000221 HC OXYGEN, UP TO 24 HOURS

## 2024-05-24 PROCEDURE — 25000003 PHARM REV CODE 250: Performed by: INTERNAL MEDICINE

## 2024-05-24 PROCEDURE — 63600175 PHARM REV CODE 636 W HCPCS: Performed by: HOSPITALIST

## 2024-05-24 PROCEDURE — 94640 AIRWAY INHALATION TREATMENT: CPT

## 2024-05-24 PROCEDURE — 25000242 PHARM REV CODE 250 ALT 637 W/ HCPCS: Performed by: INTERNAL MEDICINE

## 2024-05-24 PROCEDURE — 11000001 HC ACUTE MED/SURG PRIVATE ROOM

## 2024-05-24 RX ADMIN — GUAIFENESIN 1200 MG: 600 TABLET, EXTENDED RELEASE ORAL at 08:05

## 2024-05-24 RX ADMIN — IPRATROPIUM BROMIDE AND ALBUTEROL SULFATE 3 ML: 2.5; .5 SOLUTION RESPIRATORY (INHALATION) at 03:05

## 2024-05-24 RX ADMIN — MECLIZINE 25 MG: 12.5 TABLET ORAL at 08:05

## 2024-05-24 RX ADMIN — OXYCODONE HYDROCHLORIDE AND ACETAMINOPHEN 1 TABLET: 5; 325 TABLET ORAL at 08:05

## 2024-05-24 RX ADMIN — DEXAMETHASONE 5 MG: 4 TABLET ORAL at 08:05

## 2024-05-24 RX ADMIN — SODIUM CHLORIDE SOLN NEBU 3% 4 ML: 3 NEBU SOLN at 07:05

## 2024-05-24 RX ADMIN — SODIUM CHLORIDE SOLN NEBU 3% 4 ML: 3 NEBU SOLN at 12:05

## 2024-05-24 RX ADMIN — BENZONATATE 100 MG: 100 CAPSULE ORAL at 08:05

## 2024-05-24 RX ADMIN — POLYETHYLENE GLYCOL 3350 17 G: 17 POWDER, FOR SOLUTION ORAL at 08:05

## 2024-05-24 RX ADMIN — SULFAMETHOXAZOLE AND TRIMETHOPRIM 1 TABLET: 800; 160 TABLET ORAL at 04:05

## 2024-05-24 RX ADMIN — IPRATROPIUM BROMIDE AND ALBUTEROL SULFATE 3 ML: 2.5; .5 SOLUTION RESPIRATORY (INHALATION) at 07:05

## 2024-05-24 RX ADMIN — IPRATROPIUM BROMIDE AND ALBUTEROL SULFATE 3 ML: 2.5; .5 SOLUTION RESPIRATORY (INHALATION) at 12:05

## 2024-05-24 RX ADMIN — MORPHINE SULFATE 2 MG: 4 INJECTION INTRAVENOUS at 10:05

## 2024-05-24 RX ADMIN — BENZONATATE 100 MG: 100 CAPSULE ORAL at 04:05

## 2024-05-24 RX ADMIN — SENNOSIDES AND DOCUSATE SODIUM 1 TABLET: 50; 8.6 TABLET ORAL at 08:05

## 2024-05-24 RX ADMIN — IPRATROPIUM BROMIDE AND ALBUTEROL SULFATE 3 ML: 2.5; .5 SOLUTION RESPIRATORY (INHALATION) at 11:05

## 2024-05-24 RX ADMIN — SODIUM CHLORIDE SOLN NEBU 3% 4 ML: 3 NEBU SOLN at 03:05

## 2024-05-24 NOTE — PLAN OF CARE
Problem: Adult Inpatient Plan of Care  Goal: Plan of Care Review  Outcome: Progressing  Goal: Patient-Specific Goal (Individualized)  Outcome: Progressing  Goal: Absence of Hospital-Acquired Illness or Injury  Outcome: Progressing  Goal: Optimal Comfort and Wellbeing  Outcome: Progressing  Goal: Readiness for Transition of Care  Outcome: Progressing     Problem: COPD (Chronic Obstructive Pulmonary Disease)  Goal: Optimal Chronic Illness Coping  Outcome: Progressing  Goal: Optimal Level of Functional [place]  Outcome: Progressing  Goal: Absence of Infection Signs and Symptoms  Outcome: Progressing  Goal: Improved Oral Intake  Outcome: Progressing  Goal: Effective Oxygenation and Ventilation  Outcome: Progressing     Problem: Infection  Goal: Absence of Infection Signs and Symptoms  Outcome: Progressing     Problem: Coping Ineffective  Goal: Effective Coping  Outcome: Progressing     Problem: HIV/AIDS Infection  Goal: HIV/AIDS Symptom Control  Outcome: Progressing     Problem: Gas Exchange Impaired  Goal: Optimal Gas Exchange  Outcome: Progressing     Problem: Comorbidity Management  Goal: Blood Pressure in Desired Range  Outcome: Progressing     Problem: Electrolyte Imbalance  Goal: Electrolyte Balance  Outcome: Progressing     Problem: Oncology Care  Goal: Effective Coping  Outcome: Progressing  Goal: Improved Activity Tolerance  Outcome: Progressing  Goal: Optimal Oral Intake  Outcome: Progressing  Goal: Improved Oral Mucous Membrane Integrity  Outcome: Progressing  Goal: Optimal Pain Control and Function  Outcome: Progressing     Problem: Fall Injury Risk  Goal: Absence of Fall and Fall-Related Injury  Outcome: Progressing     Problem: Skin Injury Risk Increased  Goal: Skin Health and Integrity  Outcome: Progressing

## 2024-05-24 NOTE — CARE UPDATE
"RAPID RESPONSE NURSE PROACTIVE ROUNDING NOTE       Time of Visit:     Admit Date: 2024  LOS: 15  Code Status: DNR   Date of Visit: 2024  : 1960  Age: 64 y.o.  Sex: male  Race: Black or   Bed: 1126/1126 A:   MRN: [id number]  Was the patient discharged from an ICU this admission? No   Was the patient discharged from a PACU within last 24 hours? No   Did the patient receive conscious sedation/general anesthesia in last 24 hours? No  Was the patient in the ED within the past 24 hours? No  Was the patient on NIPPV within the past 24 hours? No   Attending Physician: Gail Jacome MD  Primary Service: [facility] [place] HOSP MED O   Time spent at the bedside: 15 -30 min    SITUATION    Notified by bedside RN via phone call.  Called to evaluate the patient for  assistance in draining Pleur X tube.     BACKGROUND     Why is the patient in the hospital?: Adenocarcinoma, lung    Patient has no past medical history on file.    Last Vitals:  Temp: 98 °F (36.7 °C) ( 1153)  Pulse: 103 ( 1541)  Resp: 20 ( 1541)  BP: 151/96 ( 1153)  SpO2: 95 % ( 1541)    24 Hours Vitals Range:  Temp:  [97.7 °F (36.5 °C)-98.6 °F (37 °C)]   Pulse:  []   Resp:  [17-20]   BP: (136-151)/(62-96)   SpO2:  [92 %-96 %]     ASSESSMENT    Physical Exam  Vitals and nursing note reviewed.   Constitutional:       Appearance: Normal appearance.   Pulmonary:      Effort: Pulmonary effort is normal.   Neurological:      Mental Status: He is alert and oriented to person, place, and time.         INTERVENTIONS    RRN called to bedside to assist in draining patient's Pleur X tube. Pt c/o of SOB and needing to "drain fluid off."   Sterile technique and dressing change maintained per hospital protocol.     Educated pt and daughter on importance of sterile technique throughout process and how to change the dressing when patient discharges home.    500 ml dark red drainage emptied from Pleur X. Pt reports relief in his " SOB.     RECOMMENDATIONS    Reinforce education to patient and daughter on process of draining and maintaining sterility when changing dressing once discharged home.     PROVIDER ESCALATION    Yes/No  No    Orders received and case discussed with NA.    Disposition: Remain in room 1126.    FOLLOW-UP    Bedside RN, Ena updated on plan of care. Instructed to call the Rapid Response Nurse, Annemarie Lara RN at [phone] for additional questions or concerns.

## 2024-05-24 NOTE — NURSING
Pt and daughter educated on Pleural tube  drainage process using sterile process. Verbalized understanding, denies further questions/concerns

## 2024-05-24 NOTE — PLAN OF CARE
Problem: Adult Inpatient Plan of Care  Goal: Plan of Care Review  Outcome: Progressing  Goal: Patient-Specific Goal (Individualized)  Outcome: Progressing  Goal: Absence of Hospital-Acquired Illness or Injury  Outcome: Progressing  Goal: Optimal Comfort and Wellbeing  Outcome: Progressing  Goal: Readiness for Transition of Care  Outcome: Progressing     Problem: COPD (Chronic Obstructive Pulmonary Disease)  Goal: Optimal Chronic Illness Coping  Outcome: Progressing  Goal: Optimal Level of Functional [place]  Outcome: Progressing  Goal: Absence of Infection Signs and Symptoms  Outcome: Progressing  Goal: Improved Oral Intake  Outcome: Progressing  Goal: Effective Oxygenation and Ventilation  Outcome: Progressing     Problem: Infection  Goal: Absence of Infection Signs and Symptoms  Outcome: Progressing     Problem: Coping Ineffective  Goal: Effective Coping  Outcome: Progressing     Problem: HIV/AIDS Infection  Goal: HIV/AIDS Symptom Control  Outcome: Progressing     Problem: Gas Exchange Impaired  Goal: Optimal Gas Exchange  Outcome: Progressing     Problem: Comorbidity Management  Goal: Blood Pressure in Desired Range  Outcome: Progressing     Problem: Electrolyte Imbalance  Goal: Electrolyte Balance  Outcome: Progressing     Problem: Oncology Care  Goal: Effective Coping  Outcome: Progressing  Goal: Improved Activity Tolerance  Outcome: Progressing  Goal: Optimal Oral Intake  Outcome: Progressing  Goal: Improved Oral Mucous Membrane Integrity  Outcome: Progressing  Goal: Optimal Pain Control and Function  Outcome: Progressing     Problem: Fall Injury Risk  Goal: Absence of Fall and Fall-Related Injury  Outcome: Progressing     Problem: Skin Injury Risk Increased  Goal: Skin Health and Integrity  Outcome: Progressing

## 2024-05-24 NOTE — PLAN OF CARE
9:20 AM NH placement is still pending with [facility]. Pt still has a few pending facilities, left VM to return call. Avoidable days documented. SW will continue ton f/u.       10:38 AM SW who was accompanied by MD spoke to pt at the bedside to provide update on placement. SW informed pt that we are still unable to secure placement. SW informed pt that his only other option may be home with home hospice. SW asked pt if he would be able to go home with his daughter if her previous issue have been resolved. Pt phoned daughter and she stated that she will be at the bedside around noon. SW will re-visit pt around noon and follow up.     1:35 PM SW went to re-visit pt at the bedside to see if daughter was available. Daughter never came. Pt phoned daughter again and she stated that she was in route. CM will continue to f/u.     3:02 PM SW went to re-visit pt at the bedside to see if daughter was available. Daughter never came. SW attempted to contact daughter, left VM to return call.CM will continue to f/u.     Holly Moser, BECKY  [facility]  Ext. [phone]

## 2024-05-24 NOTE — PROGRESS NOTES
[facility] Hospital Medicine  Progress Note    Primary Team: [facility]OS[facility] MED O  Admit Date: 5/8/2024    Subjective:      HPI:  Oscar Waldron is a 64 y.o. male with a PMHx of cocaine abuse, HIV not on HAART, tobacco abuse, HTN who present to [facility] for evaluation of shortness of breath. Patient reports progressive worsening of shortness of breath over the past few weeks. He now has a  productive cough with associated left sided chest/ left upper abdomen pain which only occurs during coughing fits. Shortness of breath worsens while lying flat and with minimal exertion. No improvement with albuterol inhaler that he borrowed from a family member. Admits to smoking crack cocaine 2 days ago. Has >40 year hx of tobacco abuse. He stopped taking his HIV medications a few years ago.       ED: tachycardic to , improved without intervention. Satting 96% on 3L NC. BNP <10, trop 0.010. EKG with non-specifically TWAs. CXR with cardiomegaly with pulmonary vascular congestion and bilateral interstitial opacities, moderate left-sided pleural effusion with associated compressive atelectasis. Given solumedrol 125mg, azithro and rocephin.      Overview/Hospital Course:  Oscar Waldron is placed in  Observation for management of acute respiratory failure with hypoxia. Requiring supplemental oxygen on admit. CT chest reviewed, concern for malignant/metastatic process vs infectious process. Started on IV rocephin and azithromycin. Pulmonology and ID consulted. S/p thoracentesis for L pleural effusion 5/9 with 950 mL removed & showing exudative process. Infectious workup negative. Pleural fluid cytology unfortunately showing adenocarcinoma. Oncology consulted and have set an oncology appt on 5/22. ID is also setting him up in HOP clinic where he will need to present to start ART. Palliative Care following Repeat therapeutic thora done 5/15 with 1 L removed.  Repeat thora 5/17, 5/19. PleurX placed 5/20. Pt has elected to  do NH placement w/ hospice; awaiting placement.     Interval History:  Symptoms stable. Trying to reach daughter in regards to discharging pt home with her.     ROS:  As per HPI  General: no fever, no chills  Cardiovascular:  no orthopnea  Respiratory: + cough, shortness of breath, chest pain  All other systems reviewed & are negative.     No past medical history on file.  No past surgical history on file.      Objective:   Last 24 Hour Vital Signs:  BP  Min: 136/62  Max: 155/87  Temp  Av.1 °F (36.7 °C)  Min: 97.7 °F (36.5 °C)  Max: 98.6 °F (37 °C)  Pulse  Av.5  Min: 64  Max: 103  Resp  Av.2  Min: 17  Max: 20  SpO2  Av.6 %  Min: 92 %  Max: 97 %  Weight  Av kg (149 lb 14.6 oz)  Min: 68 kg (149 lb 14.6 oz)  Max: 68 kg (149 lb 14.6 oz)  I/O last 3 completed shifts:  In: 100 [P.O.:100]  Out: 800 [Urine:800]    Physical Examination:  GEN: AAOx3, NAD  HEENT: NCAT, MMM, PERRL, EOMI, oropharynx clear  CV: RRR, no m/r, no S3/S4  RESP: CTAB, no wheezes/crackles, no increased WOB, on 2L NC  ABD: soft, NTND, normoactive BS, no organomegaly  EXTR: no c/c/e, intact distal pulses x 4  NEURO: PERRL, EOMI, moving all four extremities, intact sensation to light touch, no focal deficits  SKIN: no rashes, lesions, or color changes  PSYCH: normal affect    Laboratory:  I have reviewed all pertinent lab results/findings within the past 24 hours.    Radiology:  I have reviewed all pertinent imaging results/findings within the past 24 hours.    Current Medications:     Infusions:       Scheduled:   albuterol-ipratropium  3 mL Nebulization Q4H    benzonatate  100 mg Oral TID    dexAMETHasone  5 mg Oral Daily    guaiFENesin  1,200 mg Oral BID    meclizine  25 mg Oral BID    oxyCODONE-acetaminophen  1 tablet Oral BID    polyethylene glycol  17 g Oral Daily    senna-docusate 8.6-50 mg  1 tablet Oral Daily    sodium chloride 3%  4 mL Nebulization Q8H    sulfamethoxazole-trimethoprim 800-160mg  1 tablet Oral Every Mon,  Wed, Fri        PRN:    Current Facility-Administered Medications:     albuterol-ipratropium, 3 mL, Nebulization, Q6H PRN    bisacodyL, 10 mg, Rectal, Daily PRN    dextrose 10%, 12.5 g, Intravenous, PRN    dextrose 10%, 25 g, Intravenous, PRN    glucagon (human recombinant), 1 mg, Intramuscular, PRN    glucose, 16 g, Oral, PRN    glucose, 24 g, Oral, PRN    hydrOXYzine HCL, 10 mg, Oral, TID PRN    iohexol, 15 mL, Oral, PRN    melatonin, 6 mg, Oral, Nightly PRN    morphine, 2 mg, Intravenous, Q4H PRN    ondansetron, 8 mg, Oral, Q8H PRN    polyethylene glycol, 17 g, Oral, Daily PRN    promethazine, 25 mg, Oral, Q6H PRN    Prior records reviewed.       Assessment/Plan:     Oscar Waldron is a 64 y.o.male with    Acute respiratory failure with hypoxia  Abnormal CT of lung  Metastatic lung adenocarcinoma  Left pleural effusion  - CT chest reviewed:  1. Solid spiculated nodule in the left lung apex and irregular consolidation area in the lingula, worrisome for lung cancer.   2. Ground-glass nodule in the right middle lobe, may represent infectious/inflammatory process versus primary lung cancer.  Consider attention on follow-up studies.   3. Bilateral irregular pulmonary nodules, may be sequela of infectious/inflammatory process versus neoplastic nodules.  Consider attention on follow-up.   4. Ill-defined hypoattenuation of the inferior hepatic lobe and few lucent lesions in the vertebral body of L1, nonspecific but worrisome for metastatic process.   5. Left adrenal nodule.     - CT abdomen reviewed:   Few heterogeneously hypodense liver lesions suspicious for metastasis.   Indeterminate indistinct lucent lesions and L1 vertebral body and right intertrochanteric region.  Metastasis not excluded.  Further evaluation could be obtained with contrast enhanced MRI.     - received rocephin/azithro/prednisone; prn duonebs  - acapella and CPT ordered  - pulm & ID consulted  - thora 5/9 with 950 ml removed; pleural fluid studies  suggestive of exudative process. Repeat thora with 1 L removed; repeat CXR.  - sputum cx - normal respiratory av  - mTB PCR negative  - PJP PCP negative  - aspergillus antigen negative  - fungitell negative  - crypto antigen negative  - AFB smear negative x 3. Follow AFB cultures  - pleural fluid cultures negative  - pleural fluid cytology shows lung adenocarcinoma       - imaging suggestive of metastatic disease  - oncology consulted -  OP f/u on 5/22  - palliative care consulted - prn percocet ordered  - qualifies for home oxygen per 6 min walk test performed on 5/14; home oxygen order placed  - persistently hypoxic 5/16 - 78% on RA; repeat CXR - worsened  - Pulmonary revisit 5/17, 5/19 for thoracentesis  - Pleurx 5/20   - DNR, election to comfort care plan with hospice as of 5/20.  SW assisting.      Hypophosphatemia  - phos 1.5, replaced  - daily phos     High cholesterol  - not on statin  - lipid panel reviewed     HTN (hypertension)  - not on home meds  - controlled currently     Human immunodeficiency virus (HIV) disease  AIDS  - non-adherent to ART; off since 2020  - CD4 115  - ID consulted: starting Bactrim for ppx  - ID arranging HOP clinic follow up. I would prefer that the patient present to HOP clinic prior to restarting ART      Goals of care  Advance Care Planning     Date: 05/23/2024    Code Status  In light of the patients advanced and life limiting illness,I engaged the the patient in a voluntary conversation about the patient's preferences for care  at the very end of life. The patient wishes to have a natural, peaceful death.  Along those lines, the patient does not wish to have CPR or other invasive treatments performed when his heart and/or breathing stops. I communicated to the patient that a DNR order would be placed in his medical record to reflect this preference.  I spent a total of 10 minutes engaging the patient in this advance care planning discussion.           Gail Jacome,  MD  [facility] Medicine Staff  [facility]

## 2024-05-25 PROCEDURE — 94664 DEMO&/EVAL PT USE INHALER: CPT

## 2024-05-25 PROCEDURE — 27000221 HC OXYGEN, UP TO 24 HOURS

## 2024-05-25 PROCEDURE — 25000003 PHARM REV CODE 250: Performed by: HOSPITALIST

## 2024-05-25 PROCEDURE — 63600175 PHARM REV CODE 636 W HCPCS: Performed by: HOSPITALIST

## 2024-05-25 PROCEDURE — 25000242 PHARM REV CODE 250 ALT 637 W/ HCPCS: Performed by: HOSPITALIST

## 2024-05-25 PROCEDURE — 99900035 HC TECH TIME PER 15 MIN (STAT)

## 2024-05-25 PROCEDURE — 25000003 PHARM REV CODE 250

## 2024-05-25 PROCEDURE — 11000001 HC ACUTE MED/SURG PRIVATE ROOM

## 2024-05-25 PROCEDURE — 94668 MNPJ CHEST WALL SBSQ: CPT

## 2024-05-25 PROCEDURE — 94640 AIRWAY INHALATION TREATMENT: CPT

## 2024-05-25 PROCEDURE — 94761 N-INVAS EAR/PLS OXIMETRY MLT: CPT

## 2024-05-25 PROCEDURE — 25000003 PHARM REV CODE 250: Performed by: INTERNAL MEDICINE

## 2024-05-25 PROCEDURE — 63600175 PHARM REV CODE 636 W HCPCS: Performed by: INTERNAL MEDICINE

## 2024-05-25 PROCEDURE — 25000242 PHARM REV CODE 250 ALT 637 W/ HCPCS: Performed by: INTERNAL MEDICINE

## 2024-05-25 RX ORDER — HYDROXYZINE HYDROCHLORIDE 10 MG/1
10 TABLET, FILM COATED ORAL 3 TIMES DAILY PRN
Status: DISCONTINUED | OUTPATIENT
Start: 2024-05-25 | End: 2024-05-29 | Stop reason: HOSPADM

## 2024-05-25 RX ADMIN — BENZONATATE 100 MG: 100 CAPSULE ORAL at 08:05

## 2024-05-25 RX ADMIN — DEXAMETHASONE 5 MG: 4 TABLET ORAL at 09:05

## 2024-05-25 RX ADMIN — IPRATROPIUM BROMIDE AND ALBUTEROL SULFATE 3 ML: 2.5; .5 SOLUTION RESPIRATORY (INHALATION) at 04:05

## 2024-05-25 RX ADMIN — OXYCODONE HYDROCHLORIDE AND ACETAMINOPHEN 1 TABLET: 5; 325 TABLET ORAL at 08:05

## 2024-05-25 RX ADMIN — BENZONATATE 100 MG: 100 CAPSULE ORAL at 03:05

## 2024-05-25 RX ADMIN — IPRATROPIUM BROMIDE AND ALBUTEROL SULFATE 3 ML: 2.5; .5 SOLUTION RESPIRATORY (INHALATION) at 05:05

## 2024-05-25 RX ADMIN — SODIUM CHLORIDE SOLN NEBU 3% 4 ML: 3 NEBU SOLN at 07:05

## 2024-05-25 RX ADMIN — GUAIFENESIN 1200 MG: 600 TABLET, EXTENDED RELEASE ORAL at 08:05

## 2024-05-25 RX ADMIN — MORPHINE SULFATE 2 MG: 4 INJECTION INTRAVENOUS at 09:05

## 2024-05-25 RX ADMIN — GUAIFENESIN 1200 MG: 600 TABLET, EXTENDED RELEASE ORAL at 09:05

## 2024-05-25 RX ADMIN — MECLIZINE 25 MG: 12.5 TABLET ORAL at 08:05

## 2024-05-25 RX ADMIN — POLYETHYLENE GLYCOL 3350 17 G: 17 POWDER, FOR SOLUTION ORAL at 09:05

## 2024-05-25 RX ADMIN — OXYCODONE HYDROCHLORIDE AND ACETAMINOPHEN 1 TABLET: 5; 325 TABLET ORAL at 09:05

## 2024-05-25 RX ADMIN — MECLIZINE 25 MG: 12.5 TABLET ORAL at 09:05

## 2024-05-25 RX ADMIN — SODIUM CHLORIDE SOLN NEBU 3% 4 ML: 3 NEBU SOLN at 04:05

## 2024-05-25 RX ADMIN — IPRATROPIUM BROMIDE AND ALBUTEROL SULFATE 3 ML: 2.5; .5 SOLUTION RESPIRATORY (INHALATION) at 11:05

## 2024-05-25 RX ADMIN — IPRATROPIUM BROMIDE AND ALBUTEROL SULFATE 3 ML: 2.5; .5 SOLUTION RESPIRATORY (INHALATION) at 12:05

## 2024-05-25 RX ADMIN — IPRATROPIUM BROMIDE AND ALBUTEROL SULFATE 3 ML: 2.5; .5 SOLUTION RESPIRATORY (INHALATION) at 08:05

## 2024-05-25 RX ADMIN — SENNOSIDES AND DOCUSATE SODIUM 1 TABLET: 50; 8.6 TABLET ORAL at 09:05

## 2024-05-25 RX ADMIN — SODIUM CHLORIDE SOLN NEBU 3% 4 ML: 3 NEBU SOLN at 12:05

## 2024-05-25 RX ADMIN — IPRATROPIUM BROMIDE AND ALBUTEROL SULFATE 3 ML: 2.5; .5 SOLUTION RESPIRATORY (INHALATION) at 07:05

## 2024-05-25 RX ADMIN — MORPHINE SULFATE 2 MG: 4 INJECTION INTRAVENOUS at 10:05

## 2024-05-25 RX ADMIN — BENZONATATE 100 MG: 100 CAPSULE ORAL at 09:05

## 2024-05-25 NOTE — PROGRESS NOTES
[facility] Hospital Medicine  Progress Note    Primary Team: [facility] HOSP MED O  Admit Date: 5/8/2024    Subjective:      HPI:  Oscar Waldron is a 64 y.o. male with a PMHx of cocaine abuse, HIV not on HAART, tobacco abuse, HTN who present to [facility] for evaluation of shortness of breath. Patient reports progressive worsening of shortness of breath over the past few weeks. He now has a  productive cough with associated left sided chest/ left upper abdomen pain which only occurs during coughing fits. Shortness of breath worsens while lying flat and with minimal exertion. No improvement with albuterol inhaler that he borrowed from a family member. Admits to smoking crack cocaine 2 days ago. Has >40 year hx of tobacco abuse. He stopped taking his HIV medications a few years ago.       ED: tachycardic to , improved without intervention. Satting 96% on 3L NC. BNP <10, trop 0.010. EKG with non-specifically TWAs. CXR with cardiomegaly with pulmonary vascular congestion and bilateral interstitial opacities, moderate left-sided pleural effusion with associated compressive atelectasis. Given solumedrol 125mg, azithro and rocephin.      Overview/Hospital Course:  Oscar Waldron is placed in  Observation for management of acute respiratory failure with hypoxia. Requiring supplemental oxygen on admit. CT chest reviewed, concern for malignant/metastatic process vs infectious process. Started on IV rocephin and azithromycin. Pulmonology and ID consulted. S/p thoracentesis for L pleural effusion 5/9 with 950 mL removed & showing exudative process. Infectious workup negative. Pleural fluid cytology unfortunately showing adenocarcinoma. Oncology consulted and have set an oncology appt on 5/22. ID is also setting him up in HOP clinic where he will need to present to start ART. Palliative Care following Repeat therapeutic thora done 5/15 with 1 L removed.  Repeat thora 5/17, 5/19. PleurX placed 5/20. Attempted NH  placement w/ hospice but facilities unable to meet needs. Planning for home hospice.     Interval History:  Pt and family received PleurX education yesterday. Had 500 mL drained at bedside yesterday. Still feels like he has fluid today with SOB; RN will assist with further drainage today. NH placement has been unsuccessful; planning for d/c home with hospice to daughters home after she has returned from out of town.     ROS:  As per HPI  General: no fever, no chills  Cardiovascular:  no orthopnea  Respiratory: + cough, shortness of breath, chest pain  All other systems reviewed & are negative.     No past medical history on file.  No past surgical history on file.      Objective:   Last 24 Hour Vital Signs:  BP  Min: 122/75  Max: 172/93  Temp  Av.9 °F (36.6 °C)  Min: 97.6 °F (36.4 °C)  Max: 98.2 °F (36.8 °C)  Pulse  Av.6  Min: 82  Max: 103  Resp  Av.2  Min: 16  Max: 20  SpO2  Av.7 %  Min: 92 %  Max: 96 %  I/O last 3 completed shifts:  In: -   Out: 900 [Urine:400; Other:500]    Physical Examination:  GEN: AAOx3, NAD  HEENT: NCAT, MMM, PERRL, EOMI, oropharynx clear  CV: RRR, no m/r, no S3/S4  RESP: CTAB, no wheezes/crackles, no increased WOB, on 2L NC, PleurX catheter in place  ABD: soft, NTND, normoactive BS, no organomegaly  EXTR: no c/c/e, intact distal pulses x 4  NEURO: PERRL, EOMI, moving all four extremities, intact sensation to light touch, no focal deficits  SKIN: no rashes, lesions, or color changes  PSYCH: normal affect    Laboratory:  I have reviewed all pertinent lab results/findings within the past 24 hours.    Radiology:  I have reviewed all pertinent imaging results/findings within the past 24 hours.    Current Medications:     Infusions:       Scheduled:   albuterol-ipratropium  3 mL Nebulization Q4H    benzonatate  100 mg Oral TID    dexAMETHasone  5 mg Oral Daily    guaiFENesin  1,200 mg Oral BID    meclizine  25 mg Oral BID    oxyCODONE-acetaminophen  1 tablet Oral BID     polyethylene glycol  17 g Oral Daily    senna-docusate 8.6-50 mg  1 tablet Oral Daily    sodium chloride 3%  4 mL Nebulization Q8H    sulfamethoxazole-trimethoprim 800-160mg  1 tablet Oral Every Mon, Wed, Fri        PRN:    Current Facility-Administered Medications:     albuterol-ipratropium, 3 mL, Nebulization, Q6H PRN    bisacodyL, 10 mg, Rectal, Daily PRN    dextrose 10%, 12.5 g, Intravenous, PRN    dextrose 10%, 25 g, Intravenous, PRN    glucagon (human recombinant), 1 mg, Intramuscular, PRN    glucose, 16 g, Oral, PRN    glucose, 24 g, Oral, PRN    hydrOXYzine HCL, 10 mg, Oral, TID PRN    iohexol, 15 mL, Oral, PRN    melatonin, 6 mg, Oral, Nightly PRN    morphine, 2 mg, Intravenous, Q4H PRN    ondansetron, 8 mg, Oral, Q8H PRN    polyethylene glycol, 17 g, Oral, Daily PRN    promethazine, 25 mg, Oral, Q6H PRN    Prior records reviewed.       Assessment/Plan:     Oscar Waldron is a 64 y.o.male with    Acute respiratory failure with hypoxia  Abnormal CT of lung  Metastatic lung adenocarcinoma  Left pleural effusion  - CT chest reviewed:  1. Solid spiculated nodule in the left lung apex and irregular consolidation area in the lingula, worrisome for lung cancer.   2. Ground-glass nodule in the right middle lobe, may represent infectious/inflammatory process versus primary lung cancer.  Consider attention on follow-up studies.   3. Bilateral irregular pulmonary nodules, may be sequela of infectious/inflammatory process versus neoplastic nodules.  Consider attention on follow-up.   4. Ill-defined hypoattenuation of the inferior hepatic lobe and few lucent lesions in the vertebral body of L1, nonspecific but worrisome for metastatic process.   5. Left adrenal nodule.     - CT abdomen reviewed:   Few heterogeneously hypodense liver lesions suspicious for metastasis.   Indeterminate indistinct lucent lesions and L1 vertebral body and right intertrochanteric region.  Metastasis not excluded.  Further evaluation could be  obtained with contrast enhanced MRI.     - received rocephin/azithro/prednisone; prn duonebs  - acapella and CPT ordered  - pulm & ID consulted  - thora 5/9 with 950 ml removed; pleural fluid studies suggestive of exudative process. Repeat thora with 1 L removed; repeat CXR.  - sputum cx - normal respiratory av  - mTB PCR negative  - PJP PCP negative  - aspergillus antigen negative  - fungitell negative  - crypto antigen negative  - AFB smear negative x 3. Follow AFB cultures  - pleural fluid cultures negative  - pleural fluid cytology shows lung adenocarcinoma       - imaging suggestive of metastatic disease  - oncology consulted -  OP f/u on 5/22  - palliative care consulted - prn percocet ordered  - qualifies for home oxygen per 6 min walk test performed on 5/14; home oxygen order placed  - persistently hypoxic 5/16 - 78% on RA; repeat CXR - worsened  - Pulmonary revisit 5/17, 5/19 for thoracentesis  - Pleurx 5/20   - DNR, election to comfort care plan with hospice as of 5/20.  SW assisting.      Hypophosphatemia  - phos 1.5, replaced  - daily phos     High cholesterol  - not on statin  - lipid panel reviewed     HTN (hypertension)  - not on home meds  - controlled currently     Human immunodeficiency virus (HIV) disease  AIDS  - non-adherent to ART; off since 2020  - CD4 115  - ID consulted: starting Bactrim for ppx  - ID arranging HOP clinic follow up. I would prefer that the patient present to HOP clinic prior to restarting ART      Goals of care  Advance Care Planning     Date: 05/23/2024    Code Status  In light of the patients advanced and life limiting illness,I engaged the the patient in a voluntary conversation about the patient's preferences for care  at the very end of life. The patient wishes to have a natural, peaceful death.  Along those lines, the patient does not wish to have CPR or other invasive treatments performed when his heart and/or breathing stops. I communicated to the patient that a  DNR order would be placed in his medical record to reflect this preference.  I spent a total of 10 minutes engaging the patient in this advance care planning discussion.           Gail Jacome MD  [facility] Medicine Staff  [facility]

## 2024-05-25 NOTE — PLAN OF CARE
Problem: Adult Inpatient Plan of Care  Goal: Plan of Care Review  Outcome: Progressing     Problem: Adult Inpatient Plan of Care  Goal: Absence of Hospital-Acquired Illness or Injury  Outcome: Progressing     Problem: Adult Inpatient Plan of Care  Goal: Optimal Comfort and Wellbeing  Outcome: Progressing     Problem: Adult Inpatient Plan of Care  Goal: Readiness for Transition of Care  Outcome: Progressing     Problem: COPD (Chronic Obstructive Pulmonary Disease)  Goal: Optimal Chronic Illness Coping  Outcome: Progressing     Problem: COPD (Chronic Obstructive Pulmonary Disease)  Goal: Optimal Level of Functional [place]  Outcome: Progressing     Problem: COPD (Chronic Obstructive Pulmonary Disease)  Goal: Absence of Infection Signs and Symptoms  Outcome: Progressing     Problem: COPD (Chronic Obstructive Pulmonary Disease)  Goal: Improved Oral Intake  Outcome: Progressing

## 2024-05-25 NOTE — PLAN OF CARE
Problem: Adult Inpatient Plan of Care  Goal: Plan of Care Review  Outcome: Progressing  Goal: Patient-Specific Goal (Individualized)  Outcome: Progressing  Goal: Absence of Hospital-Acquired Illness or Injury  Outcome: Progressing  Goal: Optimal Comfort and Wellbeing  Outcome: Progressing  Goal: Readiness for Transition of Care  Outcome: Progressing     Problem: COPD (Chronic Obstructive Pulmonary Disease)  Goal: Optimal Chronic Illness Coping  Outcome: Progressing  Goal: Optimal Level of Functional [place]  Outcome: Progressing  Goal: Absence of Infection Signs and Symptoms  Outcome: Progressing  Goal: Improved Oral Intake  Outcome: Progressing  Goal: Effective Oxygenation and Ventilation  Outcome: Progressing     Problem: Infection  Goal: Absence of Infection Signs and Symptoms  Outcome: Progressing     Problem: Coping Ineffective  Goal: Effective Coping  Outcome: Progressing     Problem: HIV/AIDS Infection  Goal: HIV/AIDS Symptom Control  Outcome: Progressing     Problem: Gas Exchange Impaired  Goal: Optimal Gas Exchange  Outcome: Progressing     Problem: Comorbidity Management  Goal: Blood Pressure in Desired Range  Outcome: Progressing     Problem: Electrolyte Imbalance  Goal: Electrolyte Balance  Outcome: Progressing     Problem: Oncology Care  Goal: Effective Coping  Outcome: Progressing  Goal: Improved Activity Tolerance  Outcome: Progressing  Goal: Optimal Oral Intake  Outcome: Progressing  Goal: Improved Oral Mucous Membrane Integrity  Outcome: Progressing  Goal: Optimal Pain Control and Function  Outcome: Progressing     Problem: Fall Injury Risk  Goal: Absence of Fall and Fall-Related Injury  Outcome: Progressing     Problem: Skin Injury Risk Increased  Goal: Skin Health and Integrity  Outcome: Progressing

## 2024-05-26 PROCEDURE — 94640 AIRWAY INHALATION TREATMENT: CPT

## 2024-05-26 PROCEDURE — 94668 MNPJ CHEST WALL SBSQ: CPT

## 2024-05-26 PROCEDURE — 25000003 PHARM REV CODE 250: Performed by: HOSPITALIST

## 2024-05-26 PROCEDURE — 11000001 HC ACUTE MED/SURG PRIVATE ROOM

## 2024-05-26 PROCEDURE — 25000003 PHARM REV CODE 250: Performed by: INTERNAL MEDICINE

## 2024-05-26 PROCEDURE — 25000242 PHARM REV CODE 250 ALT 637 W/ HCPCS

## 2024-05-26 PROCEDURE — 27000221 HC OXYGEN, UP TO 24 HOURS

## 2024-05-26 PROCEDURE — 94761 N-INVAS EAR/PLS OXIMETRY MLT: CPT

## 2024-05-26 PROCEDURE — 25000242 PHARM REV CODE 250 ALT 637 W/ HCPCS: Performed by: HOSPITALIST

## 2024-05-26 PROCEDURE — 94664 DEMO&/EVAL PT USE INHALER: CPT

## 2024-05-26 PROCEDURE — 25000003 PHARM REV CODE 250

## 2024-05-26 PROCEDURE — 99900035 HC TECH TIME PER 15 MIN (STAT)

## 2024-05-26 PROCEDURE — 25000242 PHARM REV CODE 250 ALT 637 W/ HCPCS: Performed by: INTERNAL MEDICINE

## 2024-05-26 PROCEDURE — 63600175 PHARM REV CODE 636 W HCPCS: Performed by: HOSPITALIST

## 2024-05-26 RX ADMIN — SENNOSIDES AND DOCUSATE SODIUM 1 TABLET: 50; 8.6 TABLET ORAL at 08:05

## 2024-05-26 RX ADMIN — MECLIZINE 25 MG: 12.5 TABLET ORAL at 09:05

## 2024-05-26 RX ADMIN — BENZONATATE 100 MG: 100 CAPSULE ORAL at 03:05

## 2024-05-26 RX ADMIN — HYDROXYZINE HYDROCHLORIDE 10 MG: 10 TABLET ORAL at 07:05

## 2024-05-26 RX ADMIN — GUAIFENESIN 1200 MG: 600 TABLET, EXTENDED RELEASE ORAL at 09:05

## 2024-05-26 RX ADMIN — MECLIZINE 25 MG: 12.5 TABLET ORAL at 08:05

## 2024-05-26 RX ADMIN — SODIUM CHLORIDE SOLN NEBU 3% 4 ML: 3 NEBU SOLN at 06:05

## 2024-05-26 RX ADMIN — BENZONATATE 100 MG: 100 CAPSULE ORAL at 08:05

## 2024-05-26 RX ADMIN — POLYETHYLENE GLYCOL 3350 17 G: 17 POWDER, FOR SOLUTION ORAL at 08:05

## 2024-05-26 RX ADMIN — OXYCODONE HYDROCHLORIDE AND ACETAMINOPHEN 1 TABLET: 5; 325 TABLET ORAL at 09:05

## 2024-05-26 RX ADMIN — IPRATROPIUM BROMIDE AND ALBUTEROL SULFATE 3 ML: 2.5; .5 SOLUTION RESPIRATORY (INHALATION) at 04:05

## 2024-05-26 RX ADMIN — IPRATROPIUM BROMIDE AND ALBUTEROL SULFATE 3 ML: 2.5; .5 SOLUTION RESPIRATORY (INHALATION) at 06:05

## 2024-05-26 RX ADMIN — IPRATROPIUM BROMIDE AND ALBUTEROL SULFATE 3 ML: 2.5; .5 SOLUTION RESPIRATORY (INHALATION) at 12:05

## 2024-05-26 RX ADMIN — IPRATROPIUM BROMIDE AND ALBUTEROL SULFATE 3 ML: 2.5; .5 SOLUTION RESPIRATORY (INHALATION) at 09:05

## 2024-05-26 RX ADMIN — SODIUM CHLORIDE SOLN NEBU 3% 4 ML: 3 NEBU SOLN at 12:05

## 2024-05-26 RX ADMIN — OXYCODONE HYDROCHLORIDE AND ACETAMINOPHEN 1 TABLET: 5; 325 TABLET ORAL at 08:05

## 2024-05-26 RX ADMIN — BENZONATATE 100 MG: 100 CAPSULE ORAL at 09:05

## 2024-05-26 RX ADMIN — IPRATROPIUM BROMIDE AND ALBUTEROL SULFATE 3 ML: 2.5; .5 SOLUTION RESPIRATORY (INHALATION) at 01:05

## 2024-05-26 RX ADMIN — DEXAMETHASONE 5 MG: 4 TABLET ORAL at 08:05

## 2024-05-26 RX ADMIN — GUAIFENESIN 1200 MG: 600 TABLET, EXTENDED RELEASE ORAL at 08:05

## 2024-05-26 NOTE — PLAN OF CARE
Update at 5:10 PM  Dr. Jacome will complete [employer] form tomorrow morning. CM staff will fax completed form and any necessary documents.     CM received message from attending MD, plan to discharge home with hospice 5/27/24. Plan is home with hospice, will have pleurx cath. Searched website for ordering replacement kits ([employer]) for pleurx to deliver home until accepting hospice admits. Spoke with Waleska at [facility], confirmed staff available to manage pleurx. Will send referral to [facility]. Provided Waleska with patient details. Hospice orders requested from care team.      Vanesa Bailey RN  Weekend  - [facility]  Spectralink: [phone]

## 2024-05-26 NOTE — PLAN OF CARE
[facility]  Department of Hospital Medicine  [address] [place] [address]  [phone] after hours  [phone] fax    HOSPICE  ORDERS    05/28/2024    Admit to Hospice:  Nursing Home with Hospice  Diagnoses:   Active Hospital Problems    Diagnosis  POA    *Malignant pleural effusion [J91.0]  Yes    Adenocarcinoma, lung [C34.90]  Yes    Palliative care encounter [Z51.5]  Not Applicable    Hypodense mass of liver [R16.0]  Yes    Hypophosphatemia [E83.39]  No    Acute respiratory failure with hypoxia [J96.01]  Yes    Pleural effusion, left [J90]  Yes    HTN (hypertension) [I10]  Yes    High cholesterol [E78.00]  Yes    AIDS (acquired immunodeficiency syndrome), CD4 <=200 [B20]  Yes     dx update  Formatting of this note might be different from the original.   dx update        Resolved Hospital Problems   No resolved problems to display.       Hospice Qualifying Diagnoses: Metastatic adenocarcinoma, malignant pleural effusion       Patient has a life expectancy < 6 months due to:  Primary Hospice Diagnosis:  metastatic adenocarcinoma  Comorbid Conditions Contributing to Decline: malignant pleural effusion, HIV AIDS    Vital Signs: Routine per Hospice Protocol.    Code Status: DNR    Allergies:   Review of patient's allergies indicates:   Allergen Reactions    Shellfish containing products Anaphylaxis     Allergic to all fish       Diet: regular    Activities: As tolerated    Goals of Care Treatment Preferences:  Code Status: DNR      Nursing: Per Hospice Routine.          Oxygen: 2-3 L continuous for goal sats > 95%    Other Miscellaneous Care:   PleurX drain care, per nursing.  Drain pleurX at least 3x/week, up to 1000 mL at a time.       Medication List        START taking these medications      acetaminophen 500 MG tablet  Commonly known as: TYLENOL  Take 2 tablets (1,000 mg total) by mouth 3 (three) times daily.     albuterol-ipratropium 2.5 mg-0.5 mg/3 mL nebulizer  solution  Commonly known as: DUO-NEB  Take 3 mLs (1 vial) by nebulization every 6 (six) hours as needed for Wheezing. Rescue     benzonatate 100 MG capsule  Commonly known as: TESSALON  Take 1 capsule (100 mg total) by mouth 3 (three) times daily as needed for Cough.     LIDOcaine 5 %  Commonly known as: LIDODERM  Place 2 patches onto the skin once daily. To chest wall. Remove & Discard patch within 12 hours or as directed by MD.     meclizine 25 mg tablet  Commonly known as: ANTIVERT  Take 1 tablet (25 mg total) by mouth 3 (three) times daily as needed for Dizziness.     melatonin 3 mg tablet  Commonly known as: MELATIN  Take 2 tablets (6 mg total) by mouth nightly.     oxyCODONE 5 MG immediate release tablet  Commonly known as: ROXICODONE  Take 1 tablet (5 mg total) by mouth every 4 (four) hours as needed (severe pain).     polyethylene glycol 17 gram Pwpk  Commonly known as: GLYCOLAX  Take 17 g by mouth 2 (two) times daily.     senna-docusate 8.6-50 mg 8.6-50 mg per tablet  Commonly known as: SENNA WITH DOCUSATE SODIUM  Take 1 tablet by mouth 2 (two) times a day.     sulfamethoxazole-trimethoprim 800-160mg 800-160 mg Tab  Commonly known as: BACTRIM DS  Take 1 tablet by mouth every Mon, Wed, Fri.                    Future Orders:  Hospice Medical Director may dictate new orders for comfortable care measures & sign death certificate.        _________________________________  Gail Jacome MD  05/28/2024

## 2024-05-26 NOTE — PLAN OF CARE
Problem: Adult Inpatient Plan of Care  Goal: Plan of Care Review  Outcome: Progressing  Goal: Patient-Specific Goal (Individualized)  Outcome: Progressing  Goal: Absence of Hospital-Acquired Illness or Injury  Outcome: Progressing  Goal: Optimal Comfort and Wellbeing  Outcome: Progressing  Goal: Readiness for Transition of Care  Outcome: Progressing     Problem: Oncology Care  Goal: Effective Coping  Outcome: Progressing  Goal: Improved Activity Tolerance  Outcome: Progressing  Goal: Optimal Oral Intake  Outcome: Progressing  Goal: Improved Oral Mucous Membrane Integrity  Outcome: Progressing  Goal: Optimal Pain Control and Function  Outcome: Progressing     Problem: Fall Injury Risk  Goal: Absence of Fall and Fall-Related Injury  Outcome: Progressing

## 2024-05-26 NOTE — PROGRESS NOTES
[facility] Hospital Medicine  Progress Note    Primary Team: [facility] HOSP MED O  Admit Date: 5/8/2024    Subjective:      HPI:  Oscar Waldron is a 64 y.o. male with a PMHx of cocaine abuse, HIV not on HAART, tobacco abuse, HTN who present to [facility] for evaluation of shortness of breath. Patient reports progressive worsening of shortness of breath over the past few weeks. He now has a  productive cough with associated left sided chest/ left upper abdomen pain which only occurs during coughing fits. Shortness of breath worsens while lying flat and with minimal exertion. No improvement with albuterol inhaler that he borrowed from a family member. Admits to smoking crack cocaine 2 days ago. Has >40 year hx of tobacco abuse. He stopped taking his HIV medications a few years ago.       ED: tachycardic to , improved without intervention. Satting 96% on 3L NC. BNP <10, trop 0.010. EKG with non-specifically TWAs. CXR with cardiomegaly with pulmonary vascular congestion and bilateral interstitial opacities, moderate left-sided pleural effusion with associated compressive atelectasis. Given solumedrol 125mg, azithro and rocephin.      Overview/Hospital Course:  Oscar Waldron is placed in  Observation for management of acute respiratory failure with hypoxia. Requiring supplemental oxygen on admit. CT chest reviewed, concern for malignant/metastatic process vs infectious process. Started on IV rocephin and azithromycin. Pulmonology and ID consulted. S/p thoracentesis for L pleural effusion 5/9 with 950 mL removed & showing exudative process. Infectious workup negative. Pleural fluid cytology unfortunately showing adenocarcinoma. Oncology consulted and have set an oncology appt on 5/22. ID is also setting him up in HOP clinic where he will need to present to start ART. Palliative Care following Repeat therapeutic thora done 5/15 with 1 L removed.  Repeat thora 5/17, 5/19. PleurX placed 5/20. Attempted NH  placement w/ hospice but facilities unable to meet needs. Planning for home hospice.     Interval History:  No AEON or new complaints. Awaiting bottles from central supply for additional pleurx drainage. Anticipate d/c home with hospice tomorrow after daughter returns to [facility].     ROS:  As per HPI  General: no fever, no chills  Cardiovascular:  no orthopnea  Respiratory: + cough, shortness of breath, chest pain  All other systems reviewed & are negative.     No past medical history on file.  No past surgical history on file.      Objective:   Last 24 Hour Vital Signs:  BP  Min: 128/76  Max: 140/81  Temp  Av.1 °F (36.7 °C)  Min: 97.6 °F (36.4 °C)  Max: 98.4 °F (36.9 °C)  Pulse  Av.1  Min: 71  Max: 95  Resp  Av.9  Min: 16  Max: 19  SpO2  Av.3 %  Min: 92 %  Max: 97 %  Weight  Av.6 kg (153 lb 7 oz)  Min: 69.6 kg (153 lb 7 oz)  Max: 69.6 kg (153 lb 7 oz)  I/O last 3 completed shifts:  In: 240 [P.O.:240]  Out: 500 [Urine:500]    Physical Examination:  GEN: AAOx3, NAD  HEENT: NCAT, MMM, PERRL, EOMI, oropharynx clear  CV: RRR, no m/r, no S3/S4  RESP: CTAB, no wheezes/crackles, no increased WOB, on 2L NC, PleurX catheter in place  ABD: soft, NTND, normoactive BS, no organomegaly  EXTR: no c/c/e, intact distal pulses x 4  NEURO: PERRL, EOMI, moving all four extremities, intact sensation to light touch, no focal deficits  SKIN: no rashes, lesions, or color changes  PSYCH: normal affect    Laboratory:  I have reviewed all pertinent lab results/findings within the past 24 hours.    Radiology:  I have reviewed all pertinent imaging results/findings within the past 24 hours.    Current Medications:     Infusions:       Scheduled:   albuterol-ipratropium  3 mL Nebulization Q4H    benzonatate  100 mg Oral TID    dexAMETHasone  5 mg Oral Daily    guaiFENesin  1,200 mg Oral BID    meclizine  25 mg Oral BID    oxyCODONE-acetaminophen  1 tablet Oral BID    polyethylene glycol  17 g Oral Daily    senna-docusate  8.6-50 mg  1 tablet Oral Daily    sodium chloride 3%  4 mL Nebulization Q8H    sulfamethoxazole-trimethoprim 800-160mg  1 tablet Oral Every Mon, Wed, Fri        PRN:    Current Facility-Administered Medications:     albuterol-ipratropium, 3 mL, Nebulization, Q6H PRN    bisacodyL, 10 mg, Rectal, Daily PRN    dextrose 10%, 12.5 g, Intravenous, PRN    dextrose 10%, 25 g, Intravenous, PRN    glucagon (human recombinant), 1 mg, Intramuscular, PRN    glucose, 16 g, Oral, PRN    glucose, 24 g, Oral, PRN    hydrOXYzine HCL, 10 mg, Oral, TID PRN    iohexol, 15 mL, Oral, PRN    melatonin, 6 mg, Oral, Nightly PRN    morphine, 2 mg, Intravenous, Q4H PRN    ondansetron, 8 mg, Oral, Q8H PRN    polyethylene glycol, 17 g, Oral, Daily PRN    promethazine, 25 mg, Oral, Q6H PRN    Prior records reviewed.       Assessment/Plan:     Oscar Waldron is a 64 y.o.male with    Acute respiratory failure with hypoxia  Abnormal CT of lung  Metastatic lung adenocarcinoma  Left pleural effusion  - CT chest reviewed:  1. Solid spiculated nodule in the left lung apex and irregular consolidation area in the lingula, worrisome for lung cancer.   2. Ground-glass nodule in the right middle lobe, may represent infectious/inflammatory process versus primary lung cancer.  Consider attention on follow-up studies.   3. Bilateral irregular pulmonary nodules, may be sequela of infectious/inflammatory process versus neoplastic nodules.  Consider attention on follow-up.   4. Ill-defined hypoattenuation of the inferior hepatic lobe and few lucent lesions in the vertebral body of L1, nonspecific but worrisome for metastatic process.   5. Left adrenal nodule.     - CT abdomen reviewed:   Few heterogeneously hypodense liver lesions suspicious for metastasis.   Indeterminate indistinct lucent lesions and L1 vertebral body and right intertrochanteric region.  Metastasis not excluded.  Further evaluation could be obtained with contrast enhanced MRI.     - received  rocephin/azithro/prednisone; prn duonebs  - acapella and CPT ordered  - pulm & ID consulted  - thora 5/9 with 950 ml removed; pleural fluid studies suggestive of exudative process. Repeat thora with 1 L removed; repeat CXR.  - sputum cx - normal respiratory av  - mTB PCR negative  - PJP PCP negative  - aspergillus antigen negative  - fungitell negative  - crypto antigen negative  - AFB smear negative x 3. Follow AFB cultures  - pleural fluid cultures negative  - pleural fluid cytology shows lung adenocarcinoma       - imaging suggestive of metastatic disease  - oncology consulted -  OP f/u on 5/22  - palliative care consulted - prn percocet ordered  - qualifies for home oxygen per 6 min walk test performed on 5/14; home oxygen order placed  - persistently hypoxic 5/16 - 78% on RA; repeat CXR - worsened  - Pulmonary revisit 5/17, 5/19 for thoracentesis  - Pleurx 5/20   - DNR, election to comfort care plan with hospice as of 5/20.  SW assisting.      Hypophosphatemia  - phos 1.5, replaced  - daily phos     High cholesterol  - not on statin  - lipid panel reviewed     HTN (hypertension)  - not on home meds  - controlled currently     Human immunodeficiency virus (HIV) disease  AIDS  - non-adherent to ART; off since 2020  - CD4 115  - ID consulted: starting Bactrim for ppx  - ID arranging HOP clinic follow up. I would prefer that the patient present to HOP clinic prior to restarting ART      Goals of care  Advance Care Planning     Date: 05/23/2024    Code Status  In light of the patients advanced and life limiting illness,I engaged the the patient in a voluntary conversation about the patient's preferences for care  at the very end of life. The patient wishes to have a natural, peaceful death.  Along those lines, the patient does not wish to have CPR or other invasive treatments performed when his heart and/or breathing stops. I communicated to the patient that a DNR order would be placed in his medical record to  reflect this preference.  I spent a total of 10 minutes engaging the patient in this advance care planning discussion.           Gail Jacome MD  [facility] Medicine Staff  [facility]

## 2024-05-27 PROCEDURE — 27000221 HC OXYGEN, UP TO 24 HOURS

## 2024-05-27 PROCEDURE — 63600175 PHARM REV CODE 636 W HCPCS: Performed by: HOSPITALIST

## 2024-05-27 PROCEDURE — 25000003 PHARM REV CODE 250: Performed by: INTERNAL MEDICINE

## 2024-05-27 PROCEDURE — 94761 N-INVAS EAR/PLS OXIMETRY MLT: CPT

## 2024-05-27 PROCEDURE — 99900035 HC TECH TIME PER 15 MIN (STAT)

## 2024-05-27 PROCEDURE — 25000003 PHARM REV CODE 250: Performed by: HOSPITALIST

## 2024-05-27 PROCEDURE — 25000242 PHARM REV CODE 250 ALT 637 W/ HCPCS

## 2024-05-27 PROCEDURE — 25000003 PHARM REV CODE 250

## 2024-05-27 PROCEDURE — 94640 AIRWAY INHALATION TREATMENT: CPT

## 2024-05-27 PROCEDURE — 11000001 HC ACUTE MED/SURG PRIVATE ROOM

## 2024-05-27 RX ORDER — AMOXICILLIN 250 MG
1 CAPSULE ORAL 2 TIMES DAILY PRN
Qty: 60 TABLET | Refills: 11 | Status: SHIPPED | OUTPATIENT
Start: 2024-05-27 | End: 2024-05-28 | Stop reason: HOSPADM

## 2024-05-27 RX ORDER — BENZONATATE 100 MG/1
100 CAPSULE ORAL 3 TIMES DAILY PRN
Qty: 90 CAPSULE | Refills: 11 | Status: SHIPPED | OUTPATIENT
Start: 2024-05-27 | End: 2025-05-22

## 2024-05-27 RX ORDER — ACETAMINOPHEN 500 MG
1000 TABLET ORAL 3 TIMES DAILY
Status: DISCONTINUED | OUTPATIENT
Start: 2024-05-27 | End: 2024-05-29 | Stop reason: HOSPADM

## 2024-05-27 RX ORDER — POLYETHYLENE GLYCOL 3350 17 G/17G
17 POWDER, FOR SOLUTION ORAL 2 TIMES DAILY PRN
Qty: 1020 G | Refills: 11 | Status: SHIPPED | OUTPATIENT
Start: 2024-05-27 | End: 2024-05-28 | Stop reason: HOSPADM

## 2024-05-27 RX ORDER — LIDOCAINE 50 MG/G
3 PATCH TOPICAL
Status: DISCONTINUED | OUTPATIENT
Start: 2024-05-27 | End: 2024-05-29 | Stop reason: HOSPADM

## 2024-05-27 RX ORDER — OXYCODONE HYDROCHLORIDE 5 MG/1
5 TABLET ORAL EVERY 4 HOURS PRN
Status: DISCONTINUED | OUTPATIENT
Start: 2024-05-27 | End: 2024-05-29 | Stop reason: HOSPADM

## 2024-05-27 RX ADMIN — OXYCODONE HYDROCHLORIDE AND ACETAMINOPHEN 1 TABLET: 5; 325 TABLET ORAL at 09:05

## 2024-05-27 RX ADMIN — DEXAMETHASONE 5 MG: 4 TABLET ORAL at 09:05

## 2024-05-27 RX ADMIN — SULFAMETHOXAZOLE AND TRIMETHOPRIM 1 TABLET: 800; 160 TABLET ORAL at 05:05

## 2024-05-27 RX ADMIN — BENZONATATE 100 MG: 100 CAPSULE ORAL at 08:05

## 2024-05-27 RX ADMIN — MECLIZINE 25 MG: 12.5 TABLET ORAL at 10:05

## 2024-05-27 RX ADMIN — BENZONATATE 100 MG: 100 CAPSULE ORAL at 10:05

## 2024-05-27 RX ADMIN — ACETAMINOPHEN 1000 MG: 500 TABLET ORAL at 10:05

## 2024-05-27 RX ADMIN — MECLIZINE 25 MG: 12.5 TABLET ORAL at 08:05

## 2024-05-27 RX ADMIN — OXYCODONE 5 MG: 5 TABLET ORAL at 08:05

## 2024-05-27 RX ADMIN — POLYETHYLENE GLYCOL 3350 17 G: 17 POWDER, FOR SOLUTION ORAL at 10:05

## 2024-05-27 RX ADMIN — SENNOSIDES AND DOCUSATE SODIUM 1 TABLET: 50; 8.6 TABLET ORAL at 10:05

## 2024-05-27 RX ADMIN — LIDOCAINE 3 PATCH: 700 PATCH TOPICAL at 10:05

## 2024-05-27 RX ADMIN — GUAIFENESIN 1200 MG: 600 TABLET, EXTENDED RELEASE ORAL at 08:05

## 2024-05-27 RX ADMIN — GUAIFENESIN 1200 MG: 600 TABLET, EXTENDED RELEASE ORAL at 10:05

## 2024-05-27 RX ADMIN — IPRATROPIUM BROMIDE AND ALBUTEROL SULFATE 3 ML: 2.5; .5 SOLUTION RESPIRATORY (INHALATION) at 05:05

## 2024-05-27 RX ADMIN — BENZONATATE 100 MG: 100 CAPSULE ORAL at 03:05

## 2024-05-27 RX ADMIN — ACETAMINOPHEN 1000 MG: 500 TABLET ORAL at 03:05

## 2024-05-27 RX ADMIN — IPRATROPIUM BROMIDE AND ALBUTEROL SULFATE 3 ML: 2.5; .5 SOLUTION RESPIRATORY (INHALATION) at 04:05

## 2024-05-27 NOTE — PLAN OF CARE
SINDI received a message from Benjamin Almazan that the only accepting facility there is to offer is [facility] in . SINDI spoke to patient, patient's daughter and his sister and they have all confirmed that; that will be best for the patient's care. Daughter stated that she will be at the bedside around 11 am.     SINDI faxed all required documents to Benjamin for review. SINDI notified MD and will continue to follow up.     BECKY Valdovinos  [facility]  Ext. [phone]

## 2024-05-27 NOTE — NURSING
Performed via sterile technique ,patient left fingers not totally extended , difficulty with gloves and dressing change   . Had patient to verbally inform me of each next steps .   1) wash hands total of 1 min , with warm water dry then he instructed me on the next steps . Informed / educated do not touch other items if cap or any items drop discard , cannot use .Vebalized an understanding to all , total 950 cc reddish liquid , Tolerated fairly well , intermittent coughing throughout .

## 2024-05-27 NOTE — PLAN OF CARE
Problem: Adult Inpatient Plan of Care  Goal: Plan of Care Review  Outcome: Progressing  Goal: Patient-Specific Goal (Individualized)  Outcome: Progressing  Goal: Absence of Hospital-Acquired Illness or Injury  Outcome: Progressing  Goal: Optimal Comfort and Wellbeing  Outcome: Progressing  Goal: Readiness for Transition of Care  Outcome: Progressing     Problem: COPD (Chronic Obstructive Pulmonary Disease)  Goal: Optimal Chronic Illness Coping  Outcome: Progressing  Goal: Optimal Level of Functional [place]  Outcome: Progressing  Goal: Absence of Infection Signs and Symptoms  Outcome: Progressing  Goal: Improved Oral Intake  Outcome: Progressing  Goal: Effective Oxygenation and Ventilation  Outcome: Progressing     Problem: Infection  Goal: Absence of Infection Signs and Symptoms  Outcome: Progressing     Problem: Coping Ineffective  Goal: Effective Coping  Outcome: Progressing     Problem: Gas Exchange Impaired  Goal: Optimal Gas Exchange  Outcome: Progressing     Problem: Comorbidity Management  Goal: Blood Pressure in Desired Range  Outcome: Progressing     Problem: Electrolyte Imbalance  Goal: Electrolyte Balance  Outcome: Progressing     Problem: Oncology Care  Goal: Effective Coping  Outcome: Progressing     Problem: Fall Injury Risk  Goal: Absence of Fall and Fall-Related Injury  Outcome: Progressing     Problem: Skin Injury Risk Increased  Goal: Skin Health and Integrity  Outcome: Progressing

## 2024-05-27 NOTE — PROGRESS NOTES
[facility] Hospital Medicine  Progress Note    Primary Team: [facility] HOSP MED O  Admit Date: 5/8/2024    Subjective:      HPI:  Oscar Waldron is a 64 y.o. male with a PMHx of cocaine abuse, HIV not on HAART, tobacco abuse, HTN who present to [facility] for evaluation of shortness of breath. Patient reports progressive worsening of shortness of breath over the past few weeks. He now has a  productive cough with associated left sided chest/ left upper abdomen pain which only occurs during coughing fits. Shortness of breath worsens while lying flat and with minimal exertion. No improvement with albuterol inhaler that he borrowed from a family member. Admits to smoking crack cocaine 2 days ago. Has >40 year hx of tobacco abuse. He stopped taking his HIV medications a few years ago.       ED: tachycardic to , improved without intervention. Satting 96% on 3L NC. BNP <10, trop 0.010. EKG with non-specifically TWAs. CXR with cardiomegaly with pulmonary vascular congestion and bilateral interstitial opacities, moderate left-sided pleural effusion with associated compressive atelectasis. Given solumedrol 125mg, azithro and rocephin.      Overview/Hospital Course:  Oscar Waldron is placed in  Observation for management of acute respiratory failure with hypoxia. Requiring supplemental oxygen on admit. CT chest reviewed, concern for malignant/metastatic process vs infectious process. Started on IV rocephin and azithromycin. Pulmonology and ID consulted. S/p thoracentesis for L pleural effusion 5/9 with 950 mL removed & showing exudative process. Infectious workup negative. Pleural fluid cytology unfortunately showing adenocarcinoma. Oncology consulted and have set an oncology appt on 5/22. ID is also setting him up in HOP clinic where he will need to present to start ART. Palliative Care following Repeat therapeutic thora done 5/15 with 1 L removed.  Repeat thora 5/17, 5/19. PleurX placed 5/20. Attempted NH  placement w/ hospice but facilities unable to meet needs. Planning for home hospice.     Interval History:  Upper chest wall pain, 6/10 today, associated with SOB and cough. Adding multimodal regimen. RN to have drain pleurx today. Planning for NH with hospice placement at facility in .     ROS:  As per HPI  General: no fever, no chills  Cardiovascular:  no orthopnea  Respiratory: + cough, shortness of breath, chest pain  All other systems reviewed & are negative.     No past medical history on file.  No past surgical history on file.      Objective:   Last 24 Hour Vital Signs:  BP  Min: 131/85  Max: 154/74  Temp  Av °F (36.7 °C)  Min: 97.7 °F (36.5 °C)  Max: 98.4 °F (36.9 °C)  Pulse  Av.1  Min: 82  Max: 103  Resp  Av.8  Min: 16  Max: 22  SpO2  Av.9 %  Min: 93 %  Max: 99 %  I/O last 3 completed shifts:  In: 716 [P.O.:716]  Out: 1775 [Urine:1300; Other:475]    Physical Examination:  GEN: AAOx3, NAD  HEENT: NCAT, MMM, PERRL, EOMI, oropharynx clear  CV: RRR, no m/r, no S3/S4  RESP: CTAB, no wheezes/crackles, no increased WOB, on 3L NC, PleurX catheter in place  ABD: soft, NTND, normoactive BS, no organomegaly  EXTR: no c/c/e, intact distal pulses x 4  NEURO: PERRL, EOMI, moving all four extremities, intact sensation to light touch, no focal deficits  SKIN: no rashes, lesions, or color changes  PSYCH: normal affect    Laboratory:  I have reviewed all pertinent lab results/findings within the past 24 hours.    Radiology:  I have reviewed all pertinent imaging results/findings within the past 24 hours.    Current Medications:     Infusions:       Scheduled:   acetaminophen  1,000 mg Oral TID    benzonatate  100 mg Oral TID    dexAMETHasone  5 mg Oral Daily    guaiFENesin  1,200 mg Oral BID    LIDOcaine  3 patch Transdermal Q24H    meclizine  25 mg Oral BID    polyethylene glycol  17 g Oral Daily    senna-docusate 8.6-50 mg  1 tablet Oral Daily    sulfamethoxazole-trimethoprim 800-160mg  1 tablet Oral  Every Mon, Wed, Fri        PRN:    Current Facility-Administered Medications:     albuterol-ipratropium, 3 mL, Nebulization, Q6H PRN    bisacodyL, 10 mg, Rectal, Daily PRN    dextrose 10%, 12.5 g, Intravenous, PRN    dextrose 10%, 25 g, Intravenous, PRN    glucagon (human recombinant), 1 mg, Intramuscular, PRN    glucose, 16 g, Oral, PRN    glucose, 24 g, Oral, PRN    hydrOXYzine HCL, 10 mg, Oral, TID PRN    iohexol, 15 mL, Oral, PRN    melatonin, 6 mg, Oral, Nightly PRN    ondansetron, 8 mg, Oral, Q8H PRN    oxyCODONE, 5 mg, Oral, Q4H PRN    polyethylene glycol, 17 g, Oral, Daily PRN    promethazine, 25 mg, Oral, Q6H PRN    Prior records reviewed.       Assessment/Plan:     Oscar Waldron is a 64 y.o.male with    Acute respiratory failure with hypoxia  Abnormal CT of lung  Metastatic lung adenocarcinoma  Left pleural effusion  - CT chest reviewed:  1. Solid spiculated nodule in the left lung apex and irregular consolidation area in the lingula, worrisome for lung cancer.   2. Ground-glass nodule in the right middle lobe, may represent infectious/inflammatory process versus primary lung cancer.  Consider attention on follow-up studies.   3. Bilateral irregular pulmonary nodules, may be sequela of infectious/inflammatory process versus neoplastic nodules.  Consider attention on follow-up.   4. Ill-defined hypoattenuation of the inferior hepatic lobe and few lucent lesions in the vertebral body of L1, nonspecific but worrisome for metastatic process.   5. Left adrenal nodule.     - CT abdomen reviewed:   Few heterogeneously hypodense liver lesions suspicious for metastasis.   Indeterminate indistinct lucent lesions and L1 vertebral body and right intertrochanteric region.  Metastasis not excluded.  Further evaluation could be obtained with contrast enhanced MRI.     - received rocephin/azithro/prednisone; prn duonebs  - acapella and CPT ordered  - pulm & ID consulted  - thora 5/9 with 950 ml removed; pleural fluid  studies suggestive of exudative process. Repeat thora with 1 L removed; repeat CXR.  - sputum cx - normal respiratory av  - mTB PCR negative  - PJP PCP negative  - aspergillus antigen negative  - fungitell negative  - crypto antigen negative  - AFB smear negative x 3. Follow AFB cultures  - pleural fluid cultures negative  - pleural fluid cytology shows lung adenocarcinoma       - imaging suggestive of metastatic disease  - oncology consulted -  OP f/u on 5/22  - palliative care consulted - prn percocet ordered  - qualifies for home oxygen per 6 min walk test performed on 5/14; home oxygen order placed  - persistently hypoxic 5/16 - 78% on RA; repeat CXR - worsened  - Pulmonary revisit 5/17, 5/19 for thoracentesis  - Pleurx 5/20   - DNR, election to comfort care plan with hospice as of 5/20.  SW assisting.      Hypophosphatemia  - phos 1.5, replaced  - daily phos     High cholesterol  - not on statin  - lipid panel reviewed     HTN (hypertension)  - not on home meds  - controlled currently     Human immunodeficiency virus (HIV) disease  AIDS  - non-adherent to ART; off since 2020  - CD4 115  - ID consulted: starting Bactrim for ppx  - ID arranging HOP clinic follow up. I would prefer that the patient present to HOP clinic prior to restarting ART      Goals of care  Advance Care Planning     Date: 05/23/2024    Code Status  In light of the patients advanced and life limiting illness,I engaged the the patient in a voluntary conversation about the patient's preferences for care  at the very end of life. The patient wishes to have a natural, peaceful death.  Along those lines, the patient does not wish to have CPR or other invasive treatments performed when his heart and/or breathing stops. I communicated to the patient that a DNR order would be placed in his medical record to reflect this preference.  I spent a total of 10 minutes engaging the patient in this advance care planning discussion.           Gail  MD Ayaz  [facility] Medicine Staff  [facility]

## 2024-05-28 LAB — SARS-COV-2 RNA RESP QL NAA+PROBE: NOT DETECTED

## 2024-05-28 PROCEDURE — 25000003 PHARM REV CODE 250: Performed by: INTERNAL MEDICINE

## 2024-05-28 PROCEDURE — 27000221 HC OXYGEN, UP TO 24 HOURS

## 2024-05-28 PROCEDURE — 25000003 PHARM REV CODE 250: Performed by: HOSPITALIST

## 2024-05-28 PROCEDURE — 25000242 PHARM REV CODE 250 ALT 637 W/ HCPCS

## 2024-05-28 PROCEDURE — 94640 AIRWAY INHALATION TREATMENT: CPT

## 2024-05-28 PROCEDURE — 25000003 PHARM REV CODE 250

## 2024-05-28 PROCEDURE — 11000001 HC ACUTE MED/SURG PRIVATE ROOM

## 2024-05-28 PROCEDURE — 99900035 HC TECH TIME PER 15 MIN (STAT)

## 2024-05-28 PROCEDURE — 63600175 PHARM REV CODE 636 W HCPCS: Performed by: HOSPITALIST

## 2024-05-28 PROCEDURE — 94761 N-INVAS EAR/PLS OXIMETRY MLT: CPT

## 2024-05-28 PROCEDURE — 87635 SARS-COV-2 COVID-19 AMP PRB: CPT | Performed by: HOSPITALIST

## 2024-05-28 RX ORDER — TALC
6 POWDER (GRAM) TOPICAL NIGHTLY
Start: 2024-05-28

## 2024-05-28 RX ORDER — AMOXICILLIN 250 MG
1 CAPSULE ORAL 2 TIMES DAILY
Start: 2024-05-28

## 2024-05-28 RX ORDER — MECLIZINE HYDROCHLORIDE 25 MG/1
25 TABLET ORAL 3 TIMES DAILY PRN
Start: 2024-05-28

## 2024-05-28 RX ORDER — LIDOCAINE 50 MG/G
2 PATCH TOPICAL DAILY
Start: 2024-05-28

## 2024-05-28 RX ORDER — POLYETHYLENE GLYCOL 3350 17 G/17G
17 POWDER, FOR SOLUTION ORAL 2 TIMES DAILY
Start: 2024-05-28

## 2024-05-28 RX ORDER — ACETAMINOPHEN 500 MG
1000 TABLET ORAL 3 TIMES DAILY
Start: 2024-05-28

## 2024-05-28 RX ORDER — OXYCODONE HYDROCHLORIDE 5 MG/1
5 TABLET ORAL EVERY 4 HOURS PRN
Qty: 28 TABLET | Refills: 0 | Status: SHIPPED | OUTPATIENT
Start: 2024-05-28

## 2024-05-28 RX ADMIN — DEXAMETHASONE 5 MG: 4 TABLET ORAL at 08:05

## 2024-05-28 RX ADMIN — MECLIZINE 25 MG: 12.5 TABLET ORAL at 08:05

## 2024-05-28 RX ADMIN — ACETAMINOPHEN 1000 MG: 500 TABLET ORAL at 08:05

## 2024-05-28 RX ADMIN — GUAIFENESIN 1200 MG: 600 TABLET, EXTENDED RELEASE ORAL at 08:05

## 2024-05-28 RX ADMIN — IPRATROPIUM BROMIDE AND ALBUTEROL SULFATE 3 ML: 2.5; .5 SOLUTION RESPIRATORY (INHALATION) at 05:05

## 2024-05-28 RX ADMIN — SENNOSIDES AND DOCUSATE SODIUM 1 TABLET: 50; 8.6 TABLET ORAL at 08:05

## 2024-05-28 RX ADMIN — OXYCODONE 5 MG: 5 TABLET ORAL at 05:05

## 2024-05-28 RX ADMIN — BENZONATATE 100 MG: 100 CAPSULE ORAL at 08:05

## 2024-05-28 RX ADMIN — POLYETHYLENE GLYCOL 3350 17 G: 17 POWDER, FOR SOLUTION ORAL at 08:05

## 2024-05-28 RX ADMIN — OXYCODONE 5 MG: 5 TABLET ORAL at 08:05

## 2024-05-28 RX ADMIN — BENZONATATE 100 MG: 100 CAPSULE ORAL at 09:05

## 2024-05-28 RX ADMIN — LIDOCAINE 3 PATCH: 700 PATCH TOPICAL at 11:05

## 2024-05-28 RX ADMIN — ACETAMINOPHEN 1000 MG: 500 TABLET ORAL at 04:05

## 2024-05-28 RX ADMIN — BENZONATATE 100 MG: 100 CAPSULE ORAL at 04:05

## 2024-05-28 RX ADMIN — IPRATROPIUM BROMIDE AND ALBUTEROL SULFATE 3 ML: 2.5; .5 SOLUTION RESPIRATORY (INHALATION) at 06:05

## 2024-05-28 NOTE — PROGRESS NOTES
"[facility] 11H  Adult Nutrition  Progress Note    SUMMARY       Recommendations    1.) If and when medically able, recommend to ADAT, with goal of regular diet.      2.) If PO intake <50%, recommend Boost Plus (or alternate) TID to help meet needs.      3.) RD to monitor wt, PO intake, skin, labs.     Goals: to meet % of EEN/EPN by next RD f/u  Nutrition Goal Status: goal met  Communication of RD Recs:  (POC)    Assessment and Plan    Nutrition Problem  Increased PRO needs     Related to (etiology):   Increased physiological needs     Signs and Symptoms (as evidenced by):   Lung Ca      Interventions/Recommendations (treatment strategy):  Collaboration of nutritional care with other providers.      Nutrition Diagnosis Status:   Continues    Reason for Assessment    Reason For Assessment: RD follow-up  Diagnosis: cancer diagnosis/related complications (Adenocarcinoma, lung)  Relevant Medical History: AIDS, HTN, HLD  Interdisciplinary Rounds: did not attend    General Information Comments: RD f/u: pt denies n/v/d/c. Pt endorses good appetite, consuming % of the meals provided, depending on the meal. RD observed snacks at bedside. Pt continues to appear nourished, with no visible s/s of malnutrition. RD to continue to monitor.     Nutrition Discharge Planning: adequate PO intake and ONS of choice    Nutrition Risk Screen    Nutrition Risk Screen: no indicators present    Nutrition/Diet History    Patient Reported Diet/Restrictions/Preferences: general  Typical Food/Fluid Intake: 2-4 meals/day  Spiritual, Cultural Beliefs, Uatsdin Practices, Values that Affect Care: no  Food Allergies: shellfish    Anthropometrics    Temp: 98.6 °F (37 °C)  Height: 5' 5" (165.1 cm)  Height (inches): 65 in  Weight Method: Bed Scale  Weight: 70 kg (154 lb 5.2 oz)  Weight (lb): 154.32 lb  Ideal Body Weight (IBW), Male: 136 lb  % Ideal Body Weight, Male (lb): 111.85 %  BMI (Calculated): " 25.7    Lab/Procedures/Meds    Pertinent Labs Reviewed: reviewed  Pertinent Labs Comments: No new metabolic labs    Pertinent Medications Reviewed: reviewed  Pertinent Medications Comments: Dexamethasone, polyethylene glycol, abx, senna-docusate    Estimated/Assessed Needs    Weight Used For Calorie Calculations: 66.1 kg (145 lb 11.6 oz)  Energy Calorie Requirements (kcal): 1983 kcal  Energy Need Method: Kcal/kg (30 kcal/kg)    Protein Requirements: 79- 99g (1.2-1.5g/kg)  Weight Used For Protein Calculations: 66.1 kg (145 lb 11.6 oz)    Fluid Requirements (mL): 1ml/1kcal or per MD  Estimated Fluid Requirement Method: RDA Method  RDA Method (mL): 1983    Nutrition Prescription Ordered    Current Diet Order: Regular Diet    Evaluation of Received Nutrient/Fluid Intake    I/O: -2.7L since 5/22  Energy Calories Required: meeting needs  Protein Required: meeting needs  Fluid Required:  (as per MD)  Comments: LBM 5/26  Tolerance: tolerating  % Intake of Estimated Energy Needs: 75 - 100 %  % Meal Intake: 75 - 100 %    Nutrition Risk    Level of Risk/Frequency of Follow-up:  (RD to f/u x 1/week)     Monitor and Evaluation    Food and Nutrient Intake: energy intake, food and beverage intake  Food and Nutrient Adminstration: diet order  Anthropometric Measurements: weight, weight change, body mass index  Biochemical Data, Medical Tests and Procedures: electrolyte and renal panel, gastrointestinal profile, glucose/endocrine profile, inflammatory profile, lipid profile  Nutrition-Focused Physical Findings: overall appearance, skin     Nutrition Follow-Up    RD Follow-up?: Yes

## 2024-05-28 NOTE — PLAN OF CARE
Problem: Adult Inpatient Plan of Care  Goal: Plan of Care Review  Outcome: Progressing     Problem: Adult Inpatient Plan of Care  Goal: Patient-Specific Goal (Individualized)  Outcome: Progressing     Problem: Adult Inpatient Plan of Care  Goal: Absence of Hospital-Acquired Illness or Injury  Outcome: Progressing     Problem: COPD (Chronic Obstructive Pulmonary Disease)  Goal: Optimal Chronic Illness Coping  Outcome: Progressing  Goal: Optimal Level of Functional [place]  Outcome: Progressing  Goal: Absence of Infection Signs and Symptoms  Outcome: Progressing  Goal: Improved Oral Intake  Outcome: Progressing  Goal: Effective Oxygenation and Ventilation  Outcome: Progressing

## 2024-05-28 NOTE — PROGRESS NOTES
[facility] Hospital Medicine  Progress Note    Primary Team: [facility] HOSP MED O  Admit Date: 5/8/2024    Subjective:      HPI:  Oscar Waldron is a 64 y.o. male with a PMHx of cocaine abuse, HIV not on HAART, tobacco abuse, HTN who present to [facility] for evaluation of shortness of breath. Patient reports progressive worsening of shortness of breath over the past few weeks. He now has a  productive cough with associated left sided chest/ left upper abdomen pain which only occurs during coughing fits. Shortness of breath worsens while lying flat and with minimal exertion. No improvement with albuterol inhaler that he borrowed from a family member. Admits to smoking crack cocaine 2 days ago. Has >40 year hx of tobacco abuse. He stopped taking his HIV medications a few years ago.       ED: tachycardic to , improved without intervention. Satting 96% on 3L NC. BNP <10, trop 0.010. EKG with non-specifically TWAs. CXR with cardiomegaly with pulmonary vascular congestion and bilateral interstitial opacities, moderate left-sided pleural effusion with associated compressive atelectasis. Given solumedrol 125mg, azithro and rocephin.      Overview/Hospital Course:  Oscar Waldron is placed in  Observation for management of acute respiratory failure with hypoxia. Requiring supplemental oxygen on admit. CT chest reviewed, concern for malignant/metastatic process vs infectious process. Started on IV rocephin and azithromycin. Pulmonology and ID consulted. S/p thoracentesis for L pleural effusion 5/9 with 950 mL removed & showing exudative process. Infectious workup negative. Pleural fluid cytology unfortunately showing adenocarcinoma. Oncology consulted and have set an oncology appt on 5/22. ID is also setting him up in HOP clinic where he will need to present to start ART. Palliative Care following Repeat therapeutic thora done 5/15 with 1 L removed.  Repeat thora 5/17, 5/19. PleurX placed 5/20. Attempted NH  placement w/ hospice but facilities unable to meet needs. Planning for home hospice.     Interval History:  No acute events or new complaints. Had > 900 mL drained at bedside yesterday. Awaiting for NH with hospice placement; anticipate by tomorrow.     ROS:  As per HPI  General: no fever, no chills  Cardiovascular:  no orthopnea  Respiratory: + cough, shortness of breath, chest pain  All other systems reviewed & are negative.     No past medical history on file.  No past surgical history on file.      Objective:   Last 24 Hour Vital Signs:  BP  Min: 129/74  Max: 177/85  Temp  Av.6 °F (36.4 °C)  Min: 96 °F (35.6 °C)  Max: 98.1 °F (36.7 °C)  Pulse  Av.6  Min: 81  Max: 111  Resp  Av.9  Min: 16  Max: 22  SpO2  Av.5 %  Min: 92 %  Max: 99 %  Weight  Av kg (154 lb 5.2 oz)  Min: 70 kg (154 lb 5.2 oz)  Max: 70 kg (154 lb 5.2 oz)  I/O last 3 completed shifts:  In: 1160 [P.O.:1160]  Out: 1750 [Urine:1750]    Physical Examination:  GEN: AAOx3, NAD  HEENT: NCAT, MMM, PERRL, EOMI, oropharynx clear  CV: RRR, no m/r, no S3/S4  RESP: CTAB, no wheezes/crackles, no increased WOB, on 3L NC, PleurX catheter in place  ABD: soft, NTND, normoactive BS, no organomegaly  EXTR: no c/c/e, intact distal pulses x 4  NEURO: PERRL, EOMI, moving all four extremities, intact sensation to light touch, no focal deficits  SKIN: no rashes, lesions, or color changes  PSYCH: normal affect    Laboratory:  I have reviewed all pertinent lab results/findings within the past 24 hours.    Radiology:  I have reviewed all pertinent imaging results/findings within the past 24 hours.    Current Medications:     Infusions:       Scheduled:   acetaminophen  1,000 mg Oral TID    benzonatate  100 mg Oral TID    dexAMETHasone  5 mg Oral Daily    guaiFENesin  1,200 mg Oral BID    LIDOcaine  3 patch Transdermal Q24H    meclizine  25 mg Oral BID    polyethylene glycol  17 g Oral Daily    senna-docusate 8.6-50 mg  1 tablet Oral Daily     sulfamethoxazole-trimethoprim 800-160mg  1 tablet Oral Every Mon, Wed, Fri        PRN:    Current Facility-Administered Medications:     albuterol-ipratropium, 3 mL, Nebulization, Q6H PRN    bisacodyL, 10 mg, Rectal, Daily PRN    dextrose 10%, 12.5 g, Intravenous, PRN    dextrose 10%, 25 g, Intravenous, PRN    glucagon (human recombinant), 1 mg, Intramuscular, PRN    glucose, 16 g, Oral, PRN    glucose, 24 g, Oral, PRN    hydrOXYzine HCL, 10 mg, Oral, TID PRN    iohexol, 15 mL, Oral, PRN    melatonin, 6 mg, Oral, Nightly PRN    ondansetron, 8 mg, Oral, Q8H PRN    oxyCODONE, 5 mg, Oral, Q4H PRN    polyethylene glycol, 17 g, Oral, Daily PRN    promethazine, 25 mg, Oral, Q6H PRN    Prior records reviewed.       Assessment/Plan:     Oscar Waldrno is a 64 y.o.male with    Acute respiratory failure with hypoxia  Abnormal CT of lung  Metastatic lung adenocarcinoma  Left pleural effusion  - CT chest reviewed:  1. Solid spiculated nodule in the left lung apex and irregular consolidation area in the lingula, worrisome for lung cancer.   2. Ground-glass nodule in the right middle lobe, may represent infectious/inflammatory process versus primary lung cancer.  Consider attention on follow-up studies.   3. Bilateral irregular pulmonary nodules, may be sequela of infectious/inflammatory process versus neoplastic nodules.  Consider attention on follow-up.   4. Ill-defined hypoattenuation of the inferior hepatic lobe and few lucent lesions in the vertebral body of L1, nonspecific but worrisome for metastatic process.   5. Left adrenal nodule.     - CT abdomen reviewed:   Few heterogeneously hypodense liver lesions suspicious for metastasis.   Indeterminate indistinct lucent lesions and L1 vertebral body and right intertrochanteric region.  Metastasis not excluded.  Further evaluation could be obtained with contrast enhanced MRI.     - received rocephin/azithro/prednisone; prn duonebs  - acapella and CPT ordered  - pulm & ID  consulted  - thora 5/9 with 950 ml removed; pleural fluid studies suggestive of exudative process. Repeat thora with 1 L removed; repeat CXR.  - sputum cx - normal respiratory av  - mTB PCR negative  - PJP PCP negative  - aspergillus antigen negative  - fungitell negative  - crypto antigen negative  - AFB smear negative x 3. Follow AFB cultures  - pleural fluid cultures negative  - pleural fluid cytology shows lung adenocarcinoma       - imaging suggestive of metastatic disease  - oncology consulted -  OP f/u on 5/22  - palliative care consulted - prn percocet ordered  - qualifies for home oxygen per 6 min walk test performed on 5/14; home oxygen order placed  - persistently hypoxic 5/16 - 78% on RA; repeat CXR - worsened  - Pulmonary revisit 5/17, 5/19 for thoracentesis  - Pleurx 5/20   - DNR, election to comfort care plan with hospice as of 5/20.  SW assisting.    - recommend draining up to 1000 mL at least 3x/weekly as totlerated  - continue 2-3 L continuous O2 as needed    Hypophosphatemia  - phos 1.5, replaced  - daily phos     High cholesterol  - not on statin  - lipid panel reviewed     HTN (hypertension)  - not on home meds  - controlled currently     Human immunodeficiency virus (HIV) disease  AIDS  - non-adherent to ART; off since 2020  - CD4 115  - ID consulted: starting Bactrim for ppx  - ID arranging HOP clinic follow up. I would prefer that the patient present to HOP clinic prior to restarting ART      Goals of care  Advance Care Planning     Date: 05/23/2024    Code Status  In light of the patients advanced and life limiting illness,I engaged the the patient in a voluntary conversation about the patient's preferences for care  at the very end of life. The patient wishes to have a natural, peaceful death.  Along those lines, the patient does not wish to have CPR or other invasive treatments performed when his heart and/or breathing stops. I communicated to the patient that a DNR order would be  placed in his medical record to reflect this preference.  I spent a total of 10 minutes engaging the patient in this advance care planning discussion.           Gail Jacome MD  [facility] Medicine Staff  [facility]

## 2024-05-28 NOTE — PLAN OF CARE
Problem: Adult Inpatient Plan of Care  Goal: Plan of Care Review  Outcome: Progressing  Goal: Patient-Specific Goal (Individualized)  Outcome: Progressing  Goal: Absence of Hospital-Acquired Illness or Injury  Outcome: Progressing  Goal: Optimal Comfort and Wellbeing  Outcome: Progressing  Goal: Readiness for Transition of Care  Outcome: Progressing     Problem: COPD (Chronic Obstructive Pulmonary Disease)  Goal: Optimal Chronic Illness Coping  Outcome: Progressing  Goal: Optimal Level of Functional [place]  Outcome: Progressing  Goal: Absence of Infection Signs and Symptoms  Outcome: Progressing  Goal: Improved Oral Intake  Outcome: Progressing  Goal: Effective Oxygenation and Ventilation  Outcome: Progressing     Problem: Infection  Goal: Absence of Infection Signs and Symptoms  Outcome: Progressing     Problem: Coping Ineffective  Goal: Effective Coping  Outcome: Progressing     Problem: HIV/AIDS Infection  Goal: HIV/AIDS Symptom Control  Outcome: Progressing     Problem: Gas Exchange Impaired  Goal: Optimal Gas Exchange  Outcome: Progressing     Problem: Comorbidity Management  Goal: Blood Pressure in Desired Range  Outcome: Progressing     Problem: Electrolyte Imbalance  Goal: Electrolyte Balance  Outcome: Progressing     Problem: Oncology Care  Goal: Effective Coping  Outcome: Progressing  Goal: Improved Activity Tolerance  Outcome: Progressing  Goal: Optimal Oral Intake  Outcome: Progressing  Goal: Improved Oral Mucous Membrane Integrity  Outcome: Progressing  Goal: Optimal Pain Control and Function  Outcome: Progressing     Problem: Fall Injury Risk  Goal: Absence of Fall and Fall-Related Injury  Outcome: Progressing     Problem: Skin Injury Risk Increased  Goal: Skin Health and Integrity  Outcome: Progressing

## 2024-05-28 NOTE — PLAN OF CARE
Recommendations     1.) If and when medically able, recommend to ADAT, with goal of regular diet.      2.) If PO intake <50%, recommend Boost Plus (or alternate) TID to help meet needs.      3.) RD to monitor wt, PO intake, skin, labs.      Goals: to meet % of EEN/EPN by next RD f/u  Nutrition Goal Status: goal met  Communication of RD Recs:  (POC)

## 2024-05-28 NOTE — PLAN OF CARE
SINDI followed up with [facility] regarding pt's admission. Per [facility], his admissions coordinator will do paperwork today and can hopefully move patient tomorrow to the [facility]. SINDI will continue to f/u.       9:55 AM SINDI received a call from Nicole admissions coordinator with the [facility] who stated that she is in route to complete admission forms at the bedside with the pt.     12:03 PM SINDI faxed orders and med list to [facility], pending COVID.     1:59 PM COVID results sent to [facility].     Holly Moser, MSYAMILETH  [facility]  Ext. [phone]

## 2024-05-29 VITALS
SYSTOLIC BLOOD PRESSURE: 106 MMHG | OXYGEN SATURATION: 96 % | WEIGHT: 154.31 LBS | HEIGHT: 65 IN | TEMPERATURE: 98 F | DIASTOLIC BLOOD PRESSURE: 61 MMHG | HEART RATE: 95 BPM | RESPIRATION RATE: 20 BRPM | BODY MASS INDEX: 25.71 KG/M2

## 2024-05-29 PROCEDURE — 94761 N-INVAS EAR/PLS OXIMETRY MLT: CPT

## 2024-05-29 PROCEDURE — 25000003 PHARM REV CODE 250: Performed by: INTERNAL MEDICINE

## 2024-05-29 PROCEDURE — 25000003 PHARM REV CODE 250: Performed by: HOSPITALIST

## 2024-05-29 PROCEDURE — 25000242 PHARM REV CODE 250 ALT 637 W/ HCPCS

## 2024-05-29 PROCEDURE — 99900035 HC TECH TIME PER 15 MIN (STAT)

## 2024-05-29 PROCEDURE — 27000221 HC OXYGEN, UP TO 24 HOURS

## 2024-05-29 PROCEDURE — 25000003 PHARM REV CODE 250

## 2024-05-29 PROCEDURE — 63600175 PHARM REV CODE 636 W HCPCS: Performed by: HOSPITALIST

## 2024-05-29 PROCEDURE — 94640 AIRWAY INHALATION TREATMENT: CPT

## 2024-05-29 RX ADMIN — ACETAMINOPHEN 1000 MG: 500 TABLET ORAL at 02:05

## 2024-05-29 RX ADMIN — ACETAMINOPHEN 1000 MG: 500 TABLET ORAL at 08:05

## 2024-05-29 RX ADMIN — IPRATROPIUM BROMIDE AND ALBUTEROL SULFATE 3 ML: 2.5; .5 SOLUTION RESPIRATORY (INHALATION) at 04:05

## 2024-05-29 RX ADMIN — DEXAMETHASONE 5 MG: 4 TABLET ORAL at 08:05

## 2024-05-29 RX ADMIN — BENZONATATE 100 MG: 100 CAPSULE ORAL at 08:05

## 2024-05-29 RX ADMIN — SENNOSIDES AND DOCUSATE SODIUM 1 TABLET: 50; 8.6 TABLET ORAL at 08:05

## 2024-05-29 RX ADMIN — MECLIZINE 25 MG: 12.5 TABLET ORAL at 08:05

## 2024-05-29 RX ADMIN — IPRATROPIUM BROMIDE AND ALBUTEROL SULFATE 3 ML: 2.5; .5 SOLUTION RESPIRATORY (INHALATION) at 02:05

## 2024-05-29 RX ADMIN — BENZONATATE 100 MG: 100 CAPSULE ORAL at 02:05

## 2024-05-29 RX ADMIN — GUAIFENESIN 1200 MG: 600 TABLET, EXTENDED RELEASE ORAL at 08:05

## 2024-05-29 RX ADMIN — POLYETHYLENE GLYCOL 3350 17 G: 17 POWDER, FOR SOLUTION ORAL at 08:05

## 2024-05-29 NOTE — PLAN OF CARE
SW received notification from [facility] stating that Hospice will order the supplies for and manage the Pluerix Drain. SW will continue to follow up for final report.     9:21 AM SW spoke to Twila Curry with [facility] who confirmed that they will be caring for the patient once he gets to the NH. SW will continue to f/u as needed.     11:47 AM SW received report info and faxed over completed hospice form. Awaiting patient readiness from unit RN.     Holly Moser, MSW  [facility]  Ext. [phone]

## 2024-05-29 NOTE — PLAN OF CARE
05/29/24 1207   Final Note   Assessment Type Final Discharge Note   Anticipated Discharge Disposition [facility]   Hospital Resources/Appts/Education Provided Provided patient/caregiver with written discharge plan information   Post-Acute Status   Post-Acute Authorization Placement   Post-Acute Placement Status Set-up Complete/Auth obtained   Hospice Status Set-up Complete/Auth obtained   Patient choice form signed by patient/caregiver List with quality metrics by geographic area provided   Discharge Delays None known at this time     [facility]  Discharge Final Note    Primary Care Provider: Kenna, Primary Doctor    Expected Discharge Date: 5/29/2024    Patient is DC to [facility] with Hosp[facility] care from [facility].   Report Info: Please call report to [phone] Room 216-D. Unit RN notified.     Discharge Plan A and Plan B have been determined by review of patient's clinical status, future medical and therapeutic needs, and coverage/benefits for post-acute care in coordination with multidisciplinary team members.    SW arranged ambulance transport via [facility]. Requested  time is 1:00 PM.  Requested  time does not guarantee arrival time.  If transport does not arrive by 6:00 pm please contact assigned SW or on-call for assistance.     Patient's bedside nurse and patient notified of the above.      Patient cleared for discharge from case management standpoint.     Final Discharge Note (most recent)       Final Note - 05/29/24 1207          Final Note    Assessment Type Final Discharge Note (P)      Anticipated Discharge Disposition Hospice/Medical Facility (P)      Hospital Resources/Appts/Education Provided Provided patient/caregiver with written discharge plan information (P)         Post-Acute Status    Post-Acute Authorization Placement (P)      Post-Acute Placement Status Set-up Complete/Auth obtained (P)      Hospice Status Set-up Complete/Auth  obtained (P)      Patient choice form signed by patient/caregiver List with quality metrics by geographic area provided (P)      Discharge Delays None known at this time (P)                      Important Message from Medicare             Contact Info       [facility] - Dr Rosa        Next Steps: Schedule an appointment as soon as possible for a visit            No future appointments.    SW scheduled post-discharge follow-up appointment and information added to AVS.     Holly Moser, MSW  [facility]  Ext. [phone]

## 2024-05-29 NOTE — PLAN OF CARE
Problem: Adult Inpatient Plan of Care  Goal: Plan of Care Review  5/29/2024 1557 by Nigel Roberts RN  Outcome: Met  5/29/2024 1555 by Nigel Roberts RN  Outcome: Progressing  5/29/2024 1554 by Nigel Roberts RN  Outcome: Not Progressing  Goal: Patient-Specific Goal (Individualized)  5/29/2024 1557 by Nigel Roberts RN  Outcome: Met  5/29/2024 1555 by Nigel Roberts RN  Outcome: Progressing  5/29/2024 1554 by Nigel Roberts RN  Outcome: Not Progressing  Goal: Absence of Hospital-Acquired Illness or Injury  5/29/2024 1557 by Nigel Roberts RN  Outcome: Met  5/29/2024 1555 by Nigel Roberts RN  Outcome: Progressing  5/29/2024 1554 by Nigel Roberts RN  Outcome: Not Progressing  Goal: Optimal Comfort and Wellbeing  5/29/2024 1557 by Nigel Roberts RN  Outcome: Met  5/29/2024 1555 by Nigel Roberts RN  Outcome: Progressing  5/29/2024 1554 by Nigel Roberts RN  Outcome: Not Progressing  Goal: Readiness for Transition of Care  5/29/2024 1557 by Nigel Roberts RN  Outcome: Met  5/29/2024 1555 by Nigel Roberts RN  Outcome: Progressing  5/29/2024 1554 by Nigel Roberts RN  Outcome: Not Progressing     Problem: COPD (Chronic Obstructive Pulmonary Disease)  Goal: Optimal Chronic Illness Coping  5/29/2024 1557 by Nigel Roberts RN  Outcome: Met  5/29/2024 1555 by Nigel Roberts RN  Outcome: Progressing  5/29/2024 1554 by Nigel Roberts RN  Outcome: Not Progressing  Goal: Optimal Level of Functional [place]  5/29/2024 1557 by Nigel Roberts RN  Outcome: Met  5/29/2024 1555 by Nigel Roberts RN  Outcome: Progressing  5/29/2024 1554 by Nigel Roberts RN  Outcome: Not Progressing  Goal: Absence of Infection Signs and Symptoms  5/29/2024 1557 by Nigel Roberts RN  Outcome: Met  5/29/2024 1555 by Nigel Roberts, RN  Outcome: Progressing  5/29/2024 1554 by Nigel Roberts, RN  Outcome: Not Progressing  Goal: Improved Oral Intake  5/29/2024 1557 by Armando,  IRMA Manning  Outcome: Met  5/29/2024 1555 by Nigel Roberts RN  Outcome: Progressing  5/29/2024 1554 by Nigel Roberts RN  Outcome: Not Progressing  Goal: Effective Oxygenation and Ventilation  5/29/2024 1557 by Nigel Roberts, RN  Outcome: Met  5/29/2024 1555 by Nigel Roberts RN  Outcome: Progressing  5/29/2024 1554 by Nigel Roberts RN  Outcome: Not Progressing     Problem: COPD (Chronic Obstructive Pulmonary Disease)  Goal: Optimal Level of Functional [place]  5/29/2024 1557 by Nigel Roberts RN  Outcome: Met  5/29/2024 1555 by Nigel Roberts RN  Outcome: Progressing  5/29/2024 1554 by Nigel Roberts RN  Outcome: Not Progressing     Problem: COPD (Chronic Obstructive Pulmonary Disease)  Goal: Absence of Infection Signs and Symptoms  5/29/2024 1557 by Nigel Roberts RN  Outcome: Met  5/29/2024 1555 by Nigel Roberts RN  Outcome: Progressing  5/29/2024 1554 by Nigel Roberts RN  Outcome: Not Progressing     Problem: COPD (Chronic Obstructive Pulmonary Disease)  Goal: Improved Oral Intake  5/29/2024 1557 by Nigel Roberts RN  Outcome: Met  5/29/2024 1555 by Nigel Roberts RN  Outcome: Progressing  5/29/2024 1554 by Nigel Roberts RN  Outcome: Not Progressing     Problem: COPD (Chronic Obstructive Pulmonary Disease)  Goal: Effective Oxygenation and Ventilation  5/29/2024 1557 by Nigel Roberts RN  Outcome: Met     Problem: Infection  Goal: Absence of Infection Signs and Symptoms  5/29/2024 1557 by Nigel Roberts RN  Outcome: Met  5/29/2024 1555 by Nigel Roberts RN  Outcome: Progressing  5/29/2024 1554 by Nigel Roberts RN  Outcome: Not Progressing     Problem: Coping Ineffective  Goal: Effective Coping  5/29/2024 1557 by Nigel Roberts, RN  Outcome: Met  5/29/2024 1555 by Nigel Roberts, RN  Outcome: Progressing  5/29/2024 1554 by Nigel Roberts RN  Outcome: Not Progressing     Problem: HIV/AIDS Infection  Goal: HIV/AIDS Symptom Control  5/29/2024 4134  by Nigel Roberts RN  Outcome: Met  5/29/2024 1555 by Nigel Roberts, RN  Outcome: Progressing  5/29/2024 1554 by Nigel Roberts, RN  Outcome: Not Progressing     Problem: Gas Exchange Impaired  Goal: Optimal Gas Exchange  5/29/2024 1557 by Nigel Roberts, RN  Outcome: Met  5/29/2024 1555 by Nigel Roberts, RN  Outcome: Progressing  5/29/2024 1554 by Nigel Roberts, RN  Outcome: Not Progressing     Problem: Comorbidity Management  Goal: Blood Pressure in Desired Range  5/29/2024 1557 by Nigel Roberts, RN  Outcome: Met  5/29/2024 1555 by Nigel Roberts, RN  Outcome: Progressing  5/29/2024 1554 by Nigel Roberts RN  Outcome: Not Progressing     Problem: Oncology Care  Goal: Effective Coping  5/29/2024 1557 by Nigel Roberts, RN  Outcome: Met  5/29/2024 1555 by Nigel Roberts, RN  Outcome: Progressing  5/29/2024 1554 by Nigel Roberts, RN  Outcome: Not Progressing     Problem: Oncology Care  Goal: Optimal Oral Intake  5/29/2024 1557 by Nigel Roberts, RN  Outcome: Met  5/29/2024 1555 by Nigel Roberts, RN  Outcome: Progressing  5/29/2024 1554 by Nigel Roberts, RN  Outcome: Not Progressing     Problem: Oncology Care  Goal: Improved Activity Tolerance  5/29/2024 1557 by Nigel Roberts, RN  Outcome: Met  5/29/2024 1555 by Nigel Roberts, RN  Outcome: Progressing  5/29/2024 1554 by Nigel Roberts, RN  Outcome: Not Progressing     Problem: Oncology Care  Goal: Improved Oral Mucous Membrane Integrity  5/29/2024 1557 by Nigel Roberts, RN  Outcome: Met  5/29/2024 1555 by Nigel Roberts, RN  Outcome: Progressing  5/29/2024 1554 by Nigel Roberts, RN  Outcome: Not Progressing     Problem: Oncology Care  Goal: Optimal Pain Control and Function  5/29/2024 1557 by Nigel Roberts, RN  Outcome: Met  5/29/2024 1555 by Nigel Roberts RN  Outcome: Progressing  5/29/2024 1554 by Nigel Roberts RN  Outcome: Not Progressing     Problem: Fall Injury Risk  Goal: Absence of Fall and  Fall-Related Injury  5/29/2024 1557 by Nigel Roberts, RN  Outcome: Met  5/29/2024 1555 by Nigel Roberts RN  Outcome: Progressing  5/29/2024 1554 by Nigel Roberts, RN  Outcome: Not Progressing

## 2024-05-29 NOTE — NURSING
Drained per pleurx total of 650 cc reddish tinged fluid , allowed patient to participate in care . Placed a sterile dressing to left chest wall , patient reported relief , informed report given to nurse at [facility] and additional bottles will be sent.   Dr Jacome informed of the amount drained .   Patient notified son of transfer

## 2024-05-29 NOTE — PLAN OF CARE
Problem: Adult Inpatient Plan of Care  Goal: Plan of Care Review  5/29/2024 1555 by Nigel oRberts RN  Outcome: Progressing  5/29/2024 1554 by Nigel Roberts RN  Outcome: Not Progressing  Goal: Patient-Specific Goal (Individualized)  5/29/2024 1555 by Nigel Roberts RN  Outcome: Progressing  5/29/2024 1554 by Nigel Roberts RN  Outcome: Not Progressing  Goal: Absence of Hospital-Acquired Illness or Injury  5/29/2024 1555 by Nigel Roberts RN  Outcome: Progressing  5/29/2024 1554 by Nigel Roberts RN  Outcome: Not Progressing  Goal: Optimal Comfort and Wellbeing  5/29/2024 1555 by Nigel Roberts RN  Outcome: Progressing  5/29/2024 1554 by Nigel Roberts RN  Outcome: Not Progressing  Goal: Readiness for Transition of Care  5/29/2024 1555 by Nigel Roberts RN  Outcome: Progressing  5/29/2024 1554 by Nigel Roberts RN  Outcome: Not Progressing     Problem: COPD (Chronic Obstructive Pulmonary Disease)  Goal: Optimal Chronic Illness Coping  5/29/2024 1555 by Nigel Roberts RN  Outcome: Progressing  5/29/2024 1554 by Nigel Roberts RN  Outcome: Not Progressing  Goal: Optimal Level of Functional [place]  5/29/2024 1555 by Nigel Roberts RN  Outcome: Progressing  5/29/2024 1554 by Nigel Roberts RN  Outcome: Not Progressing  Goal: Absence of Infection Signs and Symptoms  5/29/2024 1555 by Nigel Roberts RN  Outcome: Progressing  5/29/2024 1554 by Nigel Roberts RN  Outcome: Not Progressing  Goal: Improved Oral Intake  5/29/2024 1555 by Nigel Roberts RN  Outcome: Progressing  5/29/2024 1554 by Nigel Roberts RN  Outcome: Not Progressing  Goal: Effective Oxygenation and Ventilation  5/29/2024 1555 by Nigel Roberts RN  Outcome: Progressing  5/29/2024 1554 by Nigel Roberts RN  Outcome: Not Progressing

## 2024-05-29 NOTE — PLAN OF CARE
Problem: Adult Inpatient Plan of Care  Goal: Plan of Care Review  Outcome: Progressing  Goal: Patient-Specific Goal (Individualized)  Outcome: Progressing  Goal: Absence of Hospital-Acquired Illness or Injury  Outcome: Progressing  Goal: Optimal Comfort and Wellbeing  Outcome: Progressing  Goal: Readiness for Transition of Care  Outcome: Progressing     Problem: COPD (Chronic Obstructive Pulmonary Disease)  Goal: Optimal Chronic Illness Coping  Outcome: Progressing  Goal: Optimal Level of Functional [place]  Outcome: Progressing  Goal: Absence of Infection Signs and Symptoms  Outcome: Progressing  Goal: Improved Oral Intake  Outcome: Progressing  Goal: Effective Oxygenation and Ventilation  Outcome: Progressing     Problem: Infection  Goal: Absence of Infection Signs and Symptoms  Outcome: Progressing     Problem: Coping Ineffective  Goal: Effective Coping  Outcome: Progressing     Problem: HIV/AIDS Infection  Goal: HIV/AIDS Symptom Control  Outcome: Progressing     Problem: Gas Exchange Impaired  Goal: Optimal Gas Exchange  Outcome: Progressing     Problem: Comorbidity Management  Goal: Blood Pressure in Desired Range  Outcome: Progressing     Problem: Electrolyte Imbalance  Goal: Electrolyte Balance  Outcome: Progressing     Problem: Oncology Care  Goal: Effective Coping  Outcome: Progressing  Goal: Improved Activity Tolerance  Outcome: Progressing  Goal: Optimal Oral Intake  Outcome: Progressing  Goal: Improved Oral Mucous Membrane Integrity  Outcome: Progressing  Goal: Optimal Pain Control and Function  Outcome: Progressing     Problem: Fall Injury Risk  Goal: Absence of Fall and Fall-Related Injury  Outcome: Progressing     Problem: Skin Injury Risk Increased  Goal: Skin Health and Integrity  Outcome: Progressing

## 2024-05-29 NOTE — DISCHARGE SUMMARY
[facility] [address] [place] [address] Medicine  Discharge Summary      Patient Name: Oscar Waldron  MRN: [id number]  FAISAL: [id number]  Patient Class: IP- Inpatient  Admission Date: 5/8/2024  Hospital Length of Stay: 20 days  Discharge Date and Time:  05/29/2024 1:25 PM  Attending Physician: Gail Jacome MD   Discharging Provider: Gail Jacome MD  Primary Care Provider: Kenna, Primary Doctor  [facility] Medicine Team: [facility] HOSP MED O Gail Jacome MD  Primary Care Team: [facility] HOSP MED O    HPI:   Oscar Waldron is a 64 y.o. male with a PMHx of cocaine abuse, HIV not on HAART, tobacco abuse, HTN who present to [facility] for evaluation of shortness of breath. Patient reports progressive worsening of shortness of breath over the past few weeks. He now has a  productive cough with associated left sided chest/ left upper abdomen pain which only occurs during coughing fits. Shortness of breath worsens while lying flat and with minimal exertion. No improvement with albuterol inhaler that he borrowed from a family member. Admits to smoking crack cocaine 2 days ago. Has >40 year hx of tobacco abuse. He stopped taking his HIV medications a few years ago.      ED: tachycardic to , improved without intervention. Satting 96% on 3L NC. BNP <10, trop 0.010. EKG with non-specifically TWAs. CXR with cardiomegaly with pulmonary vascular congestion and bilateral interstitial opacities, moderate left-sided pleural effusion with associated compressive atelectasis. Given solumedrol 125mg, azithro and rocephin.     * No surgery found *      Hospital Course:   Oscar Waldron is placed in  Observation for management of acute respiratory failure with hypoxia. Requiring supplemental oxygen on admit. CT chest reviewed, concern for malignant/metastatic process vs infectious process. Started on IV rocephin and azithromycin. Pulmonology and ID consulted. S/p thoracentesis for L pleural effusion 5/9 with 950 mL removed & showing exudative process. Infectious  workup negative. ID recommending Bactrim for ppx in setting of AIDS; patient may follow up with his outpatient HOP clinic if pt desires to resume ART. Pleural fluid cytology unfortunately showed adenocarcinoma. Palliative Care consulted. Patient opted for hospice care. Required additional thora's on 5/15, 5/17, 5/19. Recurrent malignant effusion causing hypoxia so PleurX placed 5/20. Discharged to NH with hospice. Plan for PleurX drainage at least twice weekly, up to 1 L drainage at a time. Patient received education on how to drain catheter, and will also receive assistance with supplies, etc via [employer].      Goals of Care Treatment Preferences:  Code Status: DNR      Consults:   Consults (From admission, onward)          Status Ordering Provider     [facility]  Once        Provider:  (Not yet assigned)    Completed CHRISTINE, ESLVIN     Inpatient consult to Hematology/Oncology  Once        Provider:  (Not yet assigned)    Completed CHRISTINE, SELVIN     Inpatient consult to Palliative Care  Once        Provider:  (Not yet assigned)    Completed CHRISTINE, SELVIN     Inpatient consult to Pulmonology  Once        Provider:  (Not yet assigned)    Completed ADALI HAMMER     Inpatient consult to Infectious Diseases  Once        Provider:  (Not yet assigned)    Completed ADALI HAMMER.            No new Assessment & Plan notes have been filed under this hospital service since the last note was generated.  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] 05/20/2024 Yes    Adenocarcinoma, lung [C34.90] 05/14/2024 Yes    Palliative care encounter [Z51.5] 05/14/2024 Not Applicable    Hypodense mass of liver [R16.0] 05/12/2024 Yes    Hypophosphatemia [E83.39] 05/09/2024 No    Acute respiratory failure with hypoxia [J96.01] 05/08/2024 Yes    Pleural effusion, left [J90] 05/08/2024 Yes    HTN  "(hypertension) [I10] 05/20/2015 Yes    High cholesterol [E78.00] 05/20/2015 Yes    AIDS (acquired immunodeficiency syndrome), CD4 <=200 [B20] 02/25/2014 Yes      Problems Resolved During this Admission:       Discharged Condition: stable    Disposition: Skilled Nursing Facility    Follow Up:   Follow-up Information       [facility] - Dr Rosa. Schedule an appointment as soon as possible for a visit.                           Patient Instructions:      OXYGEN FOR HOME USE     Order Specific Question Answer Comments   Liter Flow 2    Duration Continuous    Qualifying Test Performed at: Activity    Oxygen saturation at rest 92    Oxygen saturation with activity 82    Oxygen saturation with activity on oxygen 93    Portable mode: continuous    Route nasal cannula    Device: home concentrator with portable tanks    Length of need (in months): 99 mos    Patient condition with qualifying saturation Other - List qualifying diagnosis and code    Select a diagnosis & list the code in the comments Lung cancer [157965]    Height: 5' 5" (1.651 m)    Weight: 68.8 kg (151 lb 10.8 oz)    Alternative treatment measures have been tried or considered and deemed clinically ineffective. Yes      Ambulatory referral/consult to CLINIC Palliative Care   Standing Status: Future   Referral Priority: Routine Referral Type: Consultation   Requested Specialty: Palliative Medicine   Number of Visits Requested: 1     Diet Adult Regular     Notify your health care provider if you experience any of the following:  temperature >100.4     Notify your health care provider if you experience any of the following:  severe uncontrolled pain     Notify your health care provider if you experience any of the following:  difficulty breathing or increased cough     Notify your health care provider if you experience any of the following:  persistent dizziness, light-headedness, or visual disturbances     Notify your health care provider if you experience any " of the following:  increased confusion or weakness     Activity as tolerated       Significant Diagnostic Studies: N/A    Pending Diagnostic Studies:       Procedure Component Value Units Date/Time    Freeze and Hold, [facility] [[id number]] Collected: 05/09/24 1213    Order Status: Sent Lab Status: No result     Specimen: Body Fluid from Thoracentesis Fluid            Medications:  Reconciled Home Medications:      Medication List        START taking these medications      acetaminophen 500 MG tablet  Commonly known as: TYLENOL  Take 2 tablets (1,000 mg total) by mouth 3 (three) times daily.     albuterol-ipratropium 2.5 mg-0.5 mg/3 mL nebulizer solution  Commonly known as: DUO-NEB  Take 3 mLs (1 vial) by nebulization every 6 (six) hours as needed for Wheezing. Rescue     benzonatate 100 MG capsule  Commonly known as: TESSALON  Take 1 capsule (100 mg total) by mouth 3 (three) times daily as needed for Cough.     LIDOcaine 5 %  Commonly known as: LIDODERM  Place 2 patches onto the skin once daily. To chest wall. Remove & Discard patch within 12 hours or as directed by MD.     meclizine 25 mg tablet  Commonly known as: ANTIVERT  Take 1 tablet (25 mg total) by mouth 3 (three) times daily as needed for Dizziness.     melatonin 3 mg tablet  Commonly known as: MELATIN  Take 2 tablets (6 mg total) by mouth nightly.     oxyCODONE 5 MG immediate release tablet  Commonly known as: ROXICODONE  Take 1 tablet (5 mg total) by mouth every 4 (four) hours as needed (severe pain).     polyethylene glycol 17 gram Pwpk  Commonly known as: GLYCOLAX  Take 17 g by mouth 2 (two) times daily.     senna-docusate 8.6-50 mg 8.6-50 mg per tablet  Commonly known as: SENNA WITH DOCUSATE SODIUM  Take 1 tablet by mouth 2 (two) times a day.     sulfamethoxazole-trimethoprim 800-160mg 800-160 mg Tab  Commonly known as: BACTRIM DS  Take 1 tablet by mouth every Mon, Wed, Fri.              Indwelling Lines/Drains at time of discharge:   Lines/Drains/Airways        Drain  Duration                  Drain/Device  05/20/24 Left lateral chest other (see comments) 9 days                    Time spent on the discharge of patient: 35 minutes of time spent on discharge, including examining the patient, providing discharge instructions, arranging follow up, and documentation.            Gail Jacome MD  Department of Hospital Medicine  [facility]

## 2024-06-12 LAB
FUNGUS SPEC CULT: NORMAL
FUNGUS SPEC CULT: NORMAL